# Patient Record
Sex: MALE | Race: WHITE | NOT HISPANIC OR LATINO | Employment: OTHER | URBAN - METROPOLITAN AREA
[De-identification: names, ages, dates, MRNs, and addresses within clinical notes are randomized per-mention and may not be internally consistent; named-entity substitution may affect disease eponyms.]

---

## 2021-03-18 ENCOUNTER — TELEPHONE (OUTPATIENT)
Dept: ADMINISTRATIVE | Facility: OTHER | Age: 66
End: 2021-03-18

## 2021-03-18 ENCOUNTER — OFFICE VISIT (OUTPATIENT)
Dept: INTERNAL MEDICINE CLINIC | Facility: CLINIC | Age: 66
End: 2021-03-18
Payer: MEDICARE

## 2021-03-18 VITALS
HEART RATE: 71 BPM | SYSTOLIC BLOOD PRESSURE: 126 MMHG | DIASTOLIC BLOOD PRESSURE: 78 MMHG | OXYGEN SATURATION: 99 % | WEIGHT: 238.38 LBS | TEMPERATURE: 96.9 F | HEIGHT: 70 IN | BODY MASS INDEX: 34.13 KG/M2

## 2021-03-18 DIAGNOSIS — I35.8 AORTIC VALVE SCLEROSIS: ICD-10-CM

## 2021-03-18 DIAGNOSIS — G47.30 SLEEP APNEA IN ADULT: ICD-10-CM

## 2021-03-18 DIAGNOSIS — I10 BENIGN ESSENTIAL HYPERTENSION: Primary | ICD-10-CM

## 2021-03-18 DIAGNOSIS — Z23 IMMUNIZATION DUE: ICD-10-CM

## 2021-03-18 DIAGNOSIS — E11.9 TYPE 2 DIABETES MELLITUS WITHOUT COMPLICATION, WITHOUT LONG-TERM CURRENT USE OF INSULIN (HCC): ICD-10-CM

## 2021-03-18 PROBLEM — M15.9 PRIMARY OSTEOARTHRITIS INVOLVING MULTIPLE JOINTS: Status: ACTIVE | Noted: 2019-09-24

## 2021-03-18 PROBLEM — E66.812 CLASS 2 OBESITY DUE TO EXCESS CALORIES IN ADULT: Status: ACTIVE | Noted: 2020-10-19

## 2021-03-18 PROBLEM — E66.09 CLASS 2 OBESITY DUE TO EXCESS CALORIES IN ADULT: Status: ACTIVE | Noted: 2020-10-19

## 2021-03-18 PROBLEM — E78.2 MIXED HYPERLIPIDEMIA: Status: ACTIVE | Noted: 2020-10-19

## 2021-03-18 PROBLEM — M15.0 PRIMARY OSTEOARTHRITIS INVOLVING MULTIPLE JOINTS: Status: ACTIVE | Noted: 2019-09-24

## 2021-03-18 PROCEDURE — 90670 PCV13 VACCINE IM: CPT

## 2021-03-18 PROCEDURE — 99204 OFFICE O/P NEW MOD 45 MIN: CPT | Performed by: INTERNAL MEDICINE

## 2021-03-18 PROCEDURE — G0009 ADMIN PNEUMOCOCCAL VACCINE: HCPCS

## 2021-03-18 RX ORDER — ATORVASTATIN CALCIUM 80 MG/1
80 TABLET, FILM COATED ORAL
COMMUNITY
Start: 2021-02-19 | End: 2021-08-26 | Stop reason: SDUPTHER

## 2021-03-18 RX ORDER — ALLOPURINOL 300 MG/1
300 TABLET ORAL
COMMUNITY
Start: 2021-02-19 | End: 2021-08-26 | Stop reason: SDUPTHER

## 2021-03-18 RX ORDER — RABEPRAZOLE SODIUM 20 MG/1
20 TABLET, DELAYED RELEASE ORAL
COMMUNITY
End: 2021-03-18 | Stop reason: ALTCHOICE

## 2021-03-18 RX ORDER — ASPIRIN 325 MG
81 TABLET ORAL DAILY
COMMUNITY
End: 2021-08-26 | Stop reason: SDUPTHER

## 2021-03-18 RX ORDER — PANTOPRAZOLE SODIUM 40 MG/1
40 TABLET, DELAYED RELEASE ORAL
COMMUNITY
Start: 2021-02-19 | End: 2021-08-26 | Stop reason: SDUPTHER

## 2021-03-18 RX ORDER — LOSARTAN POTASSIUM 50 MG/1
50 TABLET ORAL
COMMUNITY
Start: 2021-02-19 | End: 2021-08-26 | Stop reason: SDUPTHER

## 2021-03-18 RX ORDER — EPINEPHRINE 0.3 MG/.3ML
INJECTION SUBCUTANEOUS
COMMUNITY
Start: 2021-02-19

## 2021-03-18 RX ORDER — METFORMIN HYDROCHLORIDE 500 MG/1
TABLET, EXTENDED RELEASE ORAL
COMMUNITY
Start: 2021-02-19 | End: 2021-08-26 | Stop reason: SDUPTHER

## 2021-03-18 RX ORDER — HYDROCHLOROTHIAZIDE 25 MG/1
50 TABLET ORAL
COMMUNITY
Start: 2021-02-19 | End: 2021-07-03 | Stop reason: SDUPTHER

## 2021-03-18 NOTE — PROGRESS NOTES
Assessment/Plan:         Diagnoses and all orders for this visit:    Benign essential hypertension  Bp stable on current regimen Pt has lost over 20 pounds since Fall and is continuing his efforts He will monitor for s/s of low bp as he continues to lose weight     Sleep apnea in adult  Pt stable on bipap for many years No issues and sleeps better with bipap    Type 2 diabetes mellitus without complication, without long-term current use of insulin ([address] )  He feels doing better with diet and has lost weight since placed on Metformin in Fall He will be due for A1c in April He does not monitor at home but has lost over 20 pounds     Aortic valve sclerosis  -     Echo complete with contrast if indicated; Future  Had echo almost 2 years ago and does have murmur today recheck echo ? Cardio followup    Other orders  -     allopurinol (ZYLOPRIM) 300 mg tablet; Take 300 mg by mouth  -     atorvastatin (LIPITOR) 80 mg tablet; Take 80 mg by mouth  -     EPINEPHrine (EPIPEN) 0 3 mg/0 3 mL SOAJ; Inject 0 3mg/0 3ml as needed for allergic rxn  -     hydrochlorothiazide (HYDRODIURIL) 25 mg tablet; Take 50 mg by mouth  -     losartan (COZAAR) 50 mg tablet; Take 50 mg by mouth  -     metFORMIN (GLUCOPHAGE-XR) 500 mg 24 hr tablet; 2 tabs daily with dinner  -     pantoprazole (PROTONIX) 40 mg tablet; Take 40 mg by mouth  -     Discontinue: RABEprazole (ACIPHEX) 20 MG tablet; Take 20 mg by mouth  -     aspirin 325 mg tablet; Take 325 mg by mouth daily  -     VITAMIN D PO; Take 2,000 Int'l Units by mouth daily    Rto 4 months        Patient ID: Akil Morales is a 72 y o  male  HPI  NP 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 [facility]   He generally feels well, better in fact His A1c was elevated in the fall and placed on metformin He moralez snot monitor sugars at home but he had changed his diet and lost over 20 pounds No chest pain or sob No falls He had colonoscopy last year His bowels are sluggish at times since change in diet He is not exercising regularly but will be more active as the weather improves Hx of aortic sclerosis last echo almost 2 years ago No syncope No dizziness No sob He had both covid vaccinations Has eye exam scheduled for next week       Review of Systems   Constitutional: Negative  HENT: Negative  Respiratory: Negative  Cardiovascular: Negative  Gastrointestinal: Negative  Genitourinary: Negative  Musculoskeletal: Positive for neck pain  Skin: Negative  Neurological: Negative  Hematological: Negative  Psychiatric/Behavioral: Negative          Past Medical History:   Diagnosis Date    Diabetes mellitus ([place] [address] )     DM (diabetes mellitus) ([place] [address] )     GERD (gastroesophageal reflux disease)     Gout     Hypertension     Osteoarthritis     Sleep apnea      Past Surgical History:   Procedure Laterality Date    HERNIA REPAIR      LAMINECTOMY       Social History     Socioeconomic History    Marital status: /Civil Union     Spouse name: Not on file    Number of children: Not on file    Years of education: Not on file    Highest education level: Not on file   Occupational History    Not on file   Social Needs    Financial resource strain: Not on file    Food insecurity     Worry: Not on file     Inability: Not on file    Transportation needs     Medical: Not on file     Non-medical: Not on file   Tobacco Use    Smoking status: Former Smoker    Smokeless tobacco: Never Used   Substance and Sexual Activity    Alcohol use: Yes     Frequency: 2-3 times a week     Drinks per session: 1 or 2     Binge frequency: Less than monthly    Drug use: Never    Sexual activity: Not on file   Lifestyle    Physical activity     Days per week: Not on file     Minutes per session: Not on file    Stress: Not on file   Relationships    Social connections     Talks on phone: Not on file     Gets together: Not on file     Attends [employer] service: Not on file     Active member of club or organization: Not on file     Attends meetings of clubs or organizations: Not on file     Relationship status: Not on file    Intimate partner violence     Fear of current or ex partner: Not on file     Emotionally abused: Not on file     Physically abused: Not on file     Forced sexual activity: Not on file   Other Topics Concern    Not on file   Social History Narrative    Not on file     Allergies   Allergen Reactions    Nuts Anaphylaxis     BMI Counseling: Body mass index is 34 2 kg/m²  The BMI is above normal  Nutrition recommendations include consuming healthier snacks and moderation in carbohydrate intake  Exercise recommendations include exercising 3-5 times per week  No pharmacotherapy was ordered  /78   Pulse 71   Temp (!) 96 9 °F (36 1 °C) (Temporal)   Ht 5' 10" (1 778 m)   Wt 108 kg (238 lb 6 oz)   SpO2 99%   BMI 34 20 kg/m²          Physical Exam  Vitals signs reviewed  Constitutional:       General: He is not in acute distress  Appearance: Normal appearance  He is not ill-appearing, toxic-appearing or diaphoretic  HENT:      Head: Normocephalic and atraumatic  Neurological:      Mental Status: He is alert

## 2021-03-18 NOTE — LETTER
Procedure Request Form: Colonoscopy  2nd Request    Date Requested: 21  Patient: Calvin Tan  Patient : 1955   Referring Provider: Helena Gallop, DO        Date of Procedure ______________________________       The above patient has informed us that they have completed their   most recent Colonoscopy at your facility  Please complete   this form and attach all corresponding procedure reports/results  Comments __________________________________________________________  ____________________________________________________________________  ____________________________________________________________________  ____________________________________________________________________    Facility Completing Procedure _________________________________________    Form Completed By (print name) _______________________________________      Signature __________________________________________________________      These reports are needed for  compliance    Please fax this completed form and a copy of the procedure report to our office located at [place] [address] as soon as possible to [phone] andres Abarca: Phone [phone]    We thank you for your assistance in treating our mutual patient

## 2021-03-18 NOTE — LETTER
Procedure Request Form: Colonoscopy      Date Requested: 21  Patient: Urvashi Shelling  Patient : 1955   Referring Provider: Macario Gent, DO        Date of Procedure ______________________________       The above patient has informed us that they have completed their   most recent Colonoscopy at your facility  Please complete   this form and attach all corresponding procedure reports/results  Comments __________________________________________________________  ____________________________________________________________________  ____________________________________________________________________  ____________________________________________________________________    Facility Completing Procedure _________________________________________    Form Completed By (print name) _______________________________________      Signature __________________________________________________________      These reports are needed for  compliance    Please fax this completed form and a copy of the procedure report to our office located at [place] [address] as soon as possible to [phone] andres Choi Both: Phone [phone]    We thank you for your assistance in treating our mutual patient

## 2021-03-18 NOTE — LETTER
Procedure Request Form: Colonoscopy      Date Requested: 21  Patient: Spencer Belcher  Patient : 1955   Referring Provider: Daniele Covarrubias, DO        Date of Procedure ______________________________       The above patient has informed us that they have completed their   most recent Colonoscopy at your facility  Please complete   this form and attach all corresponding procedure reports/results  Comments __________________________________________________________  ____________________________________________________________________  ____________________________________________________________________  ____________________________________________________________________    Facility Completing Procedure _________________________________________    Form Completed By (print name) _______________________________________      Signature __________________________________________________________      These reports are needed for  compliance    Please fax this completed form and a copy of the procedure report to our office located at [place] [address] as soon as possible to [phone] [facility]: Phone [phone]    We thank you for your assistance in treating our mutual patient

## 2021-03-18 NOTE — TELEPHONE ENCOUNTER
Upon review of the In Basket request and the patient's chart, initial outreach has been made via fax, please see Contacts section for details         x1 colonoscopy  Thank you  Tony Salmeron

## 2021-03-18 NOTE — TELEPHONE ENCOUNTER
Upon review of the In Basket request we were able to locate, review, and update the patient chart as requested for Hemoglobin A1c, Hepatitis C  and Urine Microalbumin  Any additional questions or concerns should be emailed to the Practice Liaisons via [email or web address] email, please do not reply via In Basket      Thank you  Domi Villa

## 2021-03-18 NOTE — PROGRESS NOTES
Diabetic Foot Exam    Patient's shoes and socks removed  Right Foot/Ankle   Right Foot Inspection  Skin Exam: skin normal and skin intact no dry skin, no warmth, no callus, no erythema, no maceration, no abnormal color, no pre-ulcer, no ulcer and no callus                          Toe Exam: ROM and strength within normal limits  Sensory   Vibration: intact    Monofilament testing: intact  Vascular    The right DP pulse is 2+  The right PT pulse is 1+  Left Foot/Ankle  Left Foot Inspection  Skin Exam: skin normal and skin intactno dry skin, no warmth, no erythema, no maceration, normal color, no pre-ulcer, no ulcer and no callus                         Toe Exam: ROM and strength within normal limits                   Sensory   Vibration: intact    Monofilament: intact  Vascular    The left DP pulse is 2+  The left PT pulse is 1+  Assign Risk Category:  No deformity present; No loss of protective sensation;  No weak pulses       Risk: 0

## 2021-03-23 NOTE — TELEPHONE ENCOUNTER
As a follow-up, a second attempt has been made for outreach via fax, please see Contacts section for details      Thank you  Shara Harris

## 2021-03-25 LAB
LEFT EYE DIABETIC RETINOPATHY: NORMAL
RIGHT EYE DIABETIC RETINOPATHY: NORMAL

## 2021-03-26 ENCOUNTER — HOSPITAL ENCOUNTER (OUTPATIENT)
Dept: NON INVASIVE DIAGNOSTICS | Facility: HOSPITAL | Age: 66
Discharge: HOME | End: 2021-03-26
Attending: INTERNAL MEDICINE
Payer: MEDICARE

## 2021-03-26 DIAGNOSIS — I35.8 AORTIC VALVE SCLEROSIS: ICD-10-CM

## 2021-03-26 PROCEDURE — 93306 TTE W/DOPPLER COMPLETE: CPT | Performed by: INTERNAL MEDICINE

## 2021-03-26 PROCEDURE — 93306 TTE W/DOPPLER COMPLETE: CPT

## 2021-04-01 NOTE — TELEPHONE ENCOUNTER
Upon review of the In Basket request and the patient's chart, initial outreach has been made via fax, please see Contacts section for details  I called Dr Janki Quinones office to follow up on requested records- spoke with Nerissa Stagers and she stated that they do not have records will need to reach out to Dr Samina Lopez       Thank you  Enedina Price

## 2021-04-02 ENCOUNTER — TELEPHONE (OUTPATIENT)
Dept: ADMINISTRATIVE | Facility: OTHER | Age: 66
End: 2021-04-02

## 2021-04-05 ENCOUNTER — TELEPHONE (OUTPATIENT)
Dept: ADMINISTRATIVE | Facility: OTHER | Age: 66
End: 2021-04-05

## 2021-04-05 NOTE — TELEPHONE ENCOUNTER
----- Message from Eva Messina MA sent at 4/5/2021  8:54 AM EDT -----  Regarding: colonoscopy-[place]  04/05/21 8:55 AM    Hello, our patient Félix Tamayo has had CRC: Colonoscopy completed/performed  Please assist in updating the patient chart by making an External outreach to [address] facility located in [place], [place]  The date of service is 5/2020      Thank you,  Eva Messina MA  [facility]

## 2021-04-05 NOTE — TELEPHONE ENCOUNTER
Upon review of the In Basket request and the patient's chart, initial outreach has been made via fax, please see Contacts section for details       Thank you  Ramon Patel

## 2021-04-05 NOTE — LETTER
Procedure Request Form: Colonoscopy      Date Requested: 21  Patient: Gailen McRoberts  Patient : 1955   Referring Provider: Dellar Fabry, DO        Date of Procedure ______________________________       The above patient has informed us that they have completed their   most recent Colonoscopy at your facility  Please complete   this form and attach all corresponding procedure reports/results  Comments __________________________________________________________  ____________________________________________________________________  ____________________________________________________________________  ____________________________________________________________________    Facility Completing Procedure _________________________________________    Form Completed By (print name) _______________________________________      Signature __________________________________________________________      These reports are needed for  compliance    Please fax this completed form and a copy of the procedure report to our office located at [place] [address] as soon as possible to [phone] andres Cates [phone]    We thank you for your assistance in treating our mutual patient     Dr Leti Forrest [phone]

## 2021-04-05 NOTE — TELEPHONE ENCOUNTER
Upon review of the In Basket request we have found/obtained the documentation  After careful review of the document we are unable to complete this request for CRC: Colonoscopy because the documentation is considered an External Result  The documentation found is a copy of an original  We require the original document to close the gap(s)  Any additional questions or concerns should be emailed to the Practice Liaisons via [email or web address] email, please do not reply via In Basket      Thank you  Sophie Li

## 2021-04-07 NOTE — TELEPHONE ENCOUNTER
Upon review of the In Basket request we have found as a result of outreach that patient did not have the requested item(s) completed  Colonoscopy    Any additional questions or concerns should be emailed to the Practice Liaisons via [email or web address] email, please do not reply via In Basket      Thank you  Albert Quinones

## 2021-04-08 NOTE — TELEPHONE ENCOUNTER
Upon review of the request/inquiry, we are reaching out to you to ask for assistance  We have reviewed all Chart Review tabs, Care Everywhere (CE), completed a chart search and were unable to locate requested item(s)  If you feel this was an oversight, please respond with with location and date of service of the document(s)  CE document for Colonoscopy does not have the report  After outreaching to obtain the records-they stated they do not have records  To respond with requested information, open this Encounter, navigate to the Routing section, add your response to the routing comments, add my name to the Recipient field, and select Send and Close Workspace      Thank you  Sasha Lott

## 2021-04-30 ENCOUNTER — OFFICE VISIT (OUTPATIENT)
Dept: URGENT CARE | Facility: CLINIC | Age: 66
End: 2021-04-30
Payer: MEDICARE

## 2021-04-30 ENCOUNTER — APPOINTMENT (OUTPATIENT)
Dept: RADIOLOGY | Facility: CLINIC | Age: 66
End: 2021-04-30
Payer: MEDICARE

## 2021-04-30 VITALS
HEART RATE: 75 BPM | DIASTOLIC BLOOD PRESSURE: 76 MMHG | BODY MASS INDEX: 33.79 KG/M2 | HEIGHT: 70 IN | WEIGHT: 236 LBS | SYSTOLIC BLOOD PRESSURE: 140 MMHG | OXYGEN SATURATION: 99 % | RESPIRATION RATE: 18 BRPM | TEMPERATURE: 98 F

## 2021-04-30 DIAGNOSIS — M25.561 ACUTE PAIN OF RIGHT KNEE: Primary | ICD-10-CM

## 2021-04-30 DIAGNOSIS — M25.561 ACUTE PAIN OF RIGHT KNEE: ICD-10-CM

## 2021-04-30 PROCEDURE — G0463 HOSPITAL OUTPT CLINIC VISIT: HCPCS | Performed by: NURSE PRACTITIONER

## 2021-04-30 PROCEDURE — 73562 X-RAY EXAM OF KNEE 3: CPT

## 2021-04-30 PROCEDURE — 99213 OFFICE O/P EST LOW 20 MIN: CPT | Performed by: NURSE PRACTITIONER

## 2021-04-30 NOTE — PATIENT INSTRUCTIONS
Your xray was preliminarily read by your provider  A radiologist will read the xray if it is abnormal you will be notified  You may take tylenol  And/or motrin for pain  You are to wear the knee immobilizer to stabilize the knee  You are to follow up with your PCP or orthopedist on Monday  You are to down load mychart and get your xray results  If you do not hear from the office you may call the office Monday  Go to the ED if symptoms worsen    Knee Pain   WHAT YOU NEED TO KNOW:   Knee pain may start suddenly, or it may be a long-term problem  You may have pain on the side, front, or back of your knee  You may have knee stiffness and swelling  You may hear popping sounds or feel like your knee is giving way or locking up as you walk  You may feel pain when you sit, stand, walk, or climb up and down stairs  Knee pain can be caused by conditions such as obesity, inflammation, or strains or tears in ligaments or tendons  DISCHARGE INSTRUCTIONS:   Return to the emergency department if:   · Your pain is worse, even after treatment  · You cannot bend or straighten your leg completely  · The swelling around your knee does not go down even with treatment  · Your knee is painful and hot to the touch  Contact your healthcare provider if:   · You have questions or concerns about your condition or care  Medicines: You may need any of the following:  · NSAIDs  help decrease swelling and pain or fever  This medicine is available with or without a doctor's order  NSAIDs can cause stomach bleeding or kidney problems in certain people  If you take blood thinner medicine, always ask your healthcare provider if NSAIDs are safe for you  Always read the medicine label and follow directions  · Acetaminophen  decreases pain and fever  It is available without a doctor's order  Ask how much to take and how often to take it  Follow directions   Read the labels of all other medicines you are using to see if they also contain acetaminophen, or ask your doctor or pharmacist  Acetaminophen can cause liver damage if not taken correctly  Do not use more than 4 grams (4,000 milligrams) total of acetaminophen in one day  · Prescription pain medicine  may be given  Ask your healthcare provider how to take this medicine safely  Some prescription pain medicines contain acetaminophen  Do not take other medicines that contain acetaminophen without talking to your healthcare provider  Too much acetaminophen may cause liver damage  Prescription pain medicine may cause constipation  Ask your healthcare provider how to prevent or treat constipation  · Take your medicine as directed  Contact your healthcare provider if you think your medicine is not helping or if you have side effects  Tell him or her if you are allergic to any medicine  Keep a list of the medicines, vitamins, and herbs you take  Include the amounts, and when and why you take them  Bring the list or the pill bottles to follow-up visits  Carry your medicine list with you in case of an emergency  What you can do to manage your symptoms:   · Rest your knee so it can heal   Limit activities that increase your pain  Do low-impact exercises, such as walking or swimming  · Apply ice to help reduce swelling and pain  Use an ice pack, or put crushed ice in a plastic bag  Cover it with a towel before you apply it to your knee  Apply ice for 15 to 20 minutes every hour, or as directed  · Apply compression to help reduce swelling  Use a brace or bandage only as directed  · Elevate your knee to help decrease pain and swelling  Elevate your knee while you are sitting or lying down  Prop your leg on pillows to keep your knee above the level of your heart  · Prevent your knee from moving as directed  Your healthcare provider may put on a cast or splint  You may need to wear a leg brace to stabilize your knee   A leg brace can be adjusted to increase your range of motion as your knee heals  What you can do to prevent knee pain:   · Maintain a healthy weight  Extra weight increases your risk for knee pain  Ask your healthcare provider how much you should weigh  He or she can help you create a safe weight loss plan if you need to lose weight  · Exercise or train properly  Use the correct equipment for sports  Wear shoes that provide good support  Check your posture often as you exercise, play sports, or train for an event  This can help prevent stress and strain on your knees  Rest between sessions so you do not overwork your knees  Follow up with your healthcare provider within 24 hours or as directed: You may need follow-up treatments, such as steroid injections to decrease pain  Write down your questions so you remember to ask them during your visits  © Copyright [address] Information is for End User's use only and may not be sold, redistributed or otherwise used for commercial purposes  All illustrations and images included in CareNotes® are the copyrighted property of A D A M , Inc  or [address] [place], [place] [address]kanika   The above information is an  only  It is not intended as medical advice for individual conditions or treatments  Talk to your doctor, nurse or pharmacist before following any medical regimen to see if it is safe and effective for you

## 2021-04-30 NOTE — PROGRESS NOTES
[facility]        NAME: Marya Garza is a 72 y o  male  : 1955    MRN: [id number]  DATE: 2021  TIME: 11:01 AM    Assessment and Plan   Acute pain of right knee [M25 561]  1  Acute pain of right knee  XR knee 3 vw right non injury    Knee Immobilizer    Crutches    Orthopedic injury treatment    CANCELED: XR knee 3 vw right non injury         Patient Instructions       Follow up with PCP in 3-5 days  Proceed to  ER if symptoms worsen  Chief Complaint     Chief Complaint   Patient presents with    Knee Pain     right knee pain for 1 day as he stood up         History of Present Illness       This is a 72year old male who states has a hx of DJD in his knees but last night stood up from the chair and had a sudden acute onset of right lateral posterior knee pain  He states he can bear weight but can not walk, has to touch toe  He states he took some motrin for pain but it really didn't do much  He is using a cane to walk with  Denies hx of injury or surgery on the knee  Knee Pain         Review of Systems   Review of Systems   Musculoskeletal:        Posterior right knee pain      All other systems reviewed and are negative          Current Medications       Current Outpatient Medications:     allopurinol (ZYLOPRIM) 300 mg tablet, Take 300 mg by mouth, Disp: , Rfl:     aspirin 325 mg tablet, Take 81 mg by mouth daily , Disp: , Rfl:     atorvastatin (LIPITOR) 80 mg tablet, Take 80 mg by mouth, Disp: , Rfl:     EPINEPHrine (EPIPEN) 0 3 mg/0 3 mL SOAJ, Inject 0 3mg/0 3ml as needed for allergic rxn, Disp: , Rfl:     hydrochlorothiazide (HYDRODIURIL) 25 mg tablet, Take 50 mg by mouth, Disp: , Rfl:     losartan (COZAAR) 50 mg tablet, Take 50 mg by mouth, Disp: , Rfl:     metFORMIN (GLUCOPHAGE-XR) 500 mg 24 hr tablet, 2 tabs daily with dinner, Disp: , Rfl:     pantoprazole (PROTONIX) 40 mg tablet, Take 40 mg by mouth, Disp: , Rfl:     VITAMIN D PO, Take 2,000 Int'l Units by mouth daily, Disp: , Rfl:     Current Allergies     Allergies as of 04/30/2021 - Reviewed 04/30/2021   Allergen Reaction Noted    Nuts - food allergy Anaphylaxis 09/24/2019            The following portions of the patient's history were reviewed and updated as appropriate: allergies, current medications, past family history, past medical history, past social history, past surgical history and problem list      Past Medical History:   Diagnosis Date    Diabetes mellitus ([facility] )     DM [facility]diabetes mellitus) ([facility] )     GERD (gastroesophageal reflux disease)     Gout     Hypertension     Osteoarthritis     Sleep apnea        Past Surgical History:   Procedure Laterality Date    HERNIA REPAIR      LAMINECTOMY         Family History   Problem Relation Age of Onset    Hypertension Mother     Colon cancer Mother     Hypertension Father     Stroke Father     Prostate cancer Brother          Medications have been verified  Objective   /76   Pulse 75   Temp 98 °F (36 7 °C) (Temporal)   Resp 18   Ht 5' 10" (1 778 m)   Wt 107 kg (236 lb)   SpO2 99%   BMI 33 86 kg/m²   No LMP for male patient  Physical Exam     Physical Exam  Vitals signs and nursing note reviewed  Constitutional:       General: He is not in acute distress  Appearance: Normal appearance  He is obese  He is not ill-appearing, toxic-appearing or diaphoretic  HENT:      Head: Normocephalic and atraumatic  Eyes:      Extraocular Movements: Extraocular movements intact  Neck:      Musculoskeletal: Normal range of motion  Cardiovascular:      Rate and Rhythm: Normal rate  Pulmonary:      Effort: Pulmonary effort is normal    Musculoskeletal:         General: Tenderness present  No swelling  Comments: Pt touch toe walks when ambulating with cane  No edema  No laxity of knee  TTP at posterior lateral side of knee  No palpable masses  Muscle strength 5/5 B/L LE    Skin:     General: Skin is warm and dry  Capillary Refill: Capillary refill takes less than 2 seconds  Neurological:      General: No focal deficit present  Mental Status: He is alert  Psychiatric:         Mood and Affect: Mood normal          Behavior: Behavior normal          Thought Content: Thought content normal          Judgment: Judgment normal            Preliminary reading of xray  calcifications of bone and vascular   Waiting on rad read     Orthopedic injury treatment    Date/Time: 4/30/2021 10:41 AM  Performed by: CINDY Macias  Authorized by: CINDY Macias     Patient Location:  Clinic  Other Assisting Provider: Yes (comment) Nawaf Jack LPN)    Verbal consent obtained?: Yes    Risks and benefits: Risks, benefits and alternatives were discussed    Consent given by:  Patient  Patient states understanding of procedure being performed: Yes    Patient's understanding of procedure matches consent: Yes    Procedure consent matches procedure scheduled: Yes    Relevant documents present and verified: Yes    Test results available and properly labeled: Yes    Site marked: Yes    Radiology Images displayed and confirmed  If images not available, report reviewed: Yes    Required items: Required blood products, implants, devices and special equipment available    Patient identity confirmed:  Verbally with patient and hospital-assigned identification number  Time out: Immediately prior to the procedure a time out was called    Injury location:  Knee  Location details:  Right knee  Injury type:   Soft tissue  Neurovascular status: Neurovascularly intact    Distal perfusion: normal    Neurological function: normal    Range of motion: reduced    Immobilization:  Knee immobilizer and crutches  Neurovascular status: Neurovascularly intact    Distal perfusion: normal    Neurological function: normal    Range of motion: unchanged    Patient tolerance:  Patient tolerated the procedure well with no immediate complications

## 2021-05-03 ENCOUNTER — TELEPHONE (OUTPATIENT)
Dept: INTERNAL MEDICINE CLINIC | Facility: CLINIC | Age: 66
End: 2021-05-03

## 2021-05-03 DIAGNOSIS — M25.561 ACUTE PAIN OF RIGHT KNEE: Primary | ICD-10-CM

## 2021-05-03 NOTE — TELEPHONE ENCOUNTER
Patient was seen at [facility] over the weekend, asking for referral to ortho per [facility] f/up for knee pain

## 2021-05-06 ENCOUNTER — CONSULT (OUTPATIENT)
Dept: OBGYN CLINIC | Facility: CLINIC | Age: 66
End: 2021-05-06
Payer: MEDICARE

## 2021-05-06 VITALS
SYSTOLIC BLOOD PRESSURE: 166 MMHG | BODY MASS INDEX: 33.79 KG/M2 | DIASTOLIC BLOOD PRESSURE: 95 MMHG | HEART RATE: 94 BPM | HEIGHT: 70 IN | WEIGHT: 236 LBS

## 2021-05-06 DIAGNOSIS — Z12.11 ENCOUNTER FOR SCREENING COLONOSCOPY: ICD-10-CM

## 2021-05-06 DIAGNOSIS — M17.11 PRIMARY OSTEOARTHRITIS OF RIGHT KNEE: Primary | ICD-10-CM

## 2021-05-06 PROCEDURE — 20610 DRAIN/INJ JOINT/BURSA W/O US: CPT | Performed by: ORTHOPAEDIC SURGERY

## 2021-05-06 PROCEDURE — 99203 OFFICE O/P NEW LOW 30 MIN: CPT | Performed by: ORTHOPAEDIC SURGERY

## 2021-05-06 RX ORDER — LIDOCAINE HYDROCHLORIDE 10 MG/ML
5 INJECTION, SOLUTION INFILTRATION; PERINEURAL
Status: COMPLETED | OUTPATIENT
Start: 2021-05-06 | End: 2021-05-06

## 2021-05-06 RX ORDER — BETAMETHASONE SODIUM PHOSPHATE AND BETAMETHASONE ACETATE 3; 3 MG/ML; MG/ML
6 INJECTION, SUSPENSION INTRA-ARTICULAR; INTRALESIONAL; INTRAMUSCULAR; SOFT TISSUE
Status: COMPLETED | OUTPATIENT
Start: 2021-05-06 | End: 2021-05-06

## 2021-05-06 RX ADMIN — BETAMETHASONE SODIUM PHOSPHATE AND BETAMETHASONE ACETATE 6 MG: 3; 3 INJECTION, SUSPENSION INTRA-ARTICULAR; INTRALESIONAL; INTRAMUSCULAR; SOFT TISSUE at 10:31

## 2021-05-06 RX ADMIN — LIDOCAINE HYDROCHLORIDE 5 ML: 10 INJECTION, SOLUTION INFILTRATION; PERINEURAL at 10:31

## 2021-05-06 NOTE — PROGRESS NOTES
Assessment:     1  Primary osteoarthritis of right knee    2  Encounter for screening colonoscopy          Plan:     Problem List Items Addressed This Visit        Musculoskeletal and Integument    Primary osteoarthritis of right knee - Primary      Other Visit Diagnoses     Encounter for screening colonoscopy        Relevant Orders    Ambulatory referral for colon cancer education           80-year-old male with right knee osteoarthritis  I discussed with him that this sensation could be from some aggravation of the underlying arthritis versus a lateral meniscus tear  We discussed different treatment options including surgical intervention, bracing, or injections  Given the fact that he had such good relief with the cortisone injection in the past elected to proceed with another injection  Please refer to the procedure note  He may progress activities as tolerated  He was encouraged to work on keeping the knee straight as much as possible and stretching it out  He should advance activities as tolerated  He does have a knee immobilizer which she was encouraged to wear at home when needed  I will see him back if he is continuing to have issues in 4-6 weeks  Patient ID: Calvin Tan is a 72 y o  male  Chief Complaint:  Right knee pain    HPI:  80-year-old male here today for evaluation of his right knee  On April 29th he was standing up after eating dinner and he felt and heard a pop in the posterior lateral aspect of his left knee that was very painful  Since that time he has not felt like he has been able to straighten the knee all the way out  He has been walking on his toe and using a cane  Since the initial popping sensation he has had this painful popping occur several times a day and he has to stop and rest it before he is able to get up and get going again  It is significantly affecting him  He has had known bilateral knee osteoarthritis for many years    He takes Advil on a prn basis and has been able to maintain high level of functioning and has not had his activities of daily living affected  He has recently lost 30 lb and has noted improvement in the knee since that time  He does not note any swelling in the right knee  He notes a lot of tightness and pulling in the posterior lateral aspect of the knee  In the past he had a cortisone injection for the same type problem in the same knee and had significant long-term relief following that injection  Allergy:  Allergies   Allergen Reactions    Nuts - Food Allergy Anaphylaxis       Medications:  all current active meds have been reviewed    Past Medical History:  Past Medical History:   Diagnosis Date    Diabetes mellitus ([facility] )     DM (diabetes mellitus) ([facility] )     GERD (gastroesophageal reflux disease)     Gout     Hypertension     Osteoarthritis     Sleep apnea        Past Surgical History:  Past Surgical History:   Procedure Laterality Date    HERNIA REPAIR      LAMINECTOMY         Family History:  Family History   Problem Relation Age of Onset    Hypertension Mother     Colon cancer Mother     Hypertension Father     Stroke Father     Prostate cancer Brother        Social History:  Social History     Substance and Sexual Activity   Alcohol Use Yes    Frequency: 2-3 times a week    Drinks per session: 1 or 2    Binge frequency: Less than monthly     Social History     Substance and Sexual Activity   Drug Use Never     Social History     Tobacco Use   Smoking Status Former Smoker   Smokeless Tobacco Never Used           ROS:  Review of Systems   Constitutional: Negative  Negative for chills and fever  HENT: Negative  Negative for ear pain and sore throat  Eyes: Negative  Negative for pain and visual disturbance  Respiratory: Negative  Negative for cough and shortness of breath  Cardiovascular: Negative  Negative for chest pain and palpitations  Gastrointestinal: Negative    Negative for abdominal pain and vomiting  Endocrine: Negative  Genitourinary: Negative  Negative for dysuria and hematuria  Musculoskeletal: Positive for arthralgias and gait problem  Negative for back pain  Skin: Negative  Negative for color change and rash  Allergic/Immunologic: Negative  Neurological: Negative for seizures and syncope  Hematological: Negative  Psychiatric/Behavioral: Negative  All other systems reviewed and are negative  Objective:  BP Readings from Last 1 Encounters:   05/06/21 166/95      Wt Readings from Last 1 Encounters:   05/06/21 107 kg (236 lb)        BMI:   Estimated body mass index is 33 86 kg/m² as calculated from the following:    Height as of this encounter: 5' 10" (1 778 m)  Weight as of this encounter: 107 kg (236 lb)  EXAM:   Physical Exam  Constitutional:       General: He is not in acute distress  Appearance: He is well-developed  He is not diaphoretic  HENT:      Head: Normocephalic and atraumatic  Eyes:      General:         Right eye: No discharge  Left eye: No discharge  Neck:      Musculoskeletal: Normal range of motion and neck supple  Trachea: No tracheal deviation  Cardiovascular:      Rate and Rhythm: Normal rate and regular rhythm  Pulmonary:      Effort: Pulmonary effort is normal  No respiratory distress  Abdominal:      General: There is no distension  Palpations: Abdomen is soft  Tenderness: There is no abdominal tenderness  Musculoskeletal:      Right knee: He exhibits no effusion  Left knee: He exhibits no effusion  Skin:     General: Skin is warm  Findings: No erythema  Neurological:      Mental Status: He is alert and oriented to person, place, and time  Psychiatric:         Behavior: Behavior normal        Right Knee Exam     Muscle Strength   The patient has normal right knee strength  Tenderness   The patient is experiencing no tenderness       Range of Motion   Extension: 5   Flexion: normal Tests   Sammy:  Medial - negative Lateral - negative  Varus: negative Valgus: negative  Lachman:  Anterior - negative      Drawer:  Posterior - negative  Pivot shift: negative  Patellar apprehension: negative    Other   Erythema: absent  Sensation: normal  Pulse: present  Swelling: mild  Effusion: no effusion present    Comments:   Severe crepitus with knee range of motion, bony hypertrophy   pain at full extension, walks with an antalgic gait keeping the knee bent about 20° when walking      Left Knee Exam     Muscle Strength   The patient has normal left knee strength  Tenderness   The patient is experiencing no tenderness  Range of Motion   Extension: 10   Flexion: normal     Tests   Sammy:  Medial - negative Lateral - negative  Varus: negative Valgus: negative  Lachman:  Anterior - negative      Drawer:  Posterior - negative  Pivot shift: negative  Patellar apprehension: negative    Other   Erythema: absent  Sensation: normal  Pulse: present  Swelling: none  Effusion: no effusion present    Comments:    Severe crepitus with knee range of motion, bony hypertrophy              Radiographs:  I have personally reviewed pertinent films in PACS and my interpretation is Severe tricompartmental degenerative changes of the knee with complete medial and patellofemoral joint space collapse, large osteophyte formation and subchondral sclerosis  Large joint arthrocentesis: R knee  Universal Protocol:  Consent given by: patient  Time out: Immediately prior to procedure a "time out" was called to verify the correct patient, procedure, equipment, support staff and site/side marked as required    Supporting Documentation  Indications: pain   Procedure Details  Location: knee - R knee  Preparation: Patient was prepped and draped in the usual sterile fashion  Needle size: 22 G  Ultrasound guidance: no  Approach: superior  Medications administered: 6 mg betamethasone acetate-betamethasone sodium phosphate 6 (3-3) mg/mL; 5 mL lidocaine 1 %    Patient tolerance: patient tolerated the procedure well with no immediate complications  Dressing:  Sterile dressing applied

## 2021-06-03 ENCOUNTER — TELEPHONE (OUTPATIENT)
Dept: ADMINISTRATIVE | Facility: OTHER | Age: 66
End: 2021-06-03

## 2021-06-03 NOTE — LETTER
Procedure Request Form: Colonoscopy      Date Requested: 21  Patient: Georgette Aguilar  Patient : 1955   Referring Provider: Rona Helen, DO        Date of Procedure ______________________________       The above patient has informed us that they have completed their   most recent Colonoscopy at your facility  Please complete   this form and attach all corresponding procedure reports/results  Comments __Dos- 20 ________________________________________________________  ____________________________________________________________________  ____________________________________________________________________  ____________________________________________________________________    Facility Completing Procedure _________________________________________    Form Completed By (print name) _______________________________________      Signature __________________________________________________________      These reports are needed for  compliance    Please fax this completed form and a copy of the procedure report to our office located at [place] [address] as soon as possible to [phone] andres Loza: Phone [phone]    We thank you for your assistance in treating our mutual patient

## 2021-06-03 NOTE — TELEPHONE ENCOUNTER
Upon review of the In Basket request and the patient's chart, initial outreach has been made via fax, please see Contacts section for details        Att x1 [place]    Thank you  Kalyan Kelly

## 2021-06-03 NOTE — TELEPHONE ENCOUNTER
----- Message from Sally Sanchez MA sent at 6/2/2021  2:38 PM EDT -----  Regarding: colonoscopy-[place]  06/02/21 2:38 PM    Hello, our patient Be Verdin has had CRC: Colonoscopy completed/performed  Please assist in updating the patient chart by making an External outreach to Dr Silas Sargent facility located in [place]  The date of service is 05/05/2020      Thank you,  Sally Sanchez MA  [facility]

## 2021-06-10 NOTE — TELEPHONE ENCOUNTER
Upon review of the In Basket request we were able to locate, review, and update the patient chart as requested for CRC: Colonoscopy  Any additional questions or concerns should be emailed to the Practice Liaisons via [email or web address] email, please do not reply via In Basket      Thank you  Eda Barry

## 2021-07-03 DIAGNOSIS — I10 ESSENTIAL HYPERTENSION: Primary | ICD-10-CM

## 2021-07-03 RX ORDER — HYDROCHLOROTHIAZIDE 25 MG/1
50 TABLET ORAL DAILY
Qty: 180 TABLET | Refills: 3 | Status: SHIPPED | OUTPATIENT
Start: 2021-07-03 | End: 2022-06-27

## 2021-07-06 ENCOUNTER — APPOINTMENT (OUTPATIENT)
Dept: LAB | Facility: MEDICAL CENTER | Age: 66
End: 2021-07-06
Payer: MEDICARE

## 2021-07-06 DIAGNOSIS — I10 BENIGN ESSENTIAL HYPERTENSION: ICD-10-CM

## 2021-07-06 DIAGNOSIS — E11.9 TYPE 2 DIABETES MELLITUS WITHOUT COMPLICATION, WITHOUT LONG-TERM CURRENT USE OF INSULIN (HCC): ICD-10-CM

## 2021-07-06 LAB
ALBUMIN SERPL BCP-MCNC: 3.9 G/DL (ref 3.5–5)
ALP SERPL-CCNC: 69 U/L (ref 46–116)
ALT SERPL W P-5'-P-CCNC: 35 U/L (ref 12–78)
ANION GAP SERPL CALCULATED.3IONS-SCNC: 4 MMOL/L (ref 4–13)
AST SERPL W P-5'-P-CCNC: 27 U/L (ref 5–45)
BILIRUB SERPL-MCNC: 0.73 MG/DL (ref 0.2–1)
BUN SERPL-MCNC: 26 MG/DL (ref 5–25)
CALCIUM SERPL-MCNC: 10.2 MG/DL (ref 8.3–10.1)
CHLORIDE SERPL-SCNC: 105 MMOL/L (ref 100–108)
CO2 SERPL-SCNC: 30 MMOL/L (ref 21–32)
CREAT SERPL-MCNC: 1.07 MG/DL (ref 0.6–1.3)
EST. AVERAGE GLUCOSE BLD GHB EST-MCNC: 123 MG/DL
GFR SERPL CREATININE-BSD FRML MDRD: 72 ML/MIN/1.73SQ M
GLUCOSE P FAST SERPL-MCNC: 100 MG/DL (ref 65–99)
HBA1C MFR BLD: 5.9 %
LDLC SERPL DIRECT ASSAY-MCNC: 88 MG/DL (ref 0–100)
POTASSIUM SERPL-SCNC: 3.9 MMOL/L (ref 3.5–5.3)
PROT SERPL-MCNC: 7.2 G/DL (ref 6.4–8.2)
SODIUM SERPL-SCNC: 139 MMOL/L (ref 136–145)

## 2021-07-06 PROCEDURE — 83036 HEMOGLOBIN GLYCOSYLATED A1C: CPT

## 2021-07-06 PROCEDURE — 83721 ASSAY OF BLOOD LIPOPROTEIN: CPT

## 2021-07-06 PROCEDURE — 36415 COLL VENOUS BLD VENIPUNCTURE: CPT

## 2021-07-06 PROCEDURE — 80053 COMPREHEN METABOLIC PANEL: CPT

## 2021-08-26 ENCOUNTER — OFFICE VISIT (OUTPATIENT)
Dept: FAMILY MEDICINE CLINIC | Facility: CLINIC | Age: 66
End: 2021-08-26
Payer: MEDICARE

## 2021-08-26 VITALS
BODY MASS INDEX: 30.82 KG/M2 | HEART RATE: 64 BPM | WEIGHT: 215.31 LBS | TEMPERATURE: 97.6 F | OXYGEN SATURATION: 98 % | HEIGHT: 70 IN

## 2021-08-26 DIAGNOSIS — I10 BENIGN ESSENTIAL HYPERTENSION: ICD-10-CM

## 2021-08-26 DIAGNOSIS — I35.8 AORTIC VALVE SCLEROSIS: ICD-10-CM

## 2021-08-26 DIAGNOSIS — E78.2 MIXED HYPERLIPIDEMIA: ICD-10-CM

## 2021-08-26 DIAGNOSIS — Z00.00 ENCOUNTER FOR SUBSEQUENT ANNUAL WELLNESS VISIT (AWV) IN MEDICARE PATIENT: ICD-10-CM

## 2021-08-26 DIAGNOSIS — Z12.5 SCREENING FOR PROSTATE CANCER: ICD-10-CM

## 2021-08-26 DIAGNOSIS — E66.09 CLASS 2 OBESITY DUE TO EXCESS CALORIES IN ADULT, UNSPECIFIED BMI, UNSPECIFIED WHETHER SERIOUS COMORBIDITY PRESENT: ICD-10-CM

## 2021-08-26 DIAGNOSIS — E11.9 TYPE 2 DIABETES MELLITUS WITHOUT COMPLICATION, WITHOUT LONG-TERM CURRENT USE OF INSULIN (HCC): Primary | ICD-10-CM

## 2021-08-26 PROBLEM — M1A.0790 CHRONIC IDIOPATHIC GOUT OF FOOT: Status: ACTIVE | Noted: 2019-09-24

## 2021-08-26 PROBLEM — R73.9 HYPERGLYCEMIA: Status: ACTIVE | Noted: 2020-10-19

## 2021-08-26 PROCEDURE — 1123F ACP DISCUSS/DSCN MKR DOCD: CPT | Performed by: INTERNAL MEDICINE

## 2021-08-26 PROCEDURE — G0402 INITIAL PREVENTIVE EXAM: HCPCS | Performed by: INTERNAL MEDICINE

## 2021-08-26 RX ORDER — METFORMIN HYDROCHLORIDE 500 MG/1
500 TABLET, EXTENDED RELEASE ORAL 2 TIMES DAILY WITH MEALS
Qty: 180 TABLET | Refills: 3 | Status: SHIPPED | OUTPATIENT
Start: 2021-08-26 | End: 2022-06-27

## 2021-08-26 RX ORDER — ASPIRIN 81 MG/1
81 TABLET ORAL DAILY
COMMUNITY

## 2021-08-26 RX ORDER — LOSARTAN POTASSIUM 50 MG/1
50 TABLET ORAL DAILY
Qty: 90 TABLET | Refills: 3 | Status: SHIPPED | OUTPATIENT
Start: 2021-08-26 | End: 2022-06-27

## 2021-08-26 RX ORDER — ATORVASTATIN CALCIUM 80 MG/1
80 TABLET, FILM COATED ORAL DAILY
Qty: 90 TABLET | Refills: 3 | Status: SHIPPED | OUTPATIENT
Start: 2021-08-26 | End: 2022-06-27

## 2021-08-26 RX ORDER — ALLOPURINOL 300 MG/1
300 TABLET ORAL DAILY
Qty: 90 TABLET | Refills: 3 | Status: SHIPPED | OUTPATIENT
Start: 2021-08-26 | End: 2022-06-27

## 2021-08-26 RX ORDER — PANTOPRAZOLE SODIUM 40 MG/1
40 TABLET, DELAYED RELEASE ORAL DAILY
Qty: 90 TABLET | Refills: 3 | Status: SHIPPED | OUTPATIENT
Start: 2021-08-26 | End: 2022-06-27

## 2021-08-26 NOTE — PROGRESS NOTES
Assessment and Plan:     Problem List Items Addressed This Visit        Endocrine    Type 2 diabetes mellitus without complication, without long-term current use of insulin (HCC) - Primary    Relevant Medications    losartan (COZAAR) 50 mg tablet    metFORMIN (GLUCOPHAGE-XR) 500 mg 24 hr tablet    allopurinol (ZYLOPRIM) 300 mg tablet    atorvastatin (LIPITOR) 80 mg tablet    pantoprazole (PROTONIX) 40 mg tablet    Other Relevant Orders    Comprehensive metabolic panel    HEMOGLOBIN A1C W/ EAG ESTIMATION    Lipid panel       Cardiovascular and Mediastinum    Benign essential hypertension    Relevant Medications    losartan (COZAAR) 50 mg tablet    metFORMIN (GLUCOPHAGE-XR) 500 mg 24 hr tablet    allopurinol (ZYLOPRIM) 300 mg tablet    atorvastatin (LIPITOR) 80 mg tablet    pantoprazole (PROTONIX) 40 mg tablet    Other Relevant Orders    Lipid panel    Aortic valve sclerosis    Relevant Medications    losartan (COZAAR) 50 mg tablet    metFORMIN (GLUCOPHAGE-XR) 500 mg 24 hr tablet    allopurinol (ZYLOPRIM) 300 mg tablet    atorvastatin (LIPITOR) 80 mg tablet    pantoprazole (PROTONIX) 40 mg tablet       Other    Class 2 obesity due to excess calories in adult    Relevant Medications    losartan (COZAAR) 50 mg tablet    metFORMIN (GLUCOPHAGE-XR) 500 mg 24 hr tablet    allopurinol (ZYLOPRIM) 300 mg tablet    atorvastatin (LIPITOR) 80 mg tablet    pantoprazole (PROTONIX) 40 mg tablet    Mixed hyperlipidemia    Relevant Medications    losartan (COZAAR) 50 mg tablet    metFORMIN (GLUCOPHAGE-XR) 500 mg 24 hr tablet    allopurinol (ZYLOPRIM) 300 mg tablet    atorvastatin (LIPITOR) 80 mg tablet    pantoprazole (PROTONIX) 40 mg tablet      Other Visit Diagnoses     Encounter for subsequent annual wellness visit (AWV) in Medicare patient          Pt aware of A1c and will start walking more ofte to further improve  He is aware of covid booster recommendation and plans to access when due  Flu shot WA and will space between covid booster  Increase water intake Lo carb diet  Eye exam utd/colon utd  Rto 6 months/prn     Preventive health issues were discussed with patient, and age appropriate screening tests were ordered as noted in patient's After Visit Summary  Personalized health advice and appropriate referrals for health education or preventive services given if needed, as noted in patient's After Visit Summary       History of Present Illness:     Patient presents for Medicare Annual Wellness visit    Patient Care Team:  DO merced Hurtado PCP - General (Internal Medicine)     Problem List:     Patient Active Problem List   Diagnosis    Benign essential hypertension    Class 2 obesity due to excess calories in adult    Mixed hyperlipidemia    Primary osteoarthritis involving multiple joints    Sleep apnea in adult    Type 2 diabetes mellitus without complication, without long-term current use of insulin (HCC)    Aortic valve sclerosis    Primary osteoarthritis of right knee    Chronic idiopathic gout of foot    Hyperglycemia      Past Medical and Surgical History:     Past Medical History:   Diagnosis Date    Diabetes mellitus ([facility] [address] )     DM (diabetes mellitus) ([facility] [address] )     GERD (gastroesophageal reflux disease)     Gout     Hypertension     Osteoarthritis     Sleep apnea      Past Surgical History:   Procedure Laterality Date    HERNIA REPAIR      LAMINECTOMY        Family History:     Family History   Problem Relation Age of Onset    Hypertension Mother     Colon cancer Mother     Hypertension Father     Stroke Father     Prostate cancer Brother       Social History:     Social History     Socioeconomic History    Marital status: /Civil Union     Spouse name: None    Number of children: None    Years of education: None    Highest education level: None   Occupational History    None   Tobacco Use    Smoking status: Former Smoker    Smokeless tobacco: Never Used   Vaping Use    Vaping Use: Never used   Substance and Sexual Activity    Alcohol use: Yes    Drug use: Never    Sexual activity: None   Other Topics Concern    None   Social History Narrative    None     Social Determinants of Health     Financial Resource Strain:     Difficulty of Paying Living Expenses:    Food Insecurity:     Worried About Running Out of Food in the Last Year:     [address] in the Last Year:    Transportation Needs:     Lack of Transportation (Medical):      Lack of Transportation (Non-Medical):    Physical Activity:     Days of Exercise per Week:     Minutes of Exercise per Session:    Stress:     Feeling of Stress :    Social Connections:     Frequency of Communication with Friends and Family:     Frequency of Social Gatherings with Friends and Family:     Attends [employer] Services:     Active Member of Clubs or Organizations:     Attends Club or Organization Meetings:     Marital Status:    Intimate Partner Violence:     Fear of Current or Ex-Partner:     Emotionally Abused:     Physically Abused:     Sexually Abused:       Medications and Allergies:     Current Outpatient Medications   Medication Sig Dispense Refill    allopurinol (ZYLOPRIM) 300 mg tablet Take 1 tablet (300 mg total) by mouth daily 90 tablet 3    aspirin (ECOTRIN LOW STRENGTH) 81 mg EC tablet Take 81 mg by mouth daily      atorvastatin (LIPITOR) 80 mg tablet Take 1 tablet (80 mg total) by mouth daily 90 tablet 3    EPINEPHrine (EPIPEN) 0 3 mg/0 3 mL SOAJ Inject 0 3mg/0 3ml as needed for allergic rxn      hydrochlorothiazide (HYDRODIURIL) 25 mg tablet Take 2 tablets (50 mg total) by mouth daily 180 tablet 3    losartan (COZAAR) 50 mg tablet Take 1 tablet (50 mg total) by mouth daily 90 tablet 3    metFORMIN (GLUCOPHAGE-XR) 500 mg 24 hr tablet Take 1 tablet (500 mg total) by mouth 2 (two) times a day with meals 180 tablet 3    pantoprazole (PROTONIX) 40 mg tablet Take 1 tablet (40 mg total) by mouth daily 90 tablet 3    VITAMIN D PO Take 2,000 Int'l Units by mouth daily       No current facility-administered medications for this visit  Allergies   Allergen Reactions    Nuts - Food Allergy Anaphylaxis      Immunizations:     Immunization History   Administered Date(s) Administered    Influenza Injectable, MDCK, Preservative Free, Quadrivalent, 0 5 mL 09/24/2019, 10/19/2020    Pneumococcal Conjugate 13-Valent 03/18/2021    SARS-CoV-2 / COVID-19 mRNA IM (Jazjohanna Ronny) 01/23/2021, 02/20/2021    Tdap 10/19/2020      Health Maintenance:         Topic Date Due    HIV Screening  Never done    Colorectal Cancer Screening  05/05/2025    Hepatitis C Screening  Completed         Topic Date Due    Pneumococcal Vaccine: 65+ Years (1 of 2 - PPSV23) 05/13/2021    Influenza Vaccine (1) 09/01/2021      Medicare Health Risk Assessment:     Pulse 64   Temp 97 6 °F (36 4 °C) (Temporal)   Ht 5' 10" (1 778 m)   Wt 97 7 kg (215 lb 5 oz)   SpO2 98%   BMI 30 89 kg/m²      Kwan Alegria is here for his Subsequent Wellness visit  Last Medicare Wellness visit information reviewed, patient interviewed, no change since last AWV  Health Risk Assessment:   Patient rates overall health as good  Patient feels that their physical health rating is much better  Patient is very satisfied with their life  Eyesight was rated as same  Hearing was rated as same  Patient feels that their emotional and mental health rating is same  Patients states they are never, rarely angry  Patient states they are never, rarely unusually tired/fatigued  Pain experienced in the last 7 days has been some  Patient's pain rating has been 2/10  Patient states that he has experienced no weight loss or gain in last 6 months  Depression Screening:   PHQ-2 Score: 0      Fall Risk Screening: In the past year, patient has experienced: no history of falling in past year      Home Safety:  Patient does not have trouble with stairs inside or outside of their home   Patient has working smoke alarms and has no working carbon monoxide detector  Home safety hazards include: none  Nutrition:   Current diet is Regular  Medications:   Patient is not currently taking any over-the-counter supplements  Patient is able to manage medications  Activities of Daily Living (ADLs)/Instrumental Activities of Daily Living (IADLs):   Walk and transfer into and out of bed and chair?: Yes  Dress and groom yourself?: Yes    Bathe or shower yourself?: Yes    Feed yourself? Yes  Do your laundry/housekeeping?: Yes  Manage your money, pay your bills and track your expenses?: Yes  Make your own meals?: Yes    Do your own shopping?: Yes    Previous Hospitalizations:   Any hospitalizations or ED visits within the last 12 months?: No      Advance Care Planning:   Living will: Yes    Durable POA for healthcare: Yes    Advanced directive: No    End of Life Decisions reviewed with patient: Yes    Provider agrees with end of life decisions: Yes      Cognitive Screening:   Provider or family/friend/caregiver concerned regarding cognition?: No    PREVENTIVE SCREENINGS      Cardiovascular Screening:    General: History Lipid Disorder and Screening Current      Diabetes Screening:     General: History Diabetes and Screening Current      Colorectal Cancer Screening:     General: Screening Current      Prostate Cancer Screening:    General: Screening Current      Abdominal Aortic Aneurysm (AAA) Screening:    Risk factors include: age between 73-69 yo and tobacco use        Lung Cancer Screening:     General: Screening Not Indicated      Hepatitis C Screening:    General: Screening Current    Screening, Brief Intervention, and Referral to Treatment (SBIRT)    Screening  Typical number of drinks in a day: 1  Typical number of drinks in a week: 5  Interpretation: Low risk drinking behavior      AUDIT-C Screenin) How often did you have a drink containing alcohol in the past year? 2 to 3 times a week  2) How many drinks did you have on a typical day when you were drinking in the past year?  1 to 2  3) How often did you have 6 or more drinks on one occasion in the past year? never    AUDIT-C Score: 3  Interpretation: Score 0-3 (male): Negative screen for alcohol misuse    Single Item Drug Screening:  How often have you used an illegal drug (including marijuana) or a prescription medication for non-medical reasons in the past year? never    Single Item Drug Screen Score: 0  Interpretation: Negative screen for possible drug use disorder      Veronique Ko, DO

## 2021-08-26 NOTE — PROGRESS NOTES
Assessment and Plan:     Problem List Items Addressed This Visit        Endocrine    Type 2 diabetes mellitus without complication, without long-term current use of insulin ([address] ) - Primary       Cardiovascular and Mediastinum    Benign essential hypertension    Aortic valve sclerosis       Other    Class 2 obesity due to excess calories in adult    Mixed hyperlipidemia           Preventive health issues were discussed with patient, and age appropriate screening tests were ordered as noted in patient's After Visit Summary  Personalized health advice and appropriate referrals for health education or preventive services given if needed, as noted in patient's After Visit Summary       History of Present Illness:     Patient presents for Medicare Annual Wellness visit    Patient Care Team:  Natalio Kirk DO as PCP - General (Internal Medicine)     Problem List:     Patient Active Problem List   Diagnosis    Benign essential hypertension    Class 2 obesity due to excess calories in adult    Mixed hyperlipidemia    Primary osteoarthritis involving multiple joints    Sleep apnea in adult    Type 2 diabetes mellitus without complication, without long-term current use of insulin ([address] )    Aortic valve sclerosis    Primary osteoarthritis of right knee      Past Medical and Surgical History:     Past Medical History:   Diagnosis Date    Diabetes mellitus ([address] )     DM (diabetes mellitus) ([address] )     GERD (gastroesophageal reflux disease)     Gout     Hypertension     Osteoarthritis     Sleep apnea      Past Surgical History:   Procedure Laterality Date    HERNIA REPAIR      LAMINECTOMY        Family History:     Family History   Problem Relation Age of Onset    Hypertension Mother     Colon cancer Mother     Hypertension Father     Stroke Father     Prostate cancer Brother       Social History:     Social History     Socioeconomic History    Marital status: /Civil Union     Spouse name: None    Number of children: None    Years of education: None    Highest education level: None   Occupational History    None   Tobacco Use    Smoking status: Former Smoker    Smokeless tobacco: Never Used   Vaping Use    Vaping Use: Never used   Substance and Sexual Activity    Alcohol use: Yes    Drug use: Never    Sexual activity: None   Other Topics Concern    None   Social History Narrative    None     Social Determinants of Health     Financial Resource Strain:     Difficulty of Paying Living Expenses:    Food Insecurity:     Worried About Running Out of Food in the Last Year:     [address] in the Last Year:    Transportation Needs:     Lack of Transportation (Medical):      Lack of Transportation (Non-Medical):    Physical Activity:     Days of Exercise per Week:     Minutes of Exercise per Session:    Stress:     Feeling of Stress :    Social Connections:     Frequency of Communication with Friends and Family:     Frequency of Social Gatherings with Friends and Family:     Attends L[employer] Services:     Active Member of Clubs or Organizations:     Attends Club or Organization Meetings:     Marital Status:    Intimate Partner Violence:     Fear of Current or Ex-Partner:     Emotionally Abused:     Physically Abused:     Sexually Abused:       Medications and Allergies:     Current Outpatient Medications   Medication Sig Dispense Refill    allopurinol (ZYLOPRIM) 300 mg tablet Take 300 mg by mouth      aspirin (ECOTRIN LOW STRENGTH) 81 mg EC tablet Take 81 mg by mouth daily      atorvastatin (LIPITOR) 80 mg tablet Take 80 mg by mouth      EPINEPHrine (EPIPEN) 0 3 mg/0 3 mL SOAJ Inject 0 3mg/0 3ml as needed for allergic rxn      hydrochlorothiazide (HYDRODIURIL) 25 mg tablet Take 2 tablets (50 mg total) by mouth daily 180 tablet 3    losartan (COZAAR) 50 mg tablet Take 50 mg by mouth      metFORMIN (GLUCOPHAGE-XR) 500 mg 24 hr tablet 2 tabs daily with dinner      pantoprazole (PROTONIX) 40 mg tablet Take 40 mg by mouth      VITAMIN D PO Take 2,000 Int'l Units by mouth daily      aspirin 325 mg tablet Take 81 mg by mouth daily  (Patient not taking: Reported on 8/26/2021)       No current facility-administered medications for this visit  Allergies   Allergen Reactions    Nuts - Food Allergy Anaphylaxis      Immunizations:     Immunization History   Administered Date(s) Administered    Influenza Injectable, MDCK, Preservative Free, Quadrivalent, 0 5 mL 09/24/2019, 10/19/2020    Pneumococcal Conjugate 13-Valent 03/18/2021    SARS-CoV-2 / COVID-19 mRNA IM (Segundo Ibarra) 01/23/2021, 02/20/2021    Tdap 10/19/2020      Health Maintenance:         Topic Date Due    HIV Screening  Never done    Colorectal Cancer Screening  05/05/2025    Hepatitis C Screening  Completed         Topic Date Due    Pneumococcal Vaccine: 65+ Years (1 of 2 - PPSV23) 05/13/2021    Influenza Vaccine (1) 09/01/2021      Medicare Health Risk Assessment:     Pulse 64   Temp 97 6 °F (36 4 °C) (Temporal)   Ht 5' 10" (1 778 m)   Wt 97 7 kg (215 lb 5 oz)   SpO2 98%   BMI 30 89 kg/m²      Curtis Garnett is here for his Subsequent Wellness visit  Last Medicare Wellness visit information reviewed, patient interviewed and updates made to the record today  Health Risk Assessment:   Patient rates overall health as good  Patient feels that their physical health rating is much better  Patient is very satisfied with their life  Eyesight was rated as same  Hearing was rated as same  Patient feels that their emotional and mental health rating is same  Patients states they are never, rarely angry  Patient states they are never, rarely unusually tired/fatigued  Pain experienced in the last 7 days has been some  Patient's pain rating has been 2/10  Patient states that he has experienced no weight loss or gain in last 6 months  Fall Risk Screening:    In the past year, patient has experienced: no history of falling in past year      Home Safety:  Patient does not have trouble with stairs inside or outside of their home  Patient has working smoke alarms and has no working carbon monoxide detector  Home safety hazards include: none  Nutrition:   Current diet is Regular  Medications:   Patient is not currently taking any over-the-counter supplements  Patient is able to manage medications  Activities of Daily Living (ADLs)/Instrumental Activities of Daily Living (IADLs):   Walk and transfer into and out of bed and chair?: Yes  Dress and groom yourself?: Yes    Bathe or shower yourself?: Yes    Feed yourself? Yes  Do your laundry/housekeeping?: Yes  Manage your money, pay your bills and track your expenses?: Yes  Make your own meals?: Yes    Do your own shopping?: Yes    Previous Hospitalizations:   Any hospitalizations or ED visits within the last 12 months?: No      Advance Care Planning:   Living will: Yes    Durable POA for healthcare: Yes    Advanced directive: No      PREVENTIVE SCREENINGS      Cardiovascular Screening:    General: Screening Not Indicated and History Lipid Disorder      Diabetes Screening:     General: Screening Not Indicated and History Diabetes      Colorectal Cancer Screening:     General: Screening Current      Abdominal Aortic Aneurysm (AAA) Screening:    Risk factors include: age between 73-67 yo and tobacco use        Lung Cancer Screening:     General: Screening Not Indicated      Hepatitis C Screening:    General: Screening Current    Screening, Brief Intervention, and Referral to Treatment (SBIRT)    Screening  Typical number of drinks in a day: 1  Typical number of drinks in a week: 5  Interpretation: Low risk drinking behavior  AUDIT-C Screenin) How often did you have a drink containing alcohol in the past year? 2 to 3 times a week  2) How many drinks did you have on a typical day when you were drinking in the past year?  1 to 2  3) How often did you have 6 or more drinks on one occasion in the past year? never    AUDIT-C Score: 3  Interpretation: Score 0-3 (male): Negative screen for alcohol misuse    Single Item Drug Screening:  How often have you used an illegal drug (including marijuana) or a prescription medication for non-medical reasons in the past year? never    Single Item Drug Screen Score: 0  Interpretation: Negative screen for possible drug use disorder      Sae Casper, DO

## 2022-02-15 ENCOUNTER — TELEPHONE (OUTPATIENT)
Dept: ADMINISTRATIVE | Facility: OTHER | Age: 67
End: 2022-02-15

## 2022-02-15 NOTE — TELEPHONE ENCOUNTER
----- Message from Андрей Renteria sent at 2/15/2022  7:38 AM EST -----  Regarding: Indigo Grantjanice is on Losartan so should be excluded from [facility] - he is on the overdue list      On his med list says Rx is  but from the fill list he had filled recently       Can you correct so he is taken off the caregap list     Thank you    Андрей Renteria

## 2022-02-16 NOTE — TELEPHONE ENCOUNTER
Upon review of the In Basket request t we have noted that this request is regarding [facility] exclusions for [facility] because of this we are requesting that you forward this request/concern to the [facility] email  The Quality team members assigned to this email will be more than happy to assist you  Any additional questions or concerns should be emailed to the Practice Liaisons via [employer] email, please do not reply via In Basket      Thank you  Breanna Morgan

## 2022-03-04 ENCOUNTER — RA CDI HCC (OUTPATIENT)
Dept: OTHER | Facility: HOSPITAL | Age: 67
End: 2022-03-04

## 2022-03-04 ENCOUNTER — APPOINTMENT (OUTPATIENT)
Dept: LAB | Facility: MEDICAL CENTER | Age: 67
End: 2022-03-04
Payer: MEDICARE

## 2022-03-04 DIAGNOSIS — Z12.5 SCREENING FOR PROSTATE CANCER: ICD-10-CM

## 2022-03-04 DIAGNOSIS — I10 BENIGN ESSENTIAL HYPERTENSION: ICD-10-CM

## 2022-03-04 DIAGNOSIS — E11.9 TYPE 2 DIABETES MELLITUS WITHOUT COMPLICATION, WITHOUT LONG-TERM CURRENT USE OF INSULIN (HCC): ICD-10-CM

## 2022-03-04 LAB
ALBUMIN SERPL BCP-MCNC: 4.1 G/DL (ref 3.5–5)
ALP SERPL-CCNC: 62 U/L (ref 46–116)
ALT SERPL W P-5'-P-CCNC: 33 U/L (ref 12–78)
ANION GAP SERPL CALCULATED.3IONS-SCNC: 3 MMOL/L (ref 4–13)
AST SERPL W P-5'-P-CCNC: 21 U/L (ref 5–45)
BILIRUB SERPL-MCNC: 0.73 MG/DL (ref 0.2–1)
BUN SERPL-MCNC: 25 MG/DL (ref 5–25)
CALCIUM SERPL-MCNC: 10.3 MG/DL (ref 8.3–10.1)
CHLORIDE SERPL-SCNC: 105 MMOL/L (ref 100–108)
CHOLEST SERPL-MCNC: 172 MG/DL
CO2 SERPL-SCNC: 29 MMOL/L (ref 21–32)
CREAT SERPL-MCNC: 1.37 MG/DL (ref 0.6–1.3)
EST. AVERAGE GLUCOSE BLD GHB EST-MCNC: 120 MG/DL
GFR SERPL CREATININE-BSD FRML MDRD: 53 ML/MIN/1.73SQ M
GLUCOSE P FAST SERPL-MCNC: 108 MG/DL (ref 65–99)
HBA1C MFR BLD: 5.8 %
HDLC SERPL-MCNC: 47 MG/DL
LDLC SERPL CALC-MCNC: 107 MG/DL (ref 0–100)
NONHDLC SERPL-MCNC: 125 MG/DL
POTASSIUM SERPL-SCNC: 4 MMOL/L (ref 3.5–5.3)
PROT SERPL-MCNC: 7.6 G/DL (ref 6.4–8.2)
PSA SERPL-MCNC: 1.1 NG/ML (ref 0–4)
SODIUM SERPL-SCNC: 137 MMOL/L (ref 136–145)
TRIGL SERPL-MCNC: 88 MG/DL

## 2022-03-04 PROCEDURE — 80053 COMPREHEN METABOLIC PANEL: CPT

## 2022-03-04 PROCEDURE — 36415 COLL VENOUS BLD VENIPUNCTURE: CPT

## 2022-03-04 PROCEDURE — G0103 PSA SCREENING: HCPCS

## 2022-03-04 PROCEDURE — 83036 HEMOGLOBIN GLYCOSYLATED A1C: CPT

## 2022-03-04 PROCEDURE — 80061 LIPID PANEL: CPT

## 2022-03-04 NOTE — PROGRESS NOTES
[facility]  coding opportunities       Chart reviewed, no opportunity found: CHART REVIEWED, NO OPPORTUNITY FOUND                        Patients insurance company: [employer]

## 2022-03-11 ENCOUNTER — OFFICE VISIT (OUTPATIENT)
Dept: FAMILY MEDICINE CLINIC | Facility: CLINIC | Age: 67
End: 2022-03-11
Payer: MEDICARE

## 2022-03-11 VITALS
TEMPERATURE: 97.5 F | RESPIRATION RATE: 18 BRPM | HEART RATE: 78 BPM | WEIGHT: 218.4 LBS | DIASTOLIC BLOOD PRESSURE: 78 MMHG | BODY MASS INDEX: 31.34 KG/M2 | SYSTOLIC BLOOD PRESSURE: 126 MMHG

## 2022-03-11 DIAGNOSIS — E78.2 MIXED HYPERLIPIDEMIA: ICD-10-CM

## 2022-03-11 DIAGNOSIS — E11.9 TYPE 2 DIABETES MELLITUS WITHOUT COMPLICATION, WITHOUT LONG-TERM CURRENT USE OF INSULIN (HCC): Primary | ICD-10-CM

## 2022-03-11 DIAGNOSIS — I10 BENIGN ESSENTIAL HYPERTENSION: ICD-10-CM

## 2022-03-11 PROBLEM — R73.9 HYPERGLYCEMIA: Status: RESOLVED | Noted: 2020-10-19 | Resolved: 2022-03-11

## 2022-03-11 PROCEDURE — 99214 OFFICE O/P EST MOD 30 MIN: CPT | Performed by: INTERNAL MEDICINE

## 2022-03-11 NOTE — PROGRESS NOTES
Diabetic Foot Exam    Patient's shoes and socks removed  Right Foot/Ankle   Right Foot Inspection  Skin Exam: skin normal and skin intact  No dry skin, no warmth, no callus, no erythema, no maceration, no abnormal color, no pre-ulcer, no ulcer and no callus  Toe Exam: ROM and strength within normal limits  Sensory   Vibration: intact  Monofilament testing: intact    Vascular  The right DP pulse is 2+  The right PT pulse is 2+  Left Foot/Ankle  Left Foot Inspection  Skin Exam: skin normal and skin intact  No dry skin, no warmth, no erythema, no maceration, normal color, no pre-ulcer, no ulcer and no callus  Toe Exam: ROM and strength within normal limits  Sensory   Vibration: intact  Monofilament testing: intact    Vascular  The left DP pulse is 2+  The left PT pulse is 2+  Assign Risk Category  No deformity present  No loss of protective sensation  No weak pulses  Risk: 0  Assessment/Plan:         Diagnoses and all orders for this visit:    Type 2 diabetes mellitus without complication, without long-term current use of insulin (HCC)  -     Microalbumin / creatinine urine ratio  -     LDL cholesterol, direct; Future  -     HEMOGLOBIN A1C W/ EAG ESTIMATION; Future  Pt A1c 5 8 but he will work on diet and increase activity soon He will submit urine for protein check and has eye appt upcoming     Benign essential hypertension  Bp stable He will be more active as weather improves and will improve diet choices     Mixed hyperlipidemia  LDL slightly higher so pt plans to improve diet and exercise habits        Prevnar 20 next visit      Patient ID: Félix Tamayo is a 77 y o  male  HPI  Pt doing ok He is aware hid diet has not been as healthy this past winter and not as active so feels Hgb A1c cou;d be better He did have covid booster No chest pain or sob Overall feels ok but will be more active with nicer weather       Review of Systems   Constitutional: Negative for chills and fever  HENT: Negative  Eyes: Negative for visual disturbance  Respiratory: Negative for cough and shortness of breath  Cardiovascular: Negative for chest pain, palpitations and leg swelling  Gastrointestinal: Negative for abdominal distention and abdominal pain  Genitourinary: Negative  Musculoskeletal: Negative  Neurological: Negative  Hematological: Negative  Psychiatric/Behavioral: Negative  Past Medical History:   Diagnosis Date    Diabetes mellitus ([place] [address] )     DM (diabetes mellitus) ([place] [address] )     GERD (gastroesophageal reflux disease)     Gout     Hypertension     Osteoarthritis     Sleep apnea      Past Surgical History:   Procedure Laterality Date    HERNIA REPAIR      LAMINECTOMY       Social History     Socioeconomic History    Marital status: /Civil Union     Spouse name: Not on file    Number of children: Not on file    Years of education: Not on file    Highest education level: Not on file   Occupational History    Not on file   Tobacco Use    Smoking status: Former Smoker    Smokeless tobacco: Never Used   Vaping Use    Vaping Use: Never used   Substance and Sexual Activity    Alcohol use: Yes    Drug use: Never    Sexual activity: Not on file   Other Topics Concern    Not on file   Social History Narrative    Not on file     Social Determinants of Health     Financial Resource Strain: Not on file   Food Insecurity: Not on file   Transportation Needs: Not on file   Physical Activity: Not on file   Stress: Not on file   Social Connections: Not on file   Intimate Partner Violence: Not on file   Housing Stability: Not on file     Allergies   Allergen Reactions    Nuts - Food Allergy Anaphylaxis     BMI Counseling: Body mass index is 31 34 kg/m²  The BMI is above normal  Nutrition recommendations include consuming healthier snacks, moderation in carbohydrate intake and increasing intake of lean protein   Exercise recommendations include exercising 3-5 times per week  Rationale for BMI follow-up plan is due to patient being overweight or obese  /78   Pulse 78   Temp 97 5 °F (36 4 °C)   Resp 18   Wt 99 1 kg (218 lb 6 4 oz)   BMI 31 34 kg/m²          Physical Exam  Vitals reviewed  Constitutional:       General: He is not in acute distress  Appearance: Normal appearance  He is not ill-appearing, toxic-appearing or diaphoretic  HENT:      Head: Normocephalic and atraumatic  Right Ear: External ear normal  There is no impacted cerumen  Left Ear: External ear normal  There is no impacted cerumen  Nose: Nose normal       Mouth/Throat:      Mouth: Mucous membranes are dry  Eyes:      General: No scleral icterus  Extraocular Movements: Extraocular movements intact  Conjunctiva/sclera: Conjunctivae normal       Pupils: Pupils are equal, round, and reactive to light  Cardiovascular:      Rate and Rhythm: Normal rate and regular rhythm  Pulses: Normal pulses  no weak pulses          Dorsalis pedis pulses are 2+ on the right side and 2+ on the left side  Posterior tibial pulses are 2+ on the right side and 2+ on the left side  Heart sounds: Normal heart sounds  Pulmonary:      Effort: Pulmonary effort is normal       Breath sounds: Normal breath sounds  Abdominal:      General: Bowel sounds are normal  There is no distension  Palpations: Abdomen is soft  Tenderness: There is no abdominal tenderness  Musculoskeletal:      Cervical back: Normal range of motion and neck supple  No rigidity  Right lower leg: No edema  Left lower leg: No edema  Feet:      Right foot:      Skin integrity: No ulcer, skin breakdown, erythema, warmth, callus or dry skin  Left foot:      Skin integrity: No ulcer, skin breakdown, erythema, warmth, callus or dry skin  Lymphadenopathy:      Cervical: No cervical adenopathy  Skin:     General: Skin is dry  Coloration: Skin is not jaundiced or pale  Neurological:      General: No focal deficit present  Mental Status: He is alert and oriented to person, place, and time  Mental status is at baseline  Cranial Nerves: No cranial nerve deficit  Sensory: No sensory deficit  Psychiatric:         Mood and Affect: Mood normal          Behavior: Behavior normal          Thought Content:  Thought content normal          Judgment: Judgment normal

## 2022-04-25 DIAGNOSIS — M19.90 INFLAMMATORY ARTHRITIS: Primary | ICD-10-CM

## 2022-04-25 RX ORDER — PREDNISONE 10 MG/1
TABLET ORAL
Qty: 40 TABLET | Refills: 0 | Status: SHIPPED | OUTPATIENT
Start: 2022-04-25

## 2022-06-27 DIAGNOSIS — E11.9 TYPE 2 DIABETES MELLITUS WITHOUT COMPLICATION, WITHOUT LONG-TERM CURRENT USE OF INSULIN (HCC): ICD-10-CM

## 2022-06-27 DIAGNOSIS — I35.8 AORTIC VALVE SCLEROSIS: ICD-10-CM

## 2022-06-27 DIAGNOSIS — I10 BENIGN ESSENTIAL HYPERTENSION: ICD-10-CM

## 2022-06-27 DIAGNOSIS — I10 ESSENTIAL HYPERTENSION: ICD-10-CM

## 2022-06-27 DIAGNOSIS — E66.09 CLASS 2 OBESITY DUE TO EXCESS CALORIES IN ADULT, UNSPECIFIED BMI, UNSPECIFIED WHETHER SERIOUS COMORBIDITY PRESENT: ICD-10-CM

## 2022-06-27 DIAGNOSIS — E78.2 MIXED HYPERLIPIDEMIA: ICD-10-CM

## 2022-06-27 RX ORDER — LOSARTAN POTASSIUM 50 MG/1
TABLET ORAL
Qty: 90 TABLET | Refills: 3 | Status: SHIPPED | OUTPATIENT
Start: 2022-06-27

## 2022-06-27 RX ORDER — METFORMIN HYDROCHLORIDE 500 MG/1
TABLET, EXTENDED RELEASE ORAL
Qty: 180 TABLET | Refills: 3 | Status: SHIPPED | OUTPATIENT
Start: 2022-06-27

## 2022-06-27 RX ORDER — ALLOPURINOL 300 MG/1
TABLET ORAL
Qty: 90 TABLET | Refills: 3 | Status: SHIPPED | OUTPATIENT
Start: 2022-06-27

## 2022-06-27 RX ORDER — PANTOPRAZOLE SODIUM 40 MG/1
TABLET, DELAYED RELEASE ORAL
Qty: 90 TABLET | Refills: 3 | Status: SHIPPED | OUTPATIENT
Start: 2022-06-27

## 2022-06-27 RX ORDER — ATORVASTATIN CALCIUM 80 MG/1
TABLET, FILM COATED ORAL
Qty: 90 TABLET | Refills: 3 | Status: SHIPPED | OUTPATIENT
Start: 2022-06-27

## 2022-06-27 RX ORDER — HYDROCHLOROTHIAZIDE 25 MG/1
TABLET ORAL
Qty: 180 TABLET | Refills: 3 | Status: SHIPPED | OUTPATIENT
Start: 2022-06-27

## 2022-08-23 ENCOUNTER — APPOINTMENT (OUTPATIENT)
Dept: LAB | Facility: MEDICAL CENTER | Age: 67
End: 2022-08-23
Payer: MEDICARE

## 2022-08-23 DIAGNOSIS — E11.9 TYPE 2 DIABETES MELLITUS WITHOUT COMPLICATION, WITHOUT LONG-TERM CURRENT USE OF INSULIN (HCC): ICD-10-CM

## 2022-08-23 LAB
CREAT UR-MCNC: 59.1 MG/DL
EST. AVERAGE GLUCOSE BLD GHB EST-MCNC: 131 MG/DL
HBA1C MFR BLD: 6.2 %
LDLC SERPL DIRECT ASSAY-MCNC: 105 MG/DL (ref 0–100)
MICROALBUMIN UR-MCNC: 6.4 MG/L (ref 0–20)
MICROALBUMIN/CREAT 24H UR: 11 MG/G CREATININE (ref 0–30)

## 2022-08-23 PROCEDURE — 83721 ASSAY OF BLOOD LIPOPROTEIN: CPT

## 2022-08-23 PROCEDURE — 83036 HEMOGLOBIN GLYCOSYLATED A1C: CPT

## 2022-08-23 PROCEDURE — 36415 COLL VENOUS BLD VENIPUNCTURE: CPT

## 2022-08-30 ENCOUNTER — OFFICE VISIT (OUTPATIENT)
Dept: FAMILY MEDICINE CLINIC | Facility: CLINIC | Age: 67
End: 2022-08-30
Payer: MEDICARE

## 2022-08-30 VITALS
TEMPERATURE: 97.8 F | HEART RATE: 76 BPM | SYSTOLIC BLOOD PRESSURE: 126 MMHG | BODY MASS INDEX: 31.52 KG/M2 | DIASTOLIC BLOOD PRESSURE: 70 MMHG | HEIGHT: 70 IN | RESPIRATION RATE: 18 BRPM | WEIGHT: 220.2 LBS

## 2022-08-30 DIAGNOSIS — Z23 IMMUNIZATION DUE: ICD-10-CM

## 2022-08-30 DIAGNOSIS — Z00.00 MEDICARE ANNUAL WELLNESS VISIT, SUBSEQUENT: Primary | ICD-10-CM

## 2022-08-30 DIAGNOSIS — E11.9 TYPE 2 DIABETES MELLITUS WITHOUT COMPLICATION, WITHOUT LONG-TERM CURRENT USE OF INSULIN (HCC): ICD-10-CM

## 2022-08-30 DIAGNOSIS — Z23 NEEDS FLU SHOT: ICD-10-CM

## 2022-08-30 PROCEDURE — G0009 ADMIN PNEUMOCOCCAL VACCINE: HCPCS | Performed by: INTERNAL MEDICINE

## 2022-08-30 PROCEDURE — G0438 PPPS, INITIAL VISIT: HCPCS | Performed by: INTERNAL MEDICINE

## 2022-08-30 PROCEDURE — 90677 PCV20 VACCINE IM: CPT | Performed by: INTERNAL MEDICINE

## 2022-08-30 PROCEDURE — G0008 ADMIN INFLUENZA VIRUS VAC: HCPCS | Performed by: INTERNAL MEDICINE

## 2022-08-30 PROCEDURE — 90662 IIV NO PRSV INCREASED AG IM: CPT | Performed by: INTERNAL MEDICINE

## 2022-08-30 NOTE — PATIENT INSTRUCTIONS
Medicare Preventive Visit Patient Instructions  Thank you for completing your Welcome to Medicare Visit or Medicare Annual Wellness Visit today  Your next wellness visit will be due in one year (8/31/2023)  The screening/preventive services that you may require over the next 5-10 years are detailed below  Some tests may not apply to you based off risk factors and/or age  Screening tests ordered at today's visit but not completed yet may show as past due  Also, please note that scanned in results may not display below  Preventive Screenings:  Service Recommendations Previous Testing/Comments   Colorectal Cancer Screening  · Colonoscopy    · Fecal Occult Blood Test (FOBT)/Fecal Immunochemical Test (FIT)  · Fecal DNA/Cologuard Test  · Flexible Sigmoidoscopy Age: 39-70 years old   Colonoscopy: every 10 years (May be performed more frequently if at higher risk)  OR  FOBT/FIT: every 1 year  OR  Cologuard: every 3 years  OR  Sigmoidoscopy: every 5 years  Screening may be recommended earlier than age 39 if at higher risk for colorectal cancer  Also, an individualized decision between you and your healthcare provider will decide whether screening between the ages of 74-80 would be appropriate   Colonoscopy: 05/05/2020  FOBT/FIT: Not on file  Cologuard: Not on file  Sigmoidoscopy: Not on file    Screening Current     Prostate Cancer Screening Individualized decision between patient and health care provider in men between ages of 53-78   Medicare will cover every 12 months beginning on the day after your 50th birthday PSA: 1 1 ng/mL     Screening Current     Hepatitis C Screening Once for adults born between 1945 and 1965  More frequently in patients at high risk for Hepatitis C Hep C Antibody: 11/02/2020    Screening Current   Diabetes Screening 1-2 times per year if you're at risk for diabetes or have pre-diabetes Fasting glucose: 108 mg/dL (3/4/2022)  A1C: 6 2 % (8/23/2022)  Screening Not Indicated  History Diabetes Cholesterol Screening Once every 5 years if you don't have a lipid disorder  May order more often based on risk factors  Lipid panel: 03/04/2022  Screening Not Indicated  History Lipid Disorder      Other Preventive Screenings Covered by Medicare:  1  Abdominal Aortic Aneurysm (AAA) Screening: covered once if your at risk  You're considered to be at risk if you have a family history of AAA or a male between the age of 73-68 who smoking at least 100 cigarettes in your lifetime  2  Lung Cancer Screening: covers low dose CT scan once per year if you meet all of the following conditions: (1) Age 50-69; (2) No signs or symptoms of lung cancer; (3) Current smoker or have quit smoking within the last 15 years; (4) You have a tobacco smoking history of at least 20 pack years (packs per day x number of years you smoked); (5) You get a written order from a healthcare provider  3  Glaucoma Screening: covered annually if you're considered high risk: (1) You have diabetes OR (2) Family history of glaucoma OR (3)  aged 48 and older OR (3)  American aged 72 and older  3  Osteoporosis Screening: covered every 2 years if you meet one of the following conditions: (1) Have a vertebral abnormality; (2) On glucocorticoid therapy for more than 3 months; (3) Have primary hyperparathyroidism; (4) On osteoporosis medications and need to assess response to drug therapy  5  HIV Screening: covered annually if you're between the age of 12-76  Also covered annually if you are younger than 13 and older than 72 with risk factors for HIV infection  For pregnant patients, it is covered up to 3 times per pregnancy      Immunizations:  Immunization Recommendations   Influenza Vaccine Annual influenza vaccination during flu season is recommended for all persons aged >= 6 months who do not have contraindications   Pneumococcal Vaccine   * Pneumococcal conjugate vaccine = PCV13 (Prevnar 13), PCV15 (Vaxneuvance), PCV20 (Prevnar 20)  * Pneumococcal polysaccharide vaccine = PPSV23 (Pneumovax) Adults 2364 years old: 1-3 doses may be recommended based on certain risk factors  Adults 72 years old: 1-2 doses may be recommended based off what pneumonia vaccine you previously received   Hepatitis B Vaccine 3 dose series if at intermediate or high risk (ex: diabetes, end stage renal disease, liver disease)   Tetanus (Td) Vaccine - COST NOT COVERED BY MEDICARE PART B Following completion of primary series, a booster dose should be given every 10 years to maintain immunity against tetanus  Td may also be given as tetanus wound prophylaxis  Tdap Vaccine - COST NOT COVERED BY MEDICARE PART B Recommended at least once for all adults  For pregnant patients, recommended with each pregnancy  Shingles Vaccine (Shingrix) - COST NOT COVERED BY MEDICARE PART B  2 shot series recommended in those aged 48 and above     Health Maintenance Due:      Topic Date Due    Colorectal Cancer Screening  05/05/2025    Hepatitis C Screening  Completed     Immunizations Due:      Topic Date Due    COVID-19 Vaccine (3 - Booster for Moderna series) 07/20/2021    Pneumococcal Vaccine: 65+ Years (2 - PPSV23 or PCV20) 03/18/2022    Influenza Vaccine (1) 09/01/2022     Advance Directives   What are advance directives? Advance directives are legal documents that state your wishes and plans for medical care  These plans are made ahead of time in case you lose your ability to make decisions for yourself  Advance directives can apply to any medical decision, such as the treatments you want, and if you want to donate organs  What are the types of advance directives? There are many types of advance directives, and each state has rules about how to use them  You may choose a combination of any of the following:  · Living will: This is a written record of the treatment you want   You can also choose which treatments you do not want, which to limit, and which to stop at a certain time  This includes surgery, medicine, IV fluid, and tube feedings  · Durable power of  for healthcare [facility]): This is a written record that states who you want to make healthcare choices for you when you are unable to make them for yourself  This person, called a proxy, is usually a family member or a friend  You may choose more than 1 proxy  · Do not resuscitate (DNR) order:  A DNR order is used in case your heart stops beating or you stop breathing  It is a request not to have certain forms of treatment, such as CPR  A DNR order may be included in other types of advance directives  · Medical directive: This covers the care that you want if you are in a coma, near death, or unable to make decisions for yourself  You can list the treatments you want for each condition  Treatment may include pain medicine, surgery, blood transfusions, dialysis, IV or tube feedings, and a ventilator (breathing machine)  · Values history: This document has questions about your views, beliefs, and how you feel and think about life  This information can help others choose the care that you would choose  Why are advance directives important? An advance directive helps you control your care  Although spoken wishes may be used, it is better to have your wishes written down  Spoken wishes can be misunderstood, or not followed  Treatments may be given even if you do not want them  An advance directive may make it easier for your family to make difficult choices about your care  Weight Management   Why it is important to manage your weight:  Being overweight increases your risk of health conditions such as heart disease, high blood pressure, type 2 diabetes, and certain types of cancer  It can also increase your risk for osteoarthritis, sleep apnea, and other respiratory problems  Aim for a slow, steady weight loss  Even a small amount of weight loss can lower your risk of health problems    How to lose weight safely:  A safe and healthy way to lose weight is to eat fewer calories and get regular exercise  You can lose up about 1 pound a week by decreasing the number of calories you eat by 500 calories each day  Healthy meal plan for weight management:  A healthy meal plan includes a variety of foods, contains fewer calories, and helps you stay healthy  A healthy meal plan includes the following:  · Eat whole-grain foods more often  A healthy meal plan should contain fiber  Fiber is the part of grains, fruits, and vegetables that is not broken down by your body  Whole-grain foods are healthy and provide extra fiber in your diet  Some examples of whole-grain foods are whole-wheat breads and pastas, oatmeal, brown rice, and bulgur  · Eat a variety of vegetables every day  Include dark, leafy greens such as spinach, kale, jacqui greens, and mustard greens  Eat yellow and orange vegetables such as carrots, sweet potatoes, and winter squash  · Eat a variety of fruits every day  Choose fresh or canned fruit (canned in its own juice or light syrup) instead of juice  Fruit juice has very little or no fiber  · Eat low-fat dairy foods  Drink fat-free (skim) milk or 1% milk  Eat fat-free yogurt and low-fat cottage cheese  Try low-fat cheeses such as mozzarella and other reduced-fat cheeses  · Choose meat and other protein foods that are low in fat  Choose beans or other legumes such as split peas or lentils  Choose fish, skinless poultry (chicken or turkey), or lean cuts of red meat (beef or pork)  Before you cook meat or poultry, cut off any visible fat  · Use less fat and oil  Try baking foods instead of frying them  Add less fat, such as margarine, sour cream, regular salad dressing and mayonnaise to foods  Eat fewer high-fat foods  Some examples of high-fat foods include french fries, doughnuts, ice cream, and cakes  · Eat fewer sweets  Limit foods and drinks that are high in sugar   This includes candy, cookies, regular soda, and sweetened drinks  Exercise:  Exercise at least 30 minutes per day on most days of the week  Some examples of exercise include walking, biking, dancing, and swimming  You can also fit in more physical activity by taking the stairs instead of the elevator or parking farther away from stores  Ask your healthcare provider about the best exercise plan for you  Alcohol Use and Your Health    Drinking too much can harm your health  Excessive alcohol use leads to about 88,000 death in the [place] each year, and shortens the life of those who diet by almost 30 years  Further, excessive drinking cost the economy $249 billion in 2010  Most excessive drinkers are not alcohol dependent  Excessive alcohol use has immediate effects that increase the risk of many harmful health conditions  These are most often the result of binge drinking  Over time, excessive alcohol use can lead to the development of chronic diseases and other series health problems  What is considered a "drink"? Excessive alcohol use includes:  · Binge Drinking: For women, 4 or more drinks consumed on one occasion  For men, 5 or more drinks consumed on one occasion  · Heavy Drinking: For women, 8 or more drinks per week  For men, 15 or more drinks per week  · Any alcohol used by pregnant women  · Any alcohol used by those under the age of 21 years    If you choose to drink, do so in moderation:  · Do not drink at all if you are under the age of 24, or if you are or may be pregnant, or have health problems that could be made worse by drinking    · For women, up to 1 drink per day  · For men, up to 2 drinks a day    No one should begin drinking or drink more frequently based on potential health benefits    Short-Term Health Risks:  · Injuries: motor vehicle crashes, falls, drownings, burns  · Violence: homicide, suicide, sexual assault, intimate partner violence  · Alcohol poisoning  · Reproductive health: risky sexual behaviors, unintended prengnacy, sexually transmitted diseases, miscarriage, stillbirth, fetal alcohol syndrome    Long-Term Health Risks:  · Chronic diseases: high blood pressure, heart disease, stroke, liver disease, digestive problems  · Cancers: breast, mouth and throat, liver, colon  · Learning and memory problems: dementia, poor school performance  · Mental health: depression, anxiety, insomnia  · Social problems: lost productivity, family problems, unemployment  · Alcohol dependence    For support and more information:  · Substance Abuse and [facility] , [address] [place]  Web Address: https://[employer]/    · Alcoholics Anonymous        Web Address: http://[employer] [facility] info/    https://www cdc gov/alcohol/fact-sheets/alcohol-use htm     © [address] 2018 Information is for End User's use only and may not be sold, redistributed or otherwise used for commercial purposes   All illustrations and images included in CareNotes® are the copyrighted property of [employer]  or [address] [place] [address]

## 2022-08-30 NOTE — PROGRESS NOTES
Assessment and Plan:     Problem List Items Addressed This Visit        Endocrine    Type 2 diabetes mellitus without complication, without long-term current use of insulin ([facility] [address] )    Relevant Orders    HEMOGLOBIN A1C W/ EAG ESTIMATION    Lipid panel       Other    Medicare annual wellness visit, subsequent - Primary      Other Visit Diagnoses     Needs flu shot        Relevant Orders    influenza vaccine, high-dose, PF 0 7 mL (FLUZONE HIGH-DOSE) (Completed)    Immunization due        Relevant Orders    Pneumococcal Conjugate Vaccine 20-valent (Pcv20) (Completed)      Flu and Prevnar 20 today   He will get Fall covid booster and shingrix in the coming weeks  Reviewed labs and encouraged improved diet choices and exercise Recheck labs in 6 months  Eye exam utd  Pt has acp documents        Depression Screening and Follow-up Plan: Patient was screened for depression during today's encounter  They screened negative with a PHQ-2 score of 0  Preventive health issues were discussed with patient, and age appropriate screening tests were ordered as noted in patient's After Visit Summary  Personalized health advice and appropriate referrals for health education or preventive services given if needed, as noted in patient's After Visit Summary  History of Present Illness:     Patient presents for a Medicare Wellness Visit    HPI   Patient Care Team:  Loren Fuentes DO as PCP - General (Internal Medicine)     Review of Systems:     Review of Systems   Constitutional: Negative for chills and fever  HENT: Negative  Respiratory: Negative  Cardiovascular: Negative  Gastrointestinal: Negative  Genitourinary: Negative  Neurological: Negative  Psychiatric/Behavioral: Negative           Problem List:     Patient Active Problem List   Diagnosis    Benign essential hypertension    Class 2 obesity due to excess calories in adult    Mixed hyperlipidemia    Primary osteoarthritis involving multiple joints  Sleep apnea in adult    Type 2 diabetes mellitus without complication, without long-term current use of insulin (HCC)    Aortic valve sclerosis    Primary osteoarthritis of right knee    Chronic idiopathic gout of foot    Medicare annual wellness visit, subsequent      Past Medical and Surgical History:     Past Medical History:   Diagnosis Date    Diabetes mellitus ([place] [address] )     DM (diabetes mellitus) ([place] [address] )     GERD (gastroesophageal reflux disease)     Gout     Hypertension     Osteoarthritis     Sleep apnea      Past Surgical History:   Procedure Laterality Date    HERNIA REPAIR      LAMINECTOMY        Family History:     Family History   Problem Relation Age of Onset    Hypertension Mother     Colon cancer Mother     Hypertension Father     Stroke Father     Prostate cancer Brother       Social History:     Social History     Socioeconomic History    Marital status: /Civil Union     Spouse name: None    Number of children: None    Years of education: None    Highest education level: None   Occupational History    None   Tobacco Use    Smoking status: Former Smoker    Smokeless tobacco: Never Used   Vaping Use    Vaping Use: Never used   Substance and Sexual Activity    Alcohol use: Yes    Drug use: Never    Sexual activity: None   Other Topics Concern    None   Social History Narrative    None     Social Determinants of Health     Financial Resource Strain: Low Risk     Difficulty of Paying Living Expenses: Not hard at all   Food Insecurity: Not on file   Transportation Needs: No Transportation Needs    Lack of Transportation (Medical): No    Lack of Transportation (Non-Medical):  No   Physical Activity: Not on file   Stress: Not on file   Social Connections: Not on file   Intimate Partner Violence: Not on file   Housing Stability: Not on file      Medications and Allergies:     Current Outpatient Medications   Medication Sig Dispense Refill    allopurinol (ZYLOPRIM) 300 mg tablet TAKE 1 TABLET DAILY 90 tablet 3    atorvastatin (LIPITOR) 80 mg tablet TAKE 1 TABLET DAILY 90 tablet 3    EPINEPHrine (EPIPEN) 0 3 mg/0 3 mL SOAJ Inject 0 3mg/0 3ml as needed for allergic rxn      hydrochlorothiazide (HYDRODIURIL) 25 mg tablet TAKE 2 TABLETS DAILY 180 tablet 3    losartan (COZAAR) 50 mg tablet TAKE 1 TABLET DAILY 90 tablet 3    metFORMIN (GLUCOPHAGE-XR) 500 mg 24 hr tablet TAKE 1 TABLET TWICE DAILY  WITH MEALS 180 tablet 3    pantoprazole (PROTONIX) 40 mg tablet TAKE 1 TABLET DAILY 90 tablet 3    predniSONE 10 mg tablet Take as directed with food thru taper 40 tablet 0    VITAMIN D PO Take 2,000 Int'l Units by mouth daily       No current facility-administered medications for this visit  Allergies   Allergen Reactions    Nuts - Food Allergy Anaphylaxis      Immunizations:     Immunization History   Administered Date(s) Administered    COVID-19 MODERNA VACC 0 5 ML IM 01/23/2021, 02/20/2021    Influenza Injectable, MDCK, Preservative Free, Quadrivalent, 0 5 mL 09/24/2019, 10/19/2020    Influenza, high dose seasonal 0 7 mL 08/30/2022    Pneumococcal Conjugate 13-Valent 03/18/2021    Pneumococcal Conjugate Vaccine 20-valent (Pcv20), Polysace 08/30/2022    Tdap 10/19/2020      Health Maintenance:         Topic Date Due    Colorectal Cancer Screening  05/05/2025    Hepatitis C Screening  Completed         Topic Date Due    COVID-19 Vaccine (3 - Booster for Sandi  series) 07/20/2021      Medicare Screening Tests and Risk Assessments:     Maribeth Allred is here for his Subsequent Wellness visit  Last Medicare Wellness visit information reviewed, patient interviewed, no change since last AWV  Health Risk Assessment:   Patient rates overall health as very good  Patient feels that their physical health rating is same  Patient is very satisfied with their life  Eyesight was rated as same  Hearing was rated as same   Patient feels that their emotional and mental health rating is same  Patients states they are never, rarely angry  Patient states they are never, rarely unusually tired/fatigued  Pain experienced in the last 7 days has been some  Patient's pain rating has been 3/10  Patient states that he has experienced no weight loss or gain in last 6 months  Depression Screening:   PHQ-2 Score: 0      Fall Risk Screening: In the past year, patient has experienced: no history of falling in past year      Home Safety:  Patient has trouble with stairs inside or outside of their home  Patient has working smoke alarms and has no working carbon monoxide detector  Home safety hazards include: none  Nutrition:   Current diet is Regular  Medications:   Patient is not currently taking any over-the-counter supplements  Patient is able to manage medications  Activities of Daily Living (ADLs)/Instrumental Activities of Daily Living (IADLs):   Walk and transfer into and out of bed and chair?: Yes  Dress and groom yourself?: Yes    Bathe or shower yourself?: Yes    Feed yourself? Yes  Do your laundry/housekeeping?: Yes  Manage your money, pay your bills and track your expenses?: Yes  Make your own meals?: Yes    Do your own shopping?: Yes    Previous Hospitalizations:   Any hospitalizations or ED visits within the last 12 months?: No      Advance Care Planning:   Living will: Yes    Durable POA for healthcare:  Yes    Advanced directive: Yes    End of Life Decisions reviewed with patient: Yes    Provider agrees with end of life decisions: Yes      PREVENTIVE SCREENINGS      Cardiovascular Screening:    General: Screening Not Indicated and History Lipid Disorder      Diabetes Screening:     General: Screening Not Indicated and History Diabetes      Colorectal Cancer Screening:     General: Screening Current      Prostate Cancer Screening:    General: Screening Current      Abdominal Aortic Aneurysm (AAA) Screening:    Risk factors include: age between 73-69 yo and tobacco use        Lung Cancer Screening:     General: Screening Not Indicated      Hepatitis C Screening:    General: Screening Current    Screening, Brief Intervention, and Referral to Treatment (SBIRT)    Screening  Typical number of drinks in a day: 1  Typical number of drinks in a week: 7  Interpretation: Low risk drinking behavior  AUDIT-C Screenin) How often did you have a drink containing alcohol in the past year? 4 or more times a week  2) How many drinks did you have on a typical day when you were drinking in the past year? 1 to 2  3) How often did you have 6 or more drinks on one occasion in the past year? never    AUDIT-C Score: 4  Interpretation: Score 4-12 (male): POSITIVE screen for alcohol misuse    AUDIT Screenin) How often during the last year have you found that you were not able to stop drinking once you had started? 0 - never  5) How often during the last year have you failed to do what was normally expected from you because of drinking? 0 - never  6) How often during the last year have you needed a first drink in the morning to get yourself going after a heavy drinking session?  0 - never  7) How often during the last year have you had a feeling of guilt or remorse after drinking? 0 - never  8) How often during the last year have you been unable to remember what happened the night before because you had been drinking? 0 - never  9) Have you or someone else been injured as a result of your drinking? 0 - no  10) Has a relative or friend or a doctor or another health worker been concerned about your drinking or suggested you cut down? 0 - no    AUDIT Score: 4  Interpretation: Low risk alcohol consumption    Single Item Drug Screening:  How often have you used an illegal drug (including marijuana) or a prescription medication for non-medical reasons in the past year? never    Single Item Drug Screen Score: 0  Interpretation: Negative screen for possible drug use disorder    No exam data present     Physical Exam:     /70   Pulse 76   Temp 97 8 °F (36 6 °C)   Resp 18   Ht 5' 10" (1 778 m)   Wt 99 9 kg (220 lb 3 2 oz)   BMI 31 60 kg/m²     Physical Exam  Vitals reviewed  Constitutional:       General: He is not in acute distress  Appearance: Normal appearance  He is not ill-appearing, toxic-appearing or diaphoretic  HENT:      Head: Normocephalic and atraumatic  Right Ear: External ear normal       Left Ear: External ear normal       Nose: Nose normal       Mouth/Throat:      Mouth: Mucous membranes are moist    Eyes:      General: No scleral icterus  Extraocular Movements: Extraocular movements intact  Conjunctiva/sclera: Conjunctivae normal       Pupils: Pupils are equal, round, and reactive to light  Cardiovascular:      Rate and Rhythm: Normal rate and regular rhythm  Pulses: Normal pulses  Heart sounds: Normal heart sounds  Pulmonary:      Effort: Pulmonary effort is normal       Breath sounds: Normal breath sounds  Abdominal:      General: Bowel sounds are normal  There is no distension  Palpations: Abdomen is soft  Tenderness: There is no abdominal tenderness  Musculoskeletal:      Cervical back: Normal range of motion and neck supple  No rigidity  Right lower leg: No edema  Left lower leg: No edema  Comments: Crepitance of knees getting eufllexa   Lymphadenopathy:      Cervical: No cervical adenopathy  Skin:     General: Skin is dry  Neurological:      General: No focal deficit present  Mental Status: He is alert and oriented to person, place, and time  Mental status is at baseline  Psychiatric:         Mood and Affect: Mood normal          Behavior: Behavior normal          Thought Content:  Thought content normal          Judgment: Judgment normal           Rona Cervantes DO

## 2022-10-29 PROBLEM — Z00.00 MEDICARE ANNUAL WELLNESS VISIT, SUBSEQUENT: Status: RESOLVED | Noted: 2022-08-30 | Resolved: 2022-10-29

## 2023-02-15 ENCOUNTER — RA CDI HCC (OUTPATIENT)
Dept: OTHER | Facility: HOSPITAL | Age: 68
End: 2023-02-15

## 2023-02-15 NOTE — PROGRESS NOTES
[facility]  coding opportunities          Chart Reviewed number of suggestions sent to Provider: 1     Patients Insurance     Medicare Insurance: Medicare        [id number]

## 2023-02-21 ENCOUNTER — APPOINTMENT (OUTPATIENT)
Dept: LAB | Facility: MEDICAL CENTER | Age: 68
End: 2023-02-21

## 2023-02-21 DIAGNOSIS — E11.9 TYPE 2 DIABETES MELLITUS WITHOUT COMPLICATION, WITHOUT LONG-TERM CURRENT USE OF INSULIN (HCC): ICD-10-CM

## 2023-02-21 LAB
CHOLEST SERPL-MCNC: 168 MG/DL
HDLC SERPL-MCNC: 49 MG/DL
LDLC SERPL CALC-MCNC: 83 MG/DL (ref 0–100)
NONHDLC SERPL-MCNC: 119 MG/DL
TRIGL SERPL-MCNC: 179 MG/DL

## 2023-02-22 ENCOUNTER — OFFICE VISIT (OUTPATIENT)
Dept: FAMILY MEDICINE CLINIC | Facility: CLINIC | Age: 68
End: 2023-02-22

## 2023-02-22 VITALS
TEMPERATURE: 97.3 F | SYSTOLIC BLOOD PRESSURE: 124 MMHG | HEART RATE: 78 BPM | BODY MASS INDEX: 32.13 KG/M2 | WEIGHT: 224.4 LBS | RESPIRATION RATE: 18 BRPM | DIASTOLIC BLOOD PRESSURE: 76 MMHG | HEIGHT: 70 IN

## 2023-02-22 DIAGNOSIS — G25.81 RESTLESS LEG SYNDROME: ICD-10-CM

## 2023-02-22 DIAGNOSIS — M54.50 CHRONIC LOW BACK PAIN WITHOUT SCIATICA, UNSPECIFIED BACK PAIN LATERALITY: Primary | ICD-10-CM

## 2023-02-22 DIAGNOSIS — G89.29 CHRONIC LOW BACK PAIN WITHOUT SCIATICA, UNSPECIFIED BACK PAIN LATERALITY: Primary | ICD-10-CM

## 2023-02-22 DIAGNOSIS — E11.9 TYPE 2 DIABETES MELLITUS WITHOUT COMPLICATION, WITHOUT LONG-TERM CURRENT USE OF INSULIN (HCC): ICD-10-CM

## 2023-02-22 DIAGNOSIS — N18.31 STAGE 3A CHRONIC KIDNEY DISEASE (HCC): ICD-10-CM

## 2023-02-22 LAB
EST. AVERAGE GLUCOSE BLD GHB EST-MCNC: 120 MG/DL
HBA1C MFR BLD: 5.8 %

## 2023-02-22 RX ORDER — PRAMIPEXOLE DIHYDROCHLORIDE 0.12 MG/1
0.12 TABLET ORAL
Qty: 30 TABLET | Refills: 5 | Status: SHIPPED | OUTPATIENT
Start: 2023-02-22

## 2023-02-22 NOTE — PROGRESS NOTES
Name: Diana Chen      : 1955      MRN: [id number]  Encounter Provider: Janet Shea DO  Encounter Date: 2023   Encounter department: [address] [place] [address] Road     1  Chronic low back pain without sciatica, unspecified back pain laterality  -     Ambulatory Referral to Physical Therapy; Future  Referral for therapy to further eval sxs/gait     2  Stage 3a chronic kidney disease (HCC)  -     Comprehensive metabolic panel; Future  -     Microalbumin / creatinine urine ratio  -     HEMOGLOBIN A1C W/ EAG ESTIMATION; Future  Increase hydration and recheck gfr     3  Type 2 diabetes mellitus without complication, without long-term current use of insulin (HCC)  -     Comprehensive metabolic panel; Future  -     Microalbumin / creatinine urine ratio  -     HEMOGLOBIN A1C W/ EAG ESTIMATION; Future  A1c pending and recheck 3 months Pt is improving diet pot holidays     4  Restless leg syndrome  -     pramipexole (MIRAPEX) 0 125 mg tablet; Take 1 tablet (0 125 mg total) by mouth daily at bedtime  Start low dose Mirapex, Pt eval and increase water intake       BMI Counseling: Body mass index is 32 2 kg/m²  The BMI is above normal  Nutrition recommendations include increasing intake of lean protein  Exercise recommendations include exercising 3-5 times per week  Rationale for BMI follow-up plan is due to patient being overweight or obese  Depression Screening and Follow-up Plan: Patient was screened for depression during today's encounter  They screened negative with a PHQ-2 score of 0  Rto 3 months     Subjective      HPI   Pt doing ok overall although knee djd has been more progressive/sxs daily now despite injection therapy No falls He is not exercising as in past His sxs are most noted/weakness in AM No chest pain or sob He had labs yesterday so A1c result pending He has cramping ?  Restless leg symptoms at night most noted He does feel some back discomfort and unsteady at times   Review of Systems   Constitutional: Negative for chills and fever  HENT: Negative  Eyes: Negative for visual disturbance  Respiratory: Negative for cough and shortness of breath  Cardiovascular: Negative for chest pain, palpitations and leg swelling  Gastrointestinal: Negative for abdominal distention and abdominal pain  Musculoskeletal: Negative  Neurological: Negative for dizziness, light-headedness and headaches  Psychiatric/Behavioral: Negative for sleep disturbance  The patient is not nervous/anxious  Current Outpatient Medications on File Prior to Visit   Medication Sig   • allopurinol (ZYLOPRIM) 300 mg tablet TAKE 1 TABLET DAILY   • atorvastatin (LIPITOR) 80 mg tablet TAKE 1 TABLET DAILY   • EPINEPHrine (EPIPEN) 0 3 mg/0 3 mL SOAJ Inject 0 3mg/0 3ml as needed for allergic rxn   • hydrochlorothiazide (HYDRODIURIL) 25 mg tablet TAKE 2 TABLETS DAILY   • losartan (COZAAR) 50 mg tablet TAKE 1 TABLET DAILY   • metFORMIN (GLUCOPHAGE-XR) 500 mg 24 hr tablet TAKE 1 TABLET TWICE DAILY  WITH MEALS   • pantoprazole (PROTONIX) 40 mg tablet TAKE 1 TABLET DAILY   • VITAMIN D PO Take 2,000 Int'l Units by mouth daily   • predniSONE 10 mg tablet Take as directed with food thru taper (Patient not taking: Reported on 2/22/2023)       Objective     /76   Pulse 78   Temp (!) 97 3 °F (36 3 °C) (Temporal)   Resp 18   Ht 5' 10" (1 778 m)   Wt 102 kg (224 lb 6 4 oz)   BMI 32 20 kg/m²     Physical Exam  Vitals reviewed  Constitutional:       General: He is not in acute distress  Appearance: Normal appearance  He is not ill-appearing, toxic-appearing or diaphoretic  HENT:      Head: Normocephalic and atraumatic  Right Ear: External ear normal       Left Ear: External ear normal       Nose: Nose normal       Mouth/Throat:      Mouth: Mucous membranes are dry  Eyes:      General: No scleral icterus  Extraocular Movements: Extraocular movements intact  Conjunctiva/sclera: Conjunctivae normal       Pupils: Pupils are equal, round, and reactive to light  Cardiovascular:      Rate and Rhythm: Normal rate and regular rhythm  Pulses: Normal pulses  Heart sounds: Normal heart sounds  Pulmonary:      Effort: Pulmonary effort is normal  No respiratory distress  Breath sounds: Normal breath sounds  No wheezing  Abdominal:      General: Bowel sounds are normal  There is no distension  Palpations: Abdomen is soft  Tenderness: There is no abdominal tenderness  Musculoskeletal:      Cervical back: Normal range of motion and neck supple  No rigidity  Right lower leg: No edema  Left lower leg: No edema  Skin:     General: Skin is dry  Coloration: Skin is not jaundiced or pale  Neurological:      General: No focal deficit present  Mental Status: He is alert and oriented to person, place, and time  Mental status is at baseline  Psychiatric:         Mood and Affect: Mood normal          Behavior: Behavior normal          Thought Content:  Thought content normal          Judgment: Judgment normal        Osie Sings, DO

## 2023-03-23 ENCOUNTER — EVALUATION (OUTPATIENT)
Dept: PHYSICAL THERAPY | Facility: CLINIC | Age: 68
End: 2023-03-23

## 2023-03-23 DIAGNOSIS — M25.561 CHRONIC PAIN OF BOTH KNEES: ICD-10-CM

## 2023-03-23 DIAGNOSIS — G89.29 CHRONIC LOW BACK PAIN WITHOUT SCIATICA, UNSPECIFIED BACK PAIN LATERALITY: Primary | ICD-10-CM

## 2023-03-23 DIAGNOSIS — M25.562 CHRONIC PAIN OF BOTH KNEES: ICD-10-CM

## 2023-03-23 DIAGNOSIS — G89.29 CHRONIC PAIN OF BOTH KNEES: ICD-10-CM

## 2023-03-23 DIAGNOSIS — M54.50 CHRONIC LOW BACK PAIN WITHOUT SCIATICA, UNSPECIFIED BACK PAIN LATERALITY: Primary | ICD-10-CM

## 2023-03-23 NOTE — PROGRESS NOTES
PT Evaluation     Today's date: 3/23/2023  Patient name: Aaron Halsted  : 1955  MRN: [id number]  Referring provider: Darío Begum DO  Dx:   Encounter Diagnosis     ICD-10-CM    1  Chronic low back pain without sciatica, unspecified back pain laterality  M54 50 Ambulatory Referral to Physical Therapy    G89 29       2  Chronic pain of both knees  M25 561     M25 562     G89 29           Start Time: 0850  Stop Time: 5032  Total time in clinic (min): 65 minutes    Assessment  Assessment details: Patient is a 78 yo male with medical diagnosis of chronic bilateral LBP  Following a thorough physical therapy evaluation, the patient demonstrates decreased lumbar ROM, impaired hip and knee stability, impaired gait and decreased hip and knee ROM  All these factors likely result in increased strain on low back and knees during functional mobility and causing pain  Functionally, patient is limited in being able to complete stairs reciprocally and experiences pain in both knee and back when walking or standing for prolonged periods  Following completion of heel slides, right and left knee ROM improved by 12* and 3*, respectively  Also, demonstrates improved gait mechanics and pain during gait following HEP exercises  Both indicating good prognosis c/ PT  Patient will benefit from skilled physical therapy treatment to address the aforementioned impairments and functional deficits, accomplish patient goals and return patient to OF  Impairments: abnormal gait, abnormal or restricted ROM, abnormal movement, activity intolerance, impaired physical strength, lacks appropriate home exercise program and pain with function    Goals  ST-3 weeks  Lumbar ROM 10% improvement in all direction  Knee ROM 0-120* b/l    LT weeks  Ambulate with minimal pain  90% improvement in knee and lumbar pain  SLS: 30 sec or greater b/l  LE Strength >= 4+/5 or greater     Independent with strengthening program   Complete stairs reciprocally  Plan  Plan details: TE, NMR, TA, MT, self-care, and modalities as needed in order to progress through skilled PT focused on ROM, strength, balance, motor control  Patient would benefit from: skilled physical therapy  Planned modality interventions: cryotherapy and thermotherapy: hydrocollator packs  Planned therapy interventions: manual therapy, neuromuscular re-education, patient education, self care, therapeutic activities, therapeutic exercise and home exercise program  Frequency: 2x week  Duration in weeks: 8  Treatment plan discussed with: patient        Subjective Evaluation    History of Present Illness  Mechanism of injury: IE: Patient is a 80 yo male presenting with chronic low back pain (R slightly worse than L) and bilateral knee pain (R>L)  Symptoms began gradually several years ago  Back has been many years possibly beginning in 2015  Knees started more suddenly in 2019  When walking, if his knee gets stiff he can stretch his knee to bend and walk further after that  Walking, stair climbing and weightbearing activity increases both knee and back pain  Rest, ibuprofen, stretching improve both back and knee pain  Patients primary goals for PT are to decrease pain in low back and knees and increase mobility  Back Pain: Remains around 4/10  Knee Pain: Ranges from 6-7/10  Pain  Current pain ratin    Patient Goals  Patient goals for therapy: decreased pain, increased motion, return to sport/leisure activities, increased strength and improved balance          Objective    Observation:     -Gait: Short step length, wider RICHIE, lateral weight shifting (Trendelenburg present)     -Stairs: Step-to pattern, ascending better than descending  Decreased ecc control on descent     -Transfers: Back and hip ROM stiff t/o transfers      Neuro Screen:  LE Clonus (R/L): (-) b/l  DTRs:      -L3/4 (Patellar, R/L): 2+/2+     -S1 (Achilles tendon; R/L): 2+/2+    Lumbar ROM (degrees):   FLX: 25%  EXT: 25%  SB (R/L): 25%/25%    Lumbar/SI/Hip Special Tests:  Passive SLR (R/L): (+) hamstring tightness @ 35*, crossed SLR (-) b/l    Hip ROM (degrees):  ER: WNL  IR: Significantly limited beyond neutral (R<L ROM)  FLX: <90* b/l (L<R ROM)  EXT: Slightly limited on left  Hip Strength (MMT Grades):  FLX (Supine, R/L): 4-/4-  ABD (Side-lying, R/L): 4-/4-    Knee ROM (R/L):   R: 1 finger-113  L: 2 finger-95    Knee Strength (MMT Grades): Weaker into end range extension, c/ quad set and TKE during gait  EXT (R/L): 4/4  FLX (R/L): 4/4         Precautions: None      Date 3/23       Manuals                        Neuro Re-Ed        SLS        Sidestepping        TB Supine Clams        S/Lying Clams        Bridge        [facility]                Ther Ex        LTRs x20ea       Gastroc Stretch 30"x2ea Wall       Hamstring Stretch 30"x2ea Step       Heel Slides 5"x20 + QS+SLR      LAQ nv       HC nv       SLR        TKE nv       Step Flexion        HR/TR        Lumbar Extension Machine        Lumbar Flexion Machine        Seated Flexion c/ SB: 3-way nv       Standing SLR 3 Way        Leg Press        Piriformis Stretch        NuStep nv               Ther Activity        Step-ups: Fwd        Step-downs: Lat        Sit to [employer]        Obstacle Course                                Gait Training        Ambulation        Stairs                Modalities        CP                    Self Care: Provided patient c/ handout c/ HEP  Patient demonstrated each exercise c/ good form and verbalized understanding of expectations c/ HEP  Access Code: [email or web address]  URL: [email or web address]  Date: 03/23/2023  Prepared by: Candace Chávez    Exercises  - Gastroc Stretch on Wall  - 1 x daily - 7 x weekly - 4 reps - 30 seconds hold  - Standing Hamstring Stretch with Step  - 1 x daily - 7 x weekly - 4 reps - 30 seconds hold  - Supine Heel Slide  - 1 x daily - 7 x weekly - 20 reps - 5 seconds hold  - Supine Lower Trunk Rotation  - 1 x daily - 7 x weekly - 20 reps

## 2023-03-28 ENCOUNTER — OFFICE VISIT (OUTPATIENT)
Dept: PHYSICAL THERAPY | Facility: CLINIC | Age: 68
End: 2023-03-28

## 2023-03-28 DIAGNOSIS — M25.561 CHRONIC PAIN OF BOTH KNEES: ICD-10-CM

## 2023-03-28 DIAGNOSIS — M54.50 CHRONIC LOW BACK PAIN WITHOUT SCIATICA, UNSPECIFIED BACK PAIN LATERALITY: Primary | ICD-10-CM

## 2023-03-28 DIAGNOSIS — G89.29 CHRONIC LOW BACK PAIN WITHOUT SCIATICA, UNSPECIFIED BACK PAIN LATERALITY: Primary | ICD-10-CM

## 2023-03-28 DIAGNOSIS — M25.562 CHRONIC PAIN OF BOTH KNEES: ICD-10-CM

## 2023-03-28 DIAGNOSIS — G89.29 CHRONIC PAIN OF BOTH KNEES: ICD-10-CM

## 2023-03-28 NOTE — PROGRESS NOTES
"Daily Note     Today's date: 3/28/2023  Patient name: Erik Boswell  : 1955  MRN: [id number]  Referring provider: Nga Eng DO  Dx:   Encounter Diagnosis     ICD-10-CM    1  Chronic low back pain without sciatica, unspecified back pain laterality  M54 50     G89 29       2  Chronic pain of both knees  M25 561     M25 562     G89 29           Start Time: 1000  Stop Time: 1100  Total time in clinic (min): 60 minutes    Subjective: Patient reports he worked on cleaning his [employer] over the weekend and was sore in his knees and low back after that  Stretches feel good  Objective: See treatment diary below      Assessment: Tolerated treatment well  Patient demonstrated fatigue post treatment, exhibited good technique with therapeutic exercises and would benefit from continued PT  Demonstrates good technique c/ all therapeutic exercises added today, requiring only initial verbal cues and demonstration to perform well  Fatigued following all exercises in b/l LEs  Educated patient about normal muscle soreness post exercise and discussed taking note of symptoms prior to next appointment  He verbalized understanding of all education provided  Plan: Continue per plan of care        Precautions: None      Date 3/23 3/28      Manuals                        Neuro Re-Ed        SLS        Sidestepping  nv      TB Supine Clams        S/Lying Clams        Bridge  nv      EMILIA  P3 2x10      Pallof Press                Ther Ex        LTRs x20ea nv      Gastroc Stretch 30\"x2ea Wall nv      Hamstring Stretch 30\"x2ea Step 30\"x2ea Step      Heel Slides+ QS+SLR(3/28) 5\"x20 5\"x20ea      LAQ nv P2 3x10      HC nv P4 3x10 P4/5     TKE nv L3 3x10ea      Step Flexion        HR/TR  3x10 on 4\" step      Lumbar Extension Machine  70# 2x10      Lumbar Flexion Machine        Seated Flexion c/ SB: 3-way nv x10ea      Standing SLR 3 Way        Leg Press        Piriformis Stretch        NuStep nv L4 7'            " "  Ther Activity        Step-ups: Fwd  6\" 2x10ea      Step-downs: Lat        Sit to Stands  2x10 no UE      Wall Squat        Obstacle Course                                Gait Training        Ambulation        Stairs                Modalities        CP                    Access Code: [email or web address]  URL: [email or web address]  Date: 03/23/2023  Prepared by: Esther Tracey    Exercises  - Gastroc Stretch on Wall  - 1 x daily - 7 x weekly - 4 reps - 30 seconds hold  - Standing Hamstring Stretch with Step  - 1 x daily - 7 x weekly - 4 reps - 30 seconds hold  - Supine Heel Slide  - 1 x daily - 7 x weekly - 20 reps - 5 seconds hold  - Supine Lower Trunk Rotation  - 1 x daily - 7 x weekly - 20 reps           "

## 2023-03-31 ENCOUNTER — OFFICE VISIT (OUTPATIENT)
Dept: PHYSICAL THERAPY | Facility: CLINIC | Age: 68
End: 2023-03-31

## 2023-03-31 DIAGNOSIS — M25.562 CHRONIC PAIN OF BOTH KNEES: ICD-10-CM

## 2023-03-31 DIAGNOSIS — M54.50 CHRONIC LOW BACK PAIN WITHOUT SCIATICA, UNSPECIFIED BACK PAIN LATERALITY: Primary | ICD-10-CM

## 2023-03-31 DIAGNOSIS — M25.561 CHRONIC PAIN OF BOTH KNEES: ICD-10-CM

## 2023-03-31 DIAGNOSIS — G89.29 CHRONIC PAIN OF BOTH KNEES: ICD-10-CM

## 2023-03-31 DIAGNOSIS — G89.29 CHRONIC LOW BACK PAIN WITHOUT SCIATICA, UNSPECIFIED BACK PAIN LATERALITY: Primary | ICD-10-CM

## 2023-03-31 NOTE — PROGRESS NOTES
"Daily Note     Today's date: 3/31/2023  Patient name: Manoj Dyson  : 1955  MRN: [id number]  Referring provider: Irma Quinn DO  Dx:   Encounter Diagnosis     ICD-10-CM    1  Chronic low back pain without sciatica, unspecified back pain laterality  M54 50     G89 29       2  Chronic pain of both knees  M25 561     M25 562     G89 29                      Subjective: Pt reports some mild soreness with exercises last visit  States he is doing well overall  Objective: See treatment diary below      Assessment: Tolerated treatment well  Patient exhibited good technique with therapeutic exercises  Pt with mild c/o fatigue with today's exercises  Cont to make slow steady gains with program  Limited knee  Flx AROM as noted with heel slides today  Plan: Continue per plan of care        Precautions: None      Date 3/23 3/28 3/31     Manuals                        Neuro Re-Ed        SLS        Sidestepping  nv      TB Supine Clams        S/Lying Clams        Bridge  nv 2/10     EMILIA  P3 2x10 P3 2/10     Pallof Press                Ther Ex        LTRs x20ea nv 20x ea     Gastroc Stretch 30\"x2ea Wall nv 30\" 2 ea      Hamstring Stretch 30\"x2ea Step 30\"x2ea Step 30\"x2 step     Heel Slides+ QS+SLR(3/28) 5\"x20 5\"x20ea 5\" x20 ea     LAQ nv P2 3x10 P2 3/10     HC nv P4 3x10 P5 3/10     TKE nv L3 3x10ea L3 3/10 ea     Step Flexion        HR/TR  3x10 on 4\" step 3/10 4\" step     Lumbar Extension Machine  70# 2x10 70# 2/10     Lumbar Flexion Machine        Seated Flexion c/ SB: 3-way nv x10ea x10 ea     Standing SLR 3 Way   10x ea     Leg Press        Piriformis Stretch        NuStep nv L4 7' L4 10'             Ther Activity        Step-ups: Fwd  6\" 2x10ea 6\" 2/10 ea     Step-downs: Lat        Sit to Stands  2x10 no UE NV     Wall Squat        Obstacle Course                                Gait Training        Ambulation        Stairs                Modalities        CP                    Access Code: " [email or web address]  URL: [email or web address]  Date: 03/23/2023  Prepared by: Magalys Darling    Exercises  - Gastroc Stretch on Wall  - 1 x daily - 7 x weekly - 4 reps - 30 seconds hold  - Standing Hamstring Stretch with Step  - 1 x daily - 7 x weekly - 4 reps - 30 seconds hold  - Supine Heel Slide  - 1 x daily - 7 x weekly - 20 reps - 5 seconds hold  - Supine Lower Trunk Rotation  - 1 x daily - 7 x weekly - 20 reps

## 2023-04-03 ENCOUNTER — OFFICE VISIT (OUTPATIENT)
Dept: PHYSICAL THERAPY | Facility: CLINIC | Age: 68
End: 2023-04-03

## 2023-04-03 DIAGNOSIS — M25.562 CHRONIC PAIN OF BOTH KNEES: ICD-10-CM

## 2023-04-03 DIAGNOSIS — G89.29 CHRONIC LOW BACK PAIN WITHOUT SCIATICA, UNSPECIFIED BACK PAIN LATERALITY: Primary | ICD-10-CM

## 2023-04-03 DIAGNOSIS — G89.29 CHRONIC PAIN OF BOTH KNEES: ICD-10-CM

## 2023-04-03 DIAGNOSIS — M54.50 CHRONIC LOW BACK PAIN WITHOUT SCIATICA, UNSPECIFIED BACK PAIN LATERALITY: Primary | ICD-10-CM

## 2023-04-03 DIAGNOSIS — M25.561 CHRONIC PAIN OF BOTH KNEES: ICD-10-CM

## 2023-04-03 NOTE — PROGRESS NOTES
"Daily Note     Today's date: 4/3/2023  Patient name: Natali Estevez  : 1955  MRN: [id number]  Referring provider: Tameka Samuels DO  Dx:   Encounter Diagnosis     ICD-10-CM    1  Chronic low back pain without sciatica, unspecified back pain laterality  M54 50     G89 29       2  Chronic pain of both knees  M25 561     M25 562     G89 29           Start Time: 825  Stop Time: 936  Total time in clinic (min): 71 minutes    Subjective: Reports L knee is feeling really good, R knee pain does not increase after therapy, but continues to be worse than L  Objective: See treatment diary below      Assessment: Tolerated treatment well  Patient demonstrated fatigue post treatment, exhibited good technique with therapeutic exercises and would benefit from continued PT  L knee flexion improving close to WNL, R knee flexion continues to be limited in ROM, but improving as well  Incorporated pallof press and lateral step downs to increase work c/ core and quadriceps strengthening, respectively  Denies pain c/ 2\" lateral step downs, but quad very fatigeud following exercise b/l  Plan: Continue per plan of care        Precautions: None      Date 3/23 3/28 3/31 4/3    Manuals                        Neuro Re-Ed        SLS        Sidestepping  nv  nv    TB Supine Clams        S/Lying Clams        Bridge  nv 2/10 2/10    EMILIA  P3 2x10 P3 2/10 P3 2/10    Pallof Press    P1 3/10            Ther Ex        LTRs x20ea nv 20x ea HEP    Gastroc Stretch 30\"x2ea Wall nv 30\" 2 ea  HEP    Hamstring Stretch 30\"x2ea Step 30\"x2ea Step 30\"x2 step HEP    Heel Slides+ QS+SLR(3/28) 5\"x20 5\"x20ea 5\" x20 ea 5\"/20ea    LAQ nv P2 3x10 P2 3/10 P2 3/10    HC nv P4 3x10 P5 3/10 P5 3/10    TKE nv L3 3x10ea L3 3/10 ea L5 3/10ea    Step Flexion        HR/TR  3x10 on 4\" step 3/10 4\" step 3/10 4\" step    Lumbar Extension Machine  70# 2x10 70# 2/10 80# 2/10    Lumbar Flexion Machine        Seated Flexion c/ SB: 3-way nv x10ea x10 ea x10ea  " "  Standing SLR 3 Way   10x ea np    Leg Press        Piriformis Stretch        NuStep nv L4 7' L4 10' L4 10'            Ther Activity        Step-ups: Fwd  6\" 2x10ea 6\" 2/10 ea 6\" 2/10ea    Step-downs: Lat    2\" x15ea    Sit to Stands  2x10 no UE NV 2x10 no UE    Wall Squat        Obstacle Course                                Gait Training        Ambulation        Stairs                Modalities        CP                    Access Code: [email or web address]  URL: [email or web address]  Date: 03/23/2023  Prepared by: Willis Gonzalez    Exercises  - Gastroc Stretch on Wall  - 1 x daily - 7 x weekly - 4 reps - 30 seconds hold  - Standing Hamstring Stretch with Step  - 1 x daily - 7 x weekly - 4 reps - 30 seconds hold  - Supine Heel Slide  - 1 x daily - 7 x weekly - 20 reps - 5 seconds hold  - Supine Lower Trunk Rotation  - 1 x daily - 7 x weekly - 20 reps               "

## 2023-04-05 ENCOUNTER — OFFICE VISIT (OUTPATIENT)
Dept: PHYSICAL THERAPY | Facility: CLINIC | Age: 68
End: 2023-04-05

## 2023-04-05 DIAGNOSIS — M25.561 CHRONIC PAIN OF BOTH KNEES: ICD-10-CM

## 2023-04-05 DIAGNOSIS — M54.50 CHRONIC LOW BACK PAIN WITHOUT SCIATICA, UNSPECIFIED BACK PAIN LATERALITY: Primary | ICD-10-CM

## 2023-04-05 DIAGNOSIS — G89.29 CHRONIC LOW BACK PAIN WITHOUT SCIATICA, UNSPECIFIED BACK PAIN LATERALITY: Primary | ICD-10-CM

## 2023-04-05 DIAGNOSIS — M25.562 CHRONIC PAIN OF BOTH KNEES: ICD-10-CM

## 2023-04-05 DIAGNOSIS — G89.29 CHRONIC PAIN OF BOTH KNEES: ICD-10-CM

## 2023-04-05 NOTE — PROGRESS NOTES
"Daily Note     Today's date: 2023  Patient name: Shavon Mcadams  : 1955  MRN: [id number]  Referring provider: Molly Pereira DO  Dx:   Encounter Diagnosis     ICD-10-CM    1  Chronic low back pain without sciatica, unspecified back pain laterality  M54 50     G89 29       2  Chronic pain of both knees  M25 561     M25 562     G89 29           Start Time: 08  Stop Time: 935  Total time in clinic (min): 65 minutes    Subjective: Patient reports he is feeling stiff this morning, especially in his right leg  Thinks he overdid it c/ the backpack leaf blower yesterday  Objective: See treatment diary below      Assessment: Tolerated treatment well  Patient demonstrated fatigue post treatment, exhibited good technique with therapeutic exercises and would benefit from continued PT  Incorporated side-stepping this visit, which patient was able to complete c/ good technique following vc's to reduce toe out during performance  Appropriately challenged by exercises this visit  Plan: Continue per plan of care        Precautions: None      Date  3/28 3/31 4/3 4/5   Manuals                        Neuro Re-Ed        SLS        Sidestepping  nv  nv L2 5 laps   TB Supine Clams        S/Lying Clams        Bridge  nv 2/10 2/10 nv   EMILIA P4 P3 2x10 P3 2/10 P3 2/10 P3 2/10   Pallof Press    P1 3/10 P1 3/10           Ther Ex        LTRs  nv 20x ea HEP    Gastroc Stretch  nv 30\" 2 ea  HEP    Hamstring Stretch  30\"x2ea Step 30\"x2 step HEP    Heel Slides+ QS+SLR(3/28)  5\"x20ea 5\" x20 ea 5\"/20ea 5\"/20ea   LAQ  P2 3x10 P2 3/10 P2 3/10 P2 3/10   HC  P4 3x10 P5 3/10 P5 3/10 P5 3/10   TKE  L3 3x10ea L3 3/10 ea L5 3/10ea L5 3/10ea   Step Flexion        HR/TR  3x10 on 4\" step 3/10 4\" step 3/10 4\" step 3/10 4\" step   Lumbar Extension Machine  70# 2x10 70# 2/10 80# 2/10 80# 3/10   Lumbar Flexion Machine        Seated Flexion c/ SB: 3-way  x10ea x10 ea x10ea x10ea   Standing SLR 3 Way   10x ea np    Leg Press      " "  Piriformis Stretch        NuStep: LE Muscular Endurance & Knee ROM  L4 7' L4 10' L4 10' L4 10'           Ther Activity        Step-ups: Fwd  6\" 2x10ea 6\" 2/10 ea 6\" 2/10ea 8\" 2/10ea   Step-downs: Lat    2\" x15ea 2\" x15ea   Sit to Stands  2x10 no UE NV 2x10 no UE 2/10 no UE   Wall Squat        Obstacle Course                                Gait Training        Ambulation        Stairs                Modalities        CP                    Access Code: [email or web address]  URL: [email or web address]  Date: 03/23/2023  Prepared by: Marcello Rodriguez    Exercises  - Gastroc Stretch on Wall  - 1 x daily - 7 x weekly - 4 reps - 30 seconds hold  - Standing Hamstring Stretch with Step  - 1 x daily - 7 x weekly - 4 reps - 30 seconds hold  - Supine Heel Slide  - 1 x daily - 7 x weekly - 20 reps - 5 seconds hold  - Supine Lower Trunk Rotation  - 1 x daily - 7 x weekly - 20 reps                 "

## 2023-04-18 LAB
LEFT EYE DIABETIC RETINOPATHY: NORMAL
RIGHT EYE DIABETIC RETINOPATHY: NORMAL

## 2023-04-25 ENCOUNTER — OFFICE VISIT (OUTPATIENT)
Dept: PHYSICAL THERAPY | Facility: CLINIC | Age: 68
End: 2023-04-25

## 2023-04-25 DIAGNOSIS — M25.562 CHRONIC PAIN OF BOTH KNEES: ICD-10-CM

## 2023-04-25 DIAGNOSIS — M54.50 CHRONIC LOW BACK PAIN WITHOUT SCIATICA, UNSPECIFIED BACK PAIN LATERALITY: Primary | ICD-10-CM

## 2023-04-25 DIAGNOSIS — M25.561 CHRONIC PAIN OF BOTH KNEES: ICD-10-CM

## 2023-04-25 DIAGNOSIS — G89.29 CHRONIC PAIN OF BOTH KNEES: ICD-10-CM

## 2023-04-25 DIAGNOSIS — G89.29 CHRONIC LOW BACK PAIN WITHOUT SCIATICA, UNSPECIFIED BACK PAIN LATERALITY: Primary | ICD-10-CM

## 2023-04-25 NOTE — PROGRESS NOTES
"Daily Note     Today's date: 2023  Patient name: Gary Robert  : 1955  MRN: [id number]  Referring provider: Lourdes Collet, DO  Dx:   Encounter Diagnosis     ICD-10-CM    1  Chronic low back pain without sciatica, unspecified back pain laterality  M54 50     G89 29       2  Chronic pain of both knees  M25 561     M25 562     G89 29           Start Time: 1000  Stop Time: 1100  Total time in clinic (min): 60 minutes    Subjective: Reports knees and back were pretty good over the weekend  Right knee continues to be the worst c/ regards to pain and symptoms  Objective: See treatment diary below      Assessment: Tolerated treatment well  Patient demonstrated fatigue post treatment, exhibited good technique with therapeutic exercises and would benefit from continued PT  Responded well to manual knee flexion c/ tibiofemoral distraction and ecc HS resistance as evidenced by improved right knee flexion and improved gait mechanics following intervention  Minimal to no pain c/ lateral step-downs this visit  Plan: Continue per plan of care        Precautions: None      Date     Manuals        Knee FLX c/ TF Distraction  KS   KS c/ ecc HS   Piriformis Stretch  KS KS             Neuro Re-Ed        TA Activation & PPT Education and Performance  KS 5'      SLS  nv 30\"/3 ea 30\"/3ea nv   Sidestepping  nv nv nv nv   C/L UE/LE Iso    x20ea x20ea   PPT c/ March    x20ea    EMILIA  P4 2/10 nv P4 2/10 np   [employer]  np Focused on TA  DC            Ther Ex        SKTOC        LTRs        Heel Slides+ QS+SLR(3/28)  5\"/20ea nv  5\"/20ea 5\"/20ea   LAQ  P2 3/10 SL P1 3/10 SL P1 3/10 SL P1 3/10   HC  P6,7 3/10 SL P3 3/10 SL P4 3/10 SL P4 3/10   TKE  CC P5 2/10ea CC P5 3/10 CC P5 3/10 CC P5 3/10   HR/TR  HEP      Seated Flexion c/ SB: 3-way  HEP      Standing SLR 3 Way        Leg Press     nv   NuStep: LE Muscular Endurance & Knee ROM  L5 10' L5 13' L5 11' L5 11'           Ther Activity      " "  Step-ups: Fwd  8\" 2/10ea 8\" 2/10ea 8\" 2/10ea 8\" 2/10ea   Step-downs: Lat  2\" 2/10ea 4\" 2/10ea 2\" 2/10EA 2\" 2/10ea   Sit to [address] 2\" under LLE x10ea 2\" under LLE 2/15 no step 2/15 no step   Wall Squat c/ SB        Obstacle Course                                Gait Training        Ambulation        Stairs                Modalities        CP                    Access Code: [email or web address]  URL: [email or web address]  Date: 03/23/2023  Prepared by: Joelle Garcia    Exercises  - Gastroc Stretch on Wall  - 1 x daily - 7 x weekly - 4 reps - 30 seconds hold  - Standing Hamstring Stretch with Step  - 1 x daily - 7 x weekly - 4 reps - 30 seconds hold  - Supine Heel Slide  - 1 x daily - 7 x weekly - 20 reps - 5 seconds hold  - Supine Lower Trunk Rotation  - 1 x daily - 7 x weekly - 20 reps           "

## 2023-04-27 ENCOUNTER — OFFICE VISIT (OUTPATIENT)
Dept: PHYSICAL THERAPY | Facility: CLINIC | Age: 68
End: 2023-04-27

## 2023-04-27 DIAGNOSIS — M54.50 CHRONIC LOW BACK PAIN WITHOUT SCIATICA, UNSPECIFIED BACK PAIN LATERALITY: Primary | ICD-10-CM

## 2023-04-27 DIAGNOSIS — M25.562 CHRONIC PAIN OF BOTH KNEES: ICD-10-CM

## 2023-04-27 DIAGNOSIS — M25.561 CHRONIC PAIN OF BOTH KNEES: ICD-10-CM

## 2023-04-27 DIAGNOSIS — G89.29 CHRONIC LOW BACK PAIN WITHOUT SCIATICA, UNSPECIFIED BACK PAIN LATERALITY: Primary | ICD-10-CM

## 2023-04-27 DIAGNOSIS — G89.29 CHRONIC PAIN OF BOTH KNEES: ICD-10-CM

## 2023-04-27 NOTE — PROGRESS NOTES
"Daily Note     Today's date: 2023  Patient name: Juwan Ansari  : 1955  MRN: [id number]  Referring provider: Ha Paniagua DO  Dx:   Encounter Diagnosis     ICD-10-CM    1  Chronic low back pain without sciatica, unspecified back pain laterality  M54 50     G89 29       2  Chronic pain of both knees  M25 561     M25 562     G89 29                      Subjective: Patient reports that his B knees are \"feeling okay today  \"       Objective: See treatment diary below  Incorporated leg press today  Assessment: Tolerated treatment well  Patient demonstrated fatigue post treatment and would benefit from continued PT to improve B knee strength and stability   Tendon on lateral R knee has a \"snap\" when palpated during sit to stands  Improved with higher surface  IASTM to this area to improve mobility  Re-assessed during STS, slightly better  Plan to incorporate active release to quad and quad tendon with STS and stretching  Plan: Continue per plan of care        Precautions: None      Date    Manuals        Knee FLX c/ TF Distraction and ecc HS  CM KS   KS c/ ecc HS   Piriformis Stretch  KS KS     Active release/ IASTM Quad- CM       Neuro Re-Ed        TA Activation & PPT Education and Performance  KS 5'      SLS 30\"x3 ea nv 30\"/3 ea 30\"/3ea nv   Sidestepping  nv nv nv nv   C/L UE/LE Iso    x20ea x20ea   PPT / March    x20ea    EMILIA  P4 2/10 nv P4 2/10 np   Pallof Press  np Focused on TA  DC            Ther Ex        SKTOC        LTRs        Heel Slides+ QS+SLR(3/28)  5\"/20ea nv  5\"/20ea 5\"/20ea   LAQ SL P1 3/10 P2 3/10 SL P1 3/10 SL P1 3/10 SL P1 3/10   HC SL P4 3/10  P6,7 3/10 SL P3 3/10 SL P4 3/10 SL P4 3/10   TKE CC P5 3x10  CC P5 2/10ea CC P5 3/10 CC P5 3/10 CC P5 3/10   HR/TR  HEP      Seated Flexion c/ SB: 3-way  HEP      Standing SLR 3 Way        Leg Press S:9 P2 2x10    nv   NuStep: LE Muscular Endurance & Knee ROM L5 10' L5 10' L5 13' L5 11' L5 11'         " "  Ther Activity        Step-ups: Fwd 8\" 2x10 ea 8\" 2/10ea 8\" 2/10ea 8\" 2/10ea 8\" 2/10ea   Step-downs: Lat 2\" 2x10 ea 2\" 2/10ea 4\" 2/10ea 2\" 2/10EA 2\" 2/10ea   Sit to Stands 2/15 chair + airex  x10ea 2\" under LLE x10ea 2\" under LLE 2/15 no step 2/15 no step   Wall Squat c/ SB        Obstacle Course                                Gait Training        Ambulation        Stairs                Modalities        CP                    Access Code: [email or web address]  URL: [email or web address]  Date: 03/23/2023  Prepared by: Joel Vega    Exercises  - Gastroc Stretch on Wall  - 1 x daily - 7 x weekly - 4 reps - 30 seconds hold  - Standing Hamstring Stretch with Step  - 1 x daily - 7 x weekly - 4 reps - 30 seconds hold  - Supine Heel Slide  - 1 x daily - 7 x weekly - 20 reps - 5 seconds hold  - Supine Lower Trunk Rotation  - 1 x daily - 7 x weekly - 20 reps             "

## 2023-05-01 ENCOUNTER — OFFICE VISIT (OUTPATIENT)
Dept: PHYSICAL THERAPY | Facility: CLINIC | Age: 68
End: 2023-05-01

## 2023-05-01 DIAGNOSIS — M25.561 CHRONIC PAIN OF BOTH KNEES: ICD-10-CM

## 2023-05-01 DIAGNOSIS — M25.562 CHRONIC PAIN OF BOTH KNEES: ICD-10-CM

## 2023-05-01 DIAGNOSIS — G89.29 CHRONIC PAIN OF BOTH KNEES: ICD-10-CM

## 2023-05-01 DIAGNOSIS — G89.29 CHRONIC LOW BACK PAIN WITHOUT SCIATICA, UNSPECIFIED BACK PAIN LATERALITY: Primary | ICD-10-CM

## 2023-05-01 DIAGNOSIS — M54.50 CHRONIC LOW BACK PAIN WITHOUT SCIATICA, UNSPECIFIED BACK PAIN LATERALITY: Primary | ICD-10-CM

## 2023-05-01 NOTE — PROGRESS NOTES
"Daily Note     Today's date: 2023  Patient name: Greg Cordero  : 1955  MRN: [id number]  Referring provider: Stas Merrill DO  Dx:   Encounter Diagnosis     ICD-10-CM    1  Chronic low back pain without sciatica, unspecified back pain laterality  M54 50     G89 29       2  Chronic pain of both knees  M25 561     M25 562     G89 29                      Subjective: Patient reports that his R knee felt pretty good after last session  He reported that today was the best he's felt performing the STS exercises  Objective: See treatment diary below  IASTM to R quad and tendons  Assessment: Tolerated treatment well  Patient exhibited good technique with therapeutic exercises and would benefit from continued PT to improve B knee strength and stability for function and reduce pain  R knee has crepitus and tendon tightness with STS  Re-assessed after IASTM to R quad and tendon and had noticeable improvement  Plan: Continue per plan of care        Precautions: None      Date    Manuals        Knee FLX c/ TF Distraction and ecc HS  CM CM   KS c/ ecc HS   Piriformis Stretch   KS     Active release/ IASTM Quad- CM Quad- CM      Neuro Re-Ed        TA Activation & PPT Education and Performance        SLS 30\"x3 ea 30\"X3 ea 30\"/3 ea 30\"/3ea nv   Sidestepping   nv nv nv   C/L UE/LE Iso    x20ea x20ea   PPT / March    x20ea    EMILIA   nv P4 2/10 np   Pallof Press    DC            Ther Ex        SKTOC        LTRs        Heel Slides+ QS+SLR(3/28)   nv  5\"/20ea 5\"/20ea   LAQ SL P1 3/10 SL P1 3x10 SL P1 3/10 SL P1 3/10 SL P1 3/10   HC SL P4 3/10  SL P4 3x10 SL P3 3/10 SL P4 3/10 SL P4 3/10   TKE CC P5 3x10  CC P5 3x10 CC P5 3/10 CC P5 3/10 CC P5 3/10   HR/TR        Seated Flexion c/ SB: 3-way        Standing SLR 3 Way        Leg Press S:9 P2 2x10 P2 2x10   nv   NuStep: LE Muscular Endurance & Knee ROM L5 10' L5 10' L5 13' L5 11' L5 11'           Ther Activity        Step-ups: Fwd " "8\" 2x10 ea 8\" 2x10 8\" 2/10ea 8\" 2/10ea 8\" 2/10ea   Step-downs: Lat 2\" 2x10 ea 4\" 2x10 4\" 2/10ea 2\" 2/10EA 2\" 2/10ea   Sit to Stands 2/15 chair + airex  2/15 chair + airex  x10ea 2\" under LLE 2/15 no step 2/15 no step   Wall Squat c/ SB        Obstacle Course                                Gait Training        Ambulation        Stairs                Modalities        CP                      "

## 2023-05-03 ENCOUNTER — OFFICE VISIT (OUTPATIENT)
Dept: PHYSICAL THERAPY | Facility: CLINIC | Age: 68
End: 2023-05-03

## 2023-05-03 DIAGNOSIS — G89.29 CHRONIC PAIN OF BOTH KNEES: ICD-10-CM

## 2023-05-03 DIAGNOSIS — M25.561 CHRONIC PAIN OF BOTH KNEES: ICD-10-CM

## 2023-05-03 DIAGNOSIS — G89.29 CHRONIC LOW BACK PAIN WITHOUT SCIATICA, UNSPECIFIED BACK PAIN LATERALITY: Primary | ICD-10-CM

## 2023-05-03 DIAGNOSIS — M54.50 CHRONIC LOW BACK PAIN WITHOUT SCIATICA, UNSPECIFIED BACK PAIN LATERALITY: Primary | ICD-10-CM

## 2023-05-03 DIAGNOSIS — M25.562 CHRONIC PAIN OF BOTH KNEES: ICD-10-CM

## 2023-05-03 NOTE — PROGRESS NOTES
"Daily Note     Today's date: 5/3/2023  Patient name: Padma Man  : 1955  MRN: [id number]  Referring provider: Susan Koroma DO  Dx:   Encounter Diagnosis     ICD-10-CM    1  Chronic low back pain without sciatica, unspecified back pain laterality  M54 50     G89 29       2  Chronic pain of both knees  M25 561     M25 562     G89 29                      Subjective: Pt states he feels thing are getting better  Pt states he is really happy with how his knee has been feeling  Objective: See treatment diary below  Assessment: Tolerated treatment well as manual PT  Pt  continues to help his right knee  Pt demonstrates  Improved STS form with less valgus/verus instability  Pt was able to progress to uneven surfaces for SLS today  Patient would benefit from continued PT to improve B knee strength and stability for function and reduce pain  Plan: Continue per plan of care        Precautions: None      Date 4/27 5/1 5/3   4/25   Manuals        Knee FLX c/ TF Distraction and ecc HS  CM CM CF  KS c/ ecc HS   Piriformis Stretch        Active release/ IASTM Quad- CM Quad- CM Quad, CF     Neuro Re-Ed        TA Activation & PPT Education and Performance        SLS 30\"x3 ea 30\"X3 ea Foam 30\" x 3   nv   Sidestepping     nv   C/L UE/LE Iso     x20ea   PPT / March        EMILIA     np   Pallof Press                Ther Ex        SKTOC        LTRs        Heel Slides+ QS+SLR(3/28)     5\"/20ea   LAQ SL P1 3/10 SL P1 3x10 SL P1 3x10  SL P1 3/10   HC SL P4 3/10  SL P4 3x10 SL P4 3x10  SL P4 3/10   TKE CC P5 3x10  CC P5 3x10 CC P5 3x10  CC P5 3/10   HR/TR        Seated Flexion c/ SB: 3-way        Standing SLR 3 Way        Leg Press S:9 P2 2x10 P2 2x10 P2 2 x 10   nv   NuStep: LE Muscular Endurance & Knee ROM L5 10' L5 10' L5 10'  L5 11'           Ther Activity        Step-ups: Fwd 8\" 2x10 ea 8\" 2x10 8\" 2 x 10   8\" 10ea   Step-downs: Lat 2\" 2x10 ea 4\" 2x10 4\" 2 x 10   2\" 2/10ea   Sit to [facility] 2/15 chair " + airex  2/15 chair + airex  2x 15   2/15 no step   Wall Squat c/ SB        Obstacle Course                                Gait Training        Ambulation        Stairs                Modalities        CP

## 2023-05-08 DIAGNOSIS — E66.09 CLASS 2 OBESITY DUE TO EXCESS CALORIES IN ADULT, UNSPECIFIED BMI, UNSPECIFIED WHETHER SERIOUS COMORBIDITY PRESENT: ICD-10-CM

## 2023-05-08 DIAGNOSIS — I35.8 AORTIC VALVE SCLEROSIS: ICD-10-CM

## 2023-05-08 DIAGNOSIS — I10 ESSENTIAL HYPERTENSION: ICD-10-CM

## 2023-05-08 DIAGNOSIS — I10 BENIGN ESSENTIAL HYPERTENSION: ICD-10-CM

## 2023-05-08 DIAGNOSIS — E78.2 MIXED HYPERLIPIDEMIA: ICD-10-CM

## 2023-05-08 DIAGNOSIS — E11.9 TYPE 2 DIABETES MELLITUS WITHOUT COMPLICATION, WITHOUT LONG-TERM CURRENT USE OF INSULIN (HCC): ICD-10-CM

## 2023-05-08 RX ORDER — PANTOPRAZOLE SODIUM 40 MG/1
TABLET, DELAYED RELEASE ORAL
Qty: 90 TABLET | Refills: 3 | Status: SHIPPED | OUTPATIENT
Start: 2023-05-08

## 2023-05-08 RX ORDER — HYDROCHLOROTHIAZIDE 25 MG/1
TABLET ORAL
Qty: 180 TABLET | Refills: 3 | Status: SHIPPED | OUTPATIENT
Start: 2023-05-08

## 2023-05-08 RX ORDER — METFORMIN HYDROCHLORIDE 500 MG/1
TABLET, EXTENDED RELEASE ORAL
Qty: 180 TABLET | Refills: 3 | Status: SHIPPED | OUTPATIENT
Start: 2023-05-08

## 2023-05-08 RX ORDER — LOSARTAN POTASSIUM 50 MG/1
TABLET ORAL
Qty: 90 TABLET | Refills: 3 | Status: SHIPPED | OUTPATIENT
Start: 2023-05-08

## 2023-05-08 RX ORDER — ALLOPURINOL 300 MG/1
TABLET ORAL
Qty: 90 TABLET | Refills: 3 | Status: SHIPPED | OUTPATIENT
Start: 2023-05-08

## 2023-05-09 ENCOUNTER — OFFICE VISIT (OUTPATIENT)
Dept: PHYSICAL THERAPY | Facility: CLINIC | Age: 68
End: 2023-05-09

## 2023-05-09 DIAGNOSIS — G89.29 CHRONIC LOW BACK PAIN WITHOUT SCIATICA, UNSPECIFIED BACK PAIN LATERALITY: Primary | ICD-10-CM

## 2023-05-09 DIAGNOSIS — M54.50 CHRONIC LOW BACK PAIN WITHOUT SCIATICA, UNSPECIFIED BACK PAIN LATERALITY: Primary | ICD-10-CM

## 2023-05-09 DIAGNOSIS — G89.29 CHRONIC PAIN OF BOTH KNEES: ICD-10-CM

## 2023-05-09 DIAGNOSIS — M25.562 CHRONIC PAIN OF BOTH KNEES: ICD-10-CM

## 2023-05-09 DIAGNOSIS — M25.561 CHRONIC PAIN OF BOTH KNEES: ICD-10-CM

## 2023-05-09 NOTE — PROGRESS NOTES
"Daily Note     Today's date: 2023  Patient name: Fred Parks  : 1955  MRN: [id number]  Referring provider: Chong Wells DO  Dx:   Encounter Diagnosis     ICD-10-CM    1  Chronic low back pain without sciatica, unspecified back pain laterality  M54 50     G89 29       2  Chronic pain of both knees  M25 561     M25 562     G89 29                      Subjective: Patient noted no back pain today, pain in lulu knees  Objective: See treatment diary below      Assessment: Tolerated treatment fair  Patient noted that when IASTM was performed prior to sit to stands, patient noted \"it went better  \" Patient exhibited good technique with therapeutic exercises and would benefit from continued PT      Plan: Continue per plan of care        Precautions: None      Date 4/27 5/1 5/3  5/9     Manuals        Knee FLX c/ TF Distraction and ecc HS  CM CM CF AC    Piriformis Stretch        Active release/ IASTM Quad- CM Quad- CM Quad, CF Quad AC    Neuro Re-Ed        TA Activation & PPT Education and Performance        SLS 30\"x3 ea 30\"X3 ea Foam 30\" x 3      Sidestepping        C/L UE/LE Iso        PPT / March        EMILIA        Pallof Press                Ther Ex        SKTOC        LTRs        Heel Slides+ QS+SLR(3/28)        LAQ SL P1 3/10 SL P1 3x10 SL P1 3x10 SL P1 3x10    HC SL P4 3/10  SL P4 3x10 SL P4 3x10 SL P4 3x10    TKE CC P5 3x10  CC P5 3x10 CC P5 3x10 CC P5 3x10    HR/TR        Seated Flexion c/ SB: 3-way        Standing SLR 3 Way        Leg Press S:9 P2 2x10 P2 2x10 P2 2 x 10  P2 2 x 10     NuStep: LE Muscular Endurance & Knee ROM L5 10' L5 10' L5 10' L5 10'            Ther Activity        Step-ups: Fwd 8\" 2x10 ea 8\" 2x10 8\" 2 x 10  8\"   2 x 10    Step-downs: Lat 2\" 2x10 ea 4\" 2x10 4\" 2 x 10  4\"   2 x 10    Sit to [facility] 2/15 chair + airex  2/15 chair + airex  2x 15  2 x 15    Wall Squat c/ SB        Obstacle Course                                Gait Training        Ambulation        Stairs   " Modalities        CP                  Patient supervised by PTA Parminder MONACO from 9:00am -9:15am    Treated by PTA AC 9:15 am-9:58am

## 2023-05-11 ENCOUNTER — OFFICE VISIT (OUTPATIENT)
Dept: PHYSICAL THERAPY | Facility: CLINIC | Age: 68
End: 2023-05-11

## 2023-05-11 DIAGNOSIS — M25.562 CHRONIC PAIN OF BOTH KNEES: ICD-10-CM

## 2023-05-11 DIAGNOSIS — M25.561 CHRONIC PAIN OF BOTH KNEES: ICD-10-CM

## 2023-05-11 DIAGNOSIS — M54.50 CHRONIC LOW BACK PAIN WITHOUT SCIATICA, UNSPECIFIED BACK PAIN LATERALITY: Primary | ICD-10-CM

## 2023-05-11 DIAGNOSIS — G89.29 CHRONIC PAIN OF BOTH KNEES: ICD-10-CM

## 2023-05-11 DIAGNOSIS — G89.29 CHRONIC LOW BACK PAIN WITHOUT SCIATICA, UNSPECIFIED BACK PAIN LATERALITY: Primary | ICD-10-CM

## 2023-05-11 NOTE — PROGRESS NOTES
"Daily Note     Today's date: 2023  Patient name: Camron Crockett  : 1955  MRN: [id number]  Referring provider: Kristen Whitaker DO  Dx:   Encounter Diagnosis     ICD-10-CM    1  Chronic low back pain without sciatica, unspecified back pain laterality  M54 50     G89 29       2  Chronic pain of both knees  M25 561     M25 562     G89 29           Start Time: 0830  Stop Time: 09  Total time in clinic (min): 65 minutes    Subjective: Patient reports he feels his strength c/ stair climbing has improved  Still mainly has pain in right knee just proximal to patella on lateral side  Objective: See treatment diary below      Assessment: Tolerated treatment well  Patient demonstrated fatigue post treatment, exhibited good technique with therapeutic exercises and would benefit from continued PT  Discussed continued regular HEP of stretching at home, will discuss and review home modifications for exercises at next visit to prepare for transition to HEP in next 1-2 weeks  Decreased pain c/ sit to stands in right knee following manual IASTM to right ITB and distal quad  Plan: Continue per plan of care        Precautions: None      Date 4/27 5/1 5/3  5/9  5/11   Manuals        Knee FLX c/ TF Distraction and ecc HS  CM CM CF AC KS   Piriformis Stretch        Active release/ IASTM Quad- CM Quad- CM Quad, CF Quad AC Quad KS   Neuro Re-Ed        TA Activation & PPT Education and Performance        SLS 30\"x3 ea 30\"X3 ea Foam 30\" x 3   Foam 30\"/3   Sidestepping        C/L UE/LE Iso        PPT / March        EMILIA        Pallof Press                Ther Ex        SKTOC        LTRs        Heel Slides+ QS+SLR(3/28)        LAQ SL P1 3/10 SL P1 3x10 SL P1 3x10 SL P1 3x10 SL P1 3/10   HC SL P4 3/10  SL P4 3x10 SL P4 3x10 SL P4 3x10 SL P4 3/10   TKE CC P5 3x10  CC P5 3x10 CC P5 3x10 CC P5 3x10 CC P5 3/10   HR/TR        Seated Flexion c/ SB: 3-way        Standing SLR 3 Way        Leg Press S:9 P2 2x10 P2 2x10 P2 2 " "x 10  P2 2 x 10  P2 2/10   NuStep: LE Muscular Endurance & Knee ROM L5 10' L5 10' L5 10' L5 10' L5 10'           Ther Activity        Step-ups: Fwd 8\" 2x10 ea 8\" 2x10 8\" 2 x 10  8\"   2 x 10 8\" 2/10   Step-downs: Lat 2\" 2x10 ea 4\" 2x10 4\" 2 x 10  4\"   2 x 10 4\" 2/10   Sit to [facility] 2/15 chair + airex  2/15 chair + airex  2x 15  2 x 15 After STM 2/15   Wall Squat c/ SB        Obstacle Course                                Gait Training        Ambulation        Stairs                Modalities        CP                           "

## 2023-05-16 ENCOUNTER — APPOINTMENT (OUTPATIENT)
Dept: PHYSICAL THERAPY | Facility: CLINIC | Age: 68
End: 2023-05-16
Payer: MEDICARE

## 2023-05-24 ENCOUNTER — OFFICE VISIT (OUTPATIENT)
Dept: FAMILY MEDICINE CLINIC | Facility: CLINIC | Age: 68
End: 2023-05-24

## 2023-05-24 VITALS
DIASTOLIC BLOOD PRESSURE: 72 MMHG | WEIGHT: 224.6 LBS | HEIGHT: 70 IN | RESPIRATION RATE: 18 BRPM | SYSTOLIC BLOOD PRESSURE: 126 MMHG | TEMPERATURE: 97.4 F | HEART RATE: 78 BPM | BODY MASS INDEX: 32.16 KG/M2

## 2023-05-24 DIAGNOSIS — E11.22 TYPE 2 DIABETES MELLITUS WITH CHRONIC KIDNEY DISEASE, WITH LONG-TERM CURRENT USE OF INSULIN, UNSPECIFIED CKD STAGE (HCC): ICD-10-CM

## 2023-05-24 DIAGNOSIS — Z79.4 TYPE 2 DIABETES MELLITUS WITH CHRONIC KIDNEY DISEASE, WITH LONG-TERM CURRENT USE OF INSULIN, UNSPECIFIED CKD STAGE (HCC): ICD-10-CM

## 2023-05-24 DIAGNOSIS — E11.9 TYPE 2 DIABETES MELLITUS WITHOUT COMPLICATION, WITHOUT LONG-TERM CURRENT USE OF INSULIN (HCC): Primary | ICD-10-CM

## 2023-05-24 DIAGNOSIS — I35.8 AORTIC VALVE SCLEROSIS: ICD-10-CM

## 2023-05-24 DIAGNOSIS — I10 BENIGN ESSENTIAL HYPERTENSION: ICD-10-CM

## 2023-05-24 DIAGNOSIS — B35.1 ONYCHOMYCOSIS: ICD-10-CM

## 2023-05-24 RX ORDER — IBUPROFEN 600 MG/1
TABLET ORAL
COMMUNITY
Start: 2023-03-27

## 2023-05-24 NOTE — PROGRESS NOTES
Name: Ross Veliz      : 1955      MRN: [id number]  Encounter Provider: Hernando Elliott DO  Encounter Date: 2023   Encounter department: [address]     1  Type 2 diabetes mellitus without complication, without long-term current use of insulin (HCC)  -     Basic metabolic panel; Future  -     Albumin / creatinine urine ratio; Future  -     Ambulatory Referral to Podiatry; Future  Rx for fbw for  routine podiatry visit for nailcare/diabetic followup    2  Type 2 diabetes mellitus with chronic kidney disease, with long-term current use of insulin, unspecified CKD stage ([facility] [address] )  Recheck CMP Pt has improved water intake     3  Benign essential hypertension  Stable continue current rx     4  Aortic valve sclerosis  -     Echo complete w/ contrast if indicated; Future; Expected date: 2023  Recheck echo - last done      5  Onychomycosis  -     Ambulatory Referral to Podiatry; Future        Depression Screening and Follow-up Plan: Patient was screened for depression during today's encounter  They screened negative with a PHQ-2 score of 0       3months/prn  Subjective      HPI   Pt feels generally well Has been enjoying baseball games and outdoor activities as the weather improves No chest pain, sob or syncope No acute issues He has been drinking more water ingeneral   Review of Systems   Constitutional: Negative for chills and fever  HENT: Negative  Eyes: Negative for visual disturbance  Respiratory: Negative for cough and shortness of breath  Cardiovascular: Negative for chest pain, palpitations and leg swelling  Gastrointestinal: Negative for abdominal distention and abdominal pain  Genitourinary: Negative  Musculoskeletal: Negative  Neurological: Negative for dizziness, light-headedness and headaches  Psychiatric/Behavioral: Negative for sleep disturbance  The patient is not nervous/anxious          Current Outpatient "Medications on File Prior to Visit   Medication Sig   • allopurinol (ZYLOPRIM) 300 mg tablet TAKE 1 TABLET DAILY   • atorvastatin (LIPITOR) 80 mg tablet TAKE 1 TABLET DAILY   • EPINEPHrine (EPIPEN) 0 3 mg/0 3 mL SOAJ Inject 0 3mg/0 3ml as needed for allergic rxn   • hydrochlorothiazide (HYDRODIURIL) 25 mg tablet TAKE 2 TABLETS DAILY   • ibuprofen (MOTRIN) 600 mg tablet    • losartan (COZAAR) 50 mg tablet TAKE 1 TABLET DAILY   • metFORMIN (GLUCOPHAGE-XR) 500 mg 24 hr tablet TAKE 1 TABLET TWICE DAILY  WITH MEALS   • pantoprazole (PROTONIX) 40 mg tablet TAKE 1 TABLET DAILY   • pramipexole (MIRAPEX) 0 125 mg tablet Take 1 tablet (0 125 mg total) by mouth daily at bedtime   • VITAMIN D PO Take 2,000 Int'l Units by mouth daily   • predniSONE 10 mg tablet Take as directed with food thru taper (Patient not taking: Reported on 5/24/2023)       Objective     /72   Pulse 78   Temp (!) 97 4 °F (36 3 °C) (Temporal)   Resp 18   Ht 5' 10\" (1 778 m)   Wt 102 kg (224 lb 9 6 oz)   BMI 32 23 kg/m²   Diabetic Foot Exam    Patient's shoes and socks removed  Right Foot/Ankle   Right Foot Inspection  Skin Exam: skin normal, skin intact and dry skin  No warmth, no callus, no erythema, no maceration, no abnormal color, no pre-ulcer, no ulcer and no callus  Toe Exam: ROM and strength within normal limits  Sensory   Vibration: intact  Monofilament testing: intact    Vascular  The right DP pulse is 2+  The right PT pulse is 2+  Left Foot/Ankle  Left Foot Inspection  Skin Exam: skin normal, skin intact and dry skin  No warmth, no erythema, no maceration, normal color, no pre-ulcer, no ulcer and no callus  Toe Exam: ROM and strength within normal limits  Sensory   Vibration: intact  Monofilament testing: intact    Vascular  The left DP pulse is 2+  The left PT pulse is 2+  Assign Risk Category  No deformity present  No loss of protective sensation  No weak pulses  Risk: 0    Physical Exam  Vitals reviewed   " Constitutional:       General: He is not in acute distress  Appearance: Normal appearance  He is not ill-appearing, toxic-appearing or diaphoretic  HENT:      Head: Normocephalic and atraumatic  Right Ear: External ear normal       Left Ear: External ear normal       Nose: Nose normal       Mouth/Throat:      Mouth: Mucous membranes are moist    Eyes:      General: No scleral icterus  Extraocular Movements: Extraocular movements intact  Conjunctiva/sclera: Conjunctivae normal       Pupils: Pupils are equal, round, and reactive to light  Cardiovascular:      Rate and Rhythm: Normal rate and regular rhythm  Pulses: Normal pulses  no weak pulses          Dorsalis pedis pulses are 2+ on the right side and 2+ on the left side  Posterior tibial pulses are 2+ on the right side and 2+ on the left side  Pulmonary:      Effort: Pulmonary effort is normal  No respiratory distress  Breath sounds: Normal breath sounds  No wheezing  Abdominal:      General: Bowel sounds are normal  There is no distension  Palpations: Abdomen is soft  Tenderness: There is no abdominal tenderness  Musculoskeletal:         General: Normal range of motion  Cervical back: Normal range of motion and neck supple  No rigidity  Right lower leg: No edema  Feet:      Right foot:      Skin integrity: Dry skin present  No ulcer, skin breakdown, erythema, warmth or callus  Left foot:      Skin integrity: Dry skin present  No ulcer, skin breakdown, erythema, warmth or callus  Lymphadenopathy:      Cervical: No cervical adenopathy  Skin:     General: Skin is warm and dry  Coloration: Skin is not jaundiced or pale  Neurological:      General: No focal deficit present  Mental Status: He is alert and oriented to person, place, and time  Mental status is at baseline     Psychiatric:         Mood and Affect: Mood normal          Behavior: Behavior normal          Thought Content: Thought content normal          Judgment: Judgment normal      onchomycosis great toe   Electa Jeanine, DO

## 2023-07-05 ENCOUNTER — HOSPITAL ENCOUNTER (OUTPATIENT)
Dept: NON INVASIVE DIAGNOSTICS | Facility: HOSPITAL | Age: 68
Discharge: HOME | End: 2023-07-05
Attending: INTERNAL MEDICINE
Payer: MEDICARE

## 2023-07-05 VITALS
DIASTOLIC BLOOD PRESSURE: 62 MMHG | BODY MASS INDEX: 32.19 KG/M2 | HEIGHT: 70 IN | SYSTOLIC BLOOD PRESSURE: 124 MMHG | WEIGHT: 224.87 LBS | HEART RATE: 66 BPM

## 2023-07-05 DIAGNOSIS — I35.8 AORTIC VALVE SCLEROSIS: ICD-10-CM

## 2023-07-05 LAB
AORTIC ROOT: 3.3 CM
ASCENDING AORTA: 3.9 CM
AV PEAK GRADIENT: 12 MMHG
E WAVE DECELERATION TIME: 312 MS
INTERVENTRICULAR SEPTUM IN DIASTOLE (PARASTERNAL SHORT AXIS VIEW): 1.1 CM
INTERVENTRICULAR SEPTUM: 1.1 CM (ref 0.6–1.1)
LAAS-AP2: 26.8 CM2
LAAS-AP4: 27.4 CM2
LEFT ATRIUM SIZE: 4.8 CM
LEFT ATRIUM VOLUME (MOD BIPLANE): 102 ML
LEFT VENTRICULAR INTERNAL DIMENSION IN DIASTOLE: 4.8 CM (ref 3.5–6)
LEFT VENTRICULAR POSTERIOR WALL IN END DIASTOLE: 1.1 CM
MV E'TISSUE VEL-LAT: 10 CM/S
MV E'TISSUE VEL-SEP: 7 CM/S
MV PEAK A VEL: 0.84 M/S
MV PEAK E VEL: 84 CM/S
MV STENOSIS PRESSURE HALF TIME: 90 MS
MV VALVE AREA P 1/2 METHOD: 2.44 CM2
RA PRESSURE ESTIMATED: 5 MMHG
RIGHT ATRIUM AREA SYSTOLE A4C: 16.2 CM2
RIGHT VENTRICLE ID DIMENSION: 2.8 CM
RV PSP: 9 MMHG
SL CV LEFT ATRIUM LENGTH A2C: 5.9 CM
SL CV LV EF: 65
SL CV PED ECHO LEFT VENTRICLE DIASTOLIC VOLUME (MOD BIPLANE) 2D: 107 ML
TR MAX PG: 4 MMHG
TR PEAK VELOCITY: 1 M/S
TRICUSPID ANNULAR PLANE SYSTOLIC EXCURSION: 2.4 CM
TRICUSPID VALVE PEAK E WAVE VELOCITY: 0.12 M/S
TRICUSPID VALVE PEAK REGURGITATION VELOCITY: 0.96 M/S

## 2023-07-05 PROCEDURE — 93306 TTE W/DOPPLER COMPLETE: CPT | Performed by: INTERNAL MEDICINE

## 2023-07-05 PROCEDURE — 93306 TTE W/DOPPLER COMPLETE: CPT

## 2023-07-31 ENCOUNTER — OFFICE VISIT (OUTPATIENT)
Dept: PODIATRY | Facility: CLINIC | Age: 68
End: 2023-07-31
Payer: MEDICARE

## 2023-07-31 VITALS
BODY MASS INDEX: 32.56 KG/M2 | HEIGHT: 70 IN | SYSTOLIC BLOOD PRESSURE: 145 MMHG | WEIGHT: 227.4 LBS | HEART RATE: 111 BPM | DIASTOLIC BLOOD PRESSURE: 76 MMHG

## 2023-07-31 DIAGNOSIS — B35.3 TINEA PEDIS OF BOTH FEET: Primary | ICD-10-CM

## 2023-07-31 DIAGNOSIS — B35.1 ONYCHOMYCOSIS: ICD-10-CM

## 2023-07-31 DIAGNOSIS — E11.9 TYPE 2 DIABETES MELLITUS WITHOUT COMPLICATION, WITHOUT LONG-TERM CURRENT USE OF INSULIN (HCC): ICD-10-CM

## 2023-07-31 PROCEDURE — 99203 OFFICE O/P NEW LOW 30 MIN: CPT | Performed by: PODIATRIST

## 2023-07-31 RX ORDER — KETOCONAZOLE 20 MG/G
CREAM TOPICAL DAILY
Qty: 60 G | Refills: 2 | Status: SHIPPED | OUTPATIENT
Start: 2023-07-31

## 2023-07-31 NOTE — PROGRESS NOTES
Diabetic Foot Exam    Patient's shoes and socks removed. Right Foot/Ankle   Right Foot Inspection  Skin Exam: skin normal and skin intact. No dry skin, no warmth, no callus, no erythema, no maceration, no abnormal color, no pre-ulcer, no ulcer and no callus. Sensory   Vibration: intact  Proprioception: intact  Monofilament testing: intact    Vascular  Capillary refills: < 3 seconds  The right DP pulse is 1+. The right PT pulse is 1+. Left Foot/Ankle  Left Foot Inspection  Skin Exam: skin normal and skin intact. No dry skin, no warmth, no erythema, no maceration, normal color, no pre-ulcer, no ulcer and no callus. Sensory   Vibration: intact  Proprioception: intact  Monofilament testing: intact    Vascular  Capillary refills: < 3 seconds  The left DP pulse is 1+. The left PT pulse is 1+. PATIENT:  Yesi Lofton    1955    ASSESSMENT:     1. Type 2 diabetes mellitus without complication, without long-term current use of insulin ([address])  Ambulatory Referral to Podiatry      2. Onychomycosis  Ambulatory Referral to Podiatry            PLAN:  1. Patient was counseled on the condition and diagnosis. 2.  Educated disease prevention and risks related to diabetes. 3.  Educated proper daily foot care and exam.  Instructed proper skin care / protection and footwear. Instructed to identify any signs of infection and related foot problem. 4.  The recent blood work was reviewed / discussed and the last HbA1c was 5.8. Discussed proper blood glucose control with diet and exercise. 5.  The patient will return in 12 months for diabetic foot exam.      Imaging: I have personally reviewed pertinent films in PACS  Labs, pathology, and Other Studies: I have personally reviewed pertinent reports.       • High risk  foot precautions reviewed with patient including the need to wear protective shoegear at all times when walking including in the home, the need to check feet all surfaces daily with a mirror and report and skin breaks to podiatry, the need to apply an emollient to skin of feet daily. • Diabetic Foot Ulcer Risks    - Between 10-15% of diabetic foot ulcers do not heal.[v]  - Of diabetic foot ulcers that do not heal, 25% will require amputation. [v]    iv Derrell Blackmon., LEANDRO Scales, Carlos Reyes., and Guera PACHECO, Foot Ulcers in the Diabetic Patient, Prevention and Treatment. Vascular Health Risk Management. 2007 Feb; 3(1): 65-76  v OTILIO SANCHEZ, MADDY Barclay, TARA Martínez, MEGHAN France., Kira, TRainT., Risk factors for lower extremity amputation in patients with diabetic foot ulcers: a hospital-based case-control study. Diabetic Foot & Ankle, 2015. 6:10.3402    Rx given for Penlac and also ketoconazole, advised on use    Risk category 0    Subjective:          HPI  The patient presents for diabetic foot evaluation. The patient has diabetes for 5 years. The blood glucose is under control and the last HbA1c was 5.8. The patient denied any history of diabetic foot ulcer, related foot infection, or amputation. No significant numbness or paresthesia. Denied weakness or significant functional deficit. The patient presents with discolored toenails and also dry cracked scaly skin bilateral feet. The following portions of the patient's history were reviewed and updated as appropriate: allergies, current medications, past family history, past medical history, past social history, past surgical history and problem list.  All pertinent labs and images were reviewed.     Past Medical History  Past Medical History:   Diagnosis Date   • Arthritis    • Diabetes mellitus ([address])    • Difficulty walking    • DM (diabetes mellitus) ([address])    • GERD (gastroesophageal reflux disease)    • Gout    • Hyperlipidemia    • Hypertension    • Osteoarthritis    • Sleep apnea        Past Surgical History  Past Surgical History:   Procedure Laterality Date   • HERNIA REPAIR     • LAMINECTOMY     • TONSILLECTOMY Allergies:  Nuts - food allergy    Medications:  Current Outpatient Medications   Medication Sig Dispense Refill   • allopurinol (ZYLOPRIM) 300 mg tablet TAKE 1 TABLET DAILY 90 tablet 3   • atorvastatin (LIPITOR) 80 mg tablet TAKE 1 TABLET DAILY 90 tablet 3   • EPINEPHrine (EPIPEN) 0.3 mg/0.3 mL SOAJ Inject 0.3mg/0.3ml as needed for allergic rxn     • hydrochlorothiazide (HYDRODIURIL) 25 mg tablet TAKE 2 TABLETS DAILY 180 tablet 3   • ibuprofen (MOTRIN) 600 mg tablet      • losartan (COZAAR) 50 mg tablet TAKE 1 TABLET DAILY 90 tablet 3   • metFORMIN (GLUCOPHAGE-XR) 500 mg 24 hr tablet TAKE 1 TABLET TWICE DAILY  WITH MEALS 180 tablet 3   • pantoprazole (PROTONIX) 40 mg tablet TAKE 1 TABLET DAILY 90 tablet 3   • pramipexole (MIRAPEX) 0.125 mg tablet Take 1 tablet (0.125 mg total) by mouth daily at bedtime 30 tablet 5   • VITAMIN D PO Take 2,000 Int'l Units by mouth daily     • predniSONE 10 mg tablet Take as directed with food thru taper (Patient not taking: Reported on 2023) 40 tablet 0     No current facility-administered medications for this visit. Social History:  Social History     Socioeconomic History   • Marital status: /Civil Union     Spouse name: None   • Number of children: None   • Years of education: None   • Highest education level: None   Occupational History   • None   Tobacco Use   • Smoking status: Former     Packs/day: 0.50     Years: 10.00     Total pack years: 5.00     Types: Cigarettes     Start date: 1975     Quit date: 1985     Years since quittin.6   • Smokeless tobacco: Never   Vaping Use   • Vaping Use: Never used   Substance and Sexual Activity   • Alcohol use:  Yes     Alcohol/week: 3.0 standard drinks of alcohol     Types: 3 Shots of liquor per week   • Drug use: Never   • Sexual activity: None   Other Topics Concern   • None   Social History Narrative   • None     Social Determinants of Health     Financial Resource Strain: Low Risk  (2022) Overall Financial Resource Strain (CARDIA)    • Difficulty of Paying Living Expenses: Not hard at all   Food Insecurity: Not on file   Transportation Needs: No Transportation Needs (8/29/2022)    PRAPARE - Transportation    • Lack of Transportation (Medical): No    • Lack of Transportation (Non-Medical): No   Physical Activity: Not on file   Stress: Not on file   Social Connections: Not on file   Intimate Partner Violence: Not on file   Housing Stability: Not on file        Review of Systems   Constitutional: Negative for chills and fever. HENT: Negative for ear pain and sore throat. Eyes: Negative for pain and visual disturbance. Respiratory: Negative for cough and shortness of breath. Cardiovascular: Negative for chest pain and palpitations. Gastrointestinal: Negative for abdominal pain and vomiting. Genitourinary: Negative for dysuria and hematuria. Musculoskeletal: Negative for arthralgias and back pain. Skin: Negative for color change and rash. Neurological: Negative for seizures and syncope. All other systems reviewed and are negative. Objective:      Ht 5' 10" (1.778 m)   BMI 32.27 kg/m²          Physical Exam  Vitals reviewed. Constitutional:       Appearance: Normal appearance. He is obese. Cardiovascular:      Pulses:           Dorsalis pedis pulses are 1+ on the right side and 1+ on the left side. Posterior tibial pulses are 1+ on the right side and 1+ on the left side. Musculoskeletal:      Comments: Trophic findings. Minimal nail thickening but they are slightly thickened, discolored with some subungual debris's especially bilateral hallux nails    Dry cracked scaly skin moccasin type distribution bilateral feet. No loss of protective sensation. Skin is warm to warm proximal to distal    Positive digital hair growth, strong pulses,   Feet:      Right foot:      Skin integrity: No ulcer, skin breakdown, erythema, warmth, callus or dry skin.       Left foot: Skin integrity: No ulcer, skin breakdown, erythema, warmth, callus or dry skin. Neurological:      Mental Status: He is alert.

## 2023-08-31 ENCOUNTER — APPOINTMENT (OUTPATIENT)
Dept: LAB | Facility: MEDICAL CENTER | Age: 68
End: 2023-08-31
Payer: MEDICARE

## 2023-08-31 DIAGNOSIS — E11.9 TYPE 2 DIABETES MELLITUS WITHOUT COMPLICATION, WITHOUT LONG-TERM CURRENT USE OF INSULIN (HCC): ICD-10-CM

## 2023-08-31 DIAGNOSIS — N18.31 STAGE 3A CHRONIC KIDNEY DISEASE (HCC): ICD-10-CM

## 2023-08-31 LAB
ALBUMIN SERPL BCP-MCNC: 4.4 G/DL (ref 3.5–5)
ALP SERPL-CCNC: 69 U/L (ref 34–104)
ALT SERPL W P-5'-P-CCNC: 30 U/L (ref 7–52)
ANION GAP SERPL CALCULATED.3IONS-SCNC: 12 MMOL/L
AST SERPL W P-5'-P-CCNC: 28 U/L (ref 13–39)
BILIRUB SERPL-MCNC: 0.7 MG/DL (ref 0.2–1)
BUN SERPL-MCNC: 18 MG/DL (ref 5–25)
CALCIUM SERPL-MCNC: 10.3 MG/DL (ref 8.4–10.2)
CHLORIDE SERPL-SCNC: 101 MMOL/L (ref 96–108)
CO2 SERPL-SCNC: 26 MMOL/L (ref 21–32)
CREAT SERPL-MCNC: 1.13 MG/DL (ref 0.6–1.3)
CREAT UR-MCNC: 48.9 MG/DL
EST. AVERAGE GLUCOSE BLD GHB EST-MCNC: 134 MG/DL
GFR SERPL CREATININE-BSD FRML MDRD: 66 ML/MIN/1.73SQ M
GLUCOSE P FAST SERPL-MCNC: 105 MG/DL (ref 65–99)
HBA1C MFR BLD: 6.3 %
MICROALBUMIN UR-MCNC: <7 MG/L
MICROALBUMIN/CREAT 24H UR: <14 MG/G CREATININE (ref 0–30)
POTASSIUM SERPL-SCNC: 3.7 MMOL/L (ref 3.5–5.3)
PROT SERPL-MCNC: 7 G/DL (ref 6.4–8.4)
SODIUM SERPL-SCNC: 139 MMOL/L (ref 135–147)

## 2023-08-31 PROCEDURE — 36415 COLL VENOUS BLD VENIPUNCTURE: CPT

## 2023-08-31 PROCEDURE — 80053 COMPREHEN METABOLIC PANEL: CPT

## 2023-08-31 PROCEDURE — 83036 HEMOGLOBIN GLYCOSYLATED A1C: CPT

## 2023-09-01 ENCOUNTER — RA CDI HCC (OUTPATIENT)
Dept: OTHER | Facility: HOSPITAL | Age: 68
End: 2023-09-01

## 2023-09-06 ENCOUNTER — OFFICE VISIT (OUTPATIENT)
Dept: FAMILY MEDICINE CLINIC | Facility: CLINIC | Age: 68
End: 2023-09-06
Payer: MEDICARE

## 2023-09-06 VITALS
HEIGHT: 70 IN | TEMPERATURE: 97.6 F | RESPIRATION RATE: 18 BRPM | HEART RATE: 78 BPM | BODY MASS INDEX: 33.07 KG/M2 | SYSTOLIC BLOOD PRESSURE: 136 MMHG | DIASTOLIC BLOOD PRESSURE: 72 MMHG | WEIGHT: 231 LBS

## 2023-09-06 DIAGNOSIS — M17.0 PRIMARY OSTEOARTHRITIS OF BOTH KNEES: ICD-10-CM

## 2023-09-06 DIAGNOSIS — Z23 NEED FOR IMMUNIZATION AGAINST INFLUENZA: ICD-10-CM

## 2023-09-06 DIAGNOSIS — Z00.00 MEDICARE ANNUAL WELLNESS VISIT, SUBSEQUENT: Primary | ICD-10-CM

## 2023-09-06 DIAGNOSIS — Z12.5 SCREENING FOR PROSTATE CANCER: ICD-10-CM

## 2023-09-06 DIAGNOSIS — R73.9 HYPERGLYCEMIA: ICD-10-CM

## 2023-09-06 PROCEDURE — G0008 ADMIN INFLUENZA VIRUS VAC: HCPCS | Performed by: INTERNAL MEDICINE

## 2023-09-06 PROCEDURE — G0439 PPPS, SUBSEQ VISIT: HCPCS | Performed by: INTERNAL MEDICINE

## 2023-09-06 PROCEDURE — 90662 IIV NO PRSV INCREASED AG IM: CPT | Performed by: INTERNAL MEDICINE

## 2023-09-06 NOTE — PATIENT INSTRUCTIONS
Medicare Preventive Visit Patient Instructions  Thank you for completing your Welcome to Medicare Visit or Medicare Annual Wellness Visit today. Your next wellness visit will be due in one year (9/6/2024). The screening/preventive services that you may require over the next 5-10 years are detailed below. Some tests may not apply to you based off risk factors and/or age. Screening tests ordered at today's visit but not completed yet may show as past due. Also, please note that scanned in results may not display below. Preventive Screenings:  Service Recommendations Previous Testing/Comments   Colorectal Cancer Screening  · Colonoscopy    · Fecal Occult Blood Test (FOBT)/Fecal Immunochemical Test (FIT)  · Fecal DNA/Cologuard Test  · Flexible Sigmoidoscopy Age: 43-73 years old   Colonoscopy: every 10 years (May be performed more frequently if at higher risk)  OR  FOBT/FIT: every 1 year  OR  Cologuard: every 3 years  OR  Sigmoidoscopy: every 5 years  Screening may be recommended earlier than age 39 if at higher risk for colorectal cancer. Also, an individualized decision between you and your healthcare provider will decide whether screening between the ages of 77-80 would be appropriate.  Colonoscopy: 05/05/2020  FOBT/FIT: Not on file  Cologuard: Not on file  Sigmoidoscopy: Not on file    Screening Current     Prostate Cancer Screening Individualized decision between patient and health care provider in men between ages of 53-66   Medicare will cover every 12 months beginning on the day after your 50th birthday PSA: 1.1 ng/mL           Hepatitis C Screening Once for adults born between 1945 and 1965  More frequently in patients at high risk for Hepatitis C Hep C Antibody: 11/02/2020    Screening Current   Diabetes Screening 1-2 times per year if you're at risk for diabetes or have pre-diabetes Fasting glucose: 105 mg/dL (8/31/2023)  A1C: 6.3 % (8/31/2023)  Screening Not Indicated  History Diabetes   Cholesterol Screening Once every 5 years if you don't have a lipid disorder. May order more often based on risk factors. Lipid panel: 02/21/2023  Screening Not Indicated  History Lipid Disorder      Other Preventive Screenings Covered by Medicare:  1. Abdominal Aortic Aneurysm (AAA) Screening: covered once if your at risk. You're considered to be at risk if you have a family history of AAA or a male between the age of 70-76 who smoking at least 100 cigarettes in your lifetime. 2. Lung Cancer Screening: covers low dose CT scan once per year if you meet all of the following conditions: (1) Age 48-67; (2) No signs or symptoms of lung cancer; (3) Current smoker or have quit smoking within the last 15 years; (4) You have a tobacco smoking history of at least 20 pack years (packs per day x number of years you smoked); (5) You get a written order from a healthcare provider. 3. Glaucoma Screening: covered annually if you're considered high risk: (1) You have diabetes OR (2) Family history of glaucoma OR (3)  aged 48 and older OR (3)  American aged 72 and older  3. Osteoporosis Screening: covered every 2 years if you meet one of the following conditions: (1) Have a vertebral abnormality; (2) On glucocorticoid therapy for more than 3 months; (3) Have primary hyperparathyroidism; (4) On osteoporosis medications and need to assess response to drug therapy. 5. HIV Screening: covered annually if you're between the age of 14-79. Also covered annually if you are younger than 13 and older than 72 with risk factors for HIV infection. For pregnant patients, it is covered up to 3 times per pregnancy.     Immunizations:  Immunization Recommendations   Influenza Vaccine Annual influenza vaccination during flu season is recommended for all persons aged >= 6 months who do not have contraindications   Pneumococcal Vaccine   * Pneumococcal conjugate vaccine = PCV13 (Prevnar 13), PCV15 (Vaxneuvance), PCV20 (Prevnar 20)  * Pneumococcal polysaccharide vaccine = PPSV23 (Pneumovax) Adults 2364 years old: 1-3 doses may be recommended based on certain risk factors  Adults 72 years old: 1-2 doses may be recommended based off what pneumonia vaccine you previously received   Hepatitis B Vaccine 3 dose series if at intermediate or high risk (ex: diabetes, end stage renal disease, liver disease)   Tetanus (Td) Vaccine - COST NOT COVERED BY MEDICARE PART B Following completion of primary series, a booster dose should be given every 10 years to maintain immunity against tetanus. Td may also be given as tetanus wound prophylaxis. Tdap Vaccine - COST NOT COVERED BY MEDICARE PART B Recommended at least once for all adults. For pregnant patients, recommended with each pregnancy. Shingles Vaccine (Shingrix) - COST NOT COVERED BY MEDICARE PART B  2 shot series recommended in those aged 48 and above     Health Maintenance Due:      Topic Date Due   • Colorectal Cancer Screening  05/05/2025   • Hepatitis C Screening  Completed     Immunizations Due:      Topic Date Due   • COVID-19 Vaccine (3 - Moderna series) 04/17/2021   • Influenza Vaccine (1) 09/01/2023     Advance Directives   What are advance directives? Advance directives are legal documents that state your wishes and plans for medical care. These plans are made ahead of time in case you lose your ability to make decisions for yourself. Advance directives can apply to any medical decision, such as the treatments you want, and if you want to donate organs. What are the types of advance directives? There are many types of advance directives, and each state has rules about how to use them. You may choose a combination of any of the following:  · Living will: This is a written record of the treatment you want. You can also choose which treatments you do not want, which to limit, and which to stop at a certain time. This includes surgery, medicine, IV fluid, and tube feedings.    · Durable power of  for [facility]): This is a written record that states who you want to make healthcare choices for you when you are unable to make them for yourself. This person, called a proxy, is usually a family member or a friend. You may choose more than 1 proxy. · Do not resuscitate (DNR) order:  A DNR order is used in case your heart stops beating or you stop breathing. It is a request not to have certain forms of treatment, such as CPR. A DNR order may be included in other types of advance directives. · Medical directive: This covers the care that you want if you are in a coma, near death, or unable to make decisions for yourself. You can list the treatments you want for each condition. Treatment may include pain medicine, surgery, blood transfusions, dialysis, IV or tube feedings, and a ventilator (breathing machine). · Values history: This document has questions about your views, beliefs, and how you feel and think about life. This information can help others choose the care that you would choose. Why are advance directives important? An advance directive helps you control your care. Although spoken wishes may be used, it is better to have your wishes written down. Spoken wishes can be misunderstood, or not followed. Treatments may be given even if you do not want them. An advance directive may make it easier for your family to make difficult choices about your care. Weight Management   Why it is important to manage your weight:  Being overweight increases your risk of health conditions such as heart disease, high blood pressure, type 2 diabetes, and certain types of cancer. It can also increase your risk for osteoarthritis, sleep apnea, and other respiratory problems. Aim for a slow, steady weight loss. Even a small amount of weight loss can lower your risk of health problems.   How to lose weight safely:  A safe and healthy way to lose weight is to eat fewer calories and get regular exercise. You can lose up about 1 pound a week by decreasing the number of calories you eat by 500 calories each day. Healthy meal plan for weight management:  A healthy meal plan includes a variety of foods, contains fewer calories, and helps you stay healthy. A healthy meal plan includes the following:  · Eat whole-grain foods more often. A healthy meal plan should contain fiber. Fiber is the part of grains, fruits, and vegetables that is not broken down by your body. Whole-grain foods are healthy and provide extra fiber in your diet. Some examples of whole-grain foods are whole-wheat breads and pastas, oatmeal, brown rice, and bulgur. · Eat a variety of vegetables every day. Include dark, leafy greens such as spinach, kale, jacqui greens, and mustard greens. Eat yellow and orange vegetables such as carrots, sweet potatoes, and winter squash. · Eat a variety of fruits every day. Choose fresh or canned fruit (canned in its own juice or light syrup) instead of juice. Fruit juice has very little or no fiber. · Eat low-fat dairy foods. Drink fat-free (skim) milk or 1% milk. Eat fat-free yogurt and low-fat cottage cheese. Try low-fat cheeses such as mozzarella and other reduced-fat cheeses. · Choose meat and other protein foods that are low in fat. Choose beans or other legumes such as split peas or lentils. Choose fish, skinless poultry (chicken or turkey), or lean cuts of red meat (beef or pork). Before you cook meat or poultry, cut off any visible fat. · Use less fat and oil. Try baking foods instead of frying them. Add less fat, such as margarine, sour cream, regular salad dressing and mayonnaise to foods. Eat fewer high-fat foods. Some examples of high-fat foods include french fries, doughnuts, ice cream, and cakes. · Eat fewer sweets. Limit foods and drinks that are high in sugar. This includes candy, cookies, regular soda, and sweetened drinks.   Exercise:  Exercise at least 30 minutes per day on most days of the week. Some examples of exercise include walking, biking, dancing, and swimming. You can also fit in more physical activity by taking the stairs instead of the elevator or parking farther away from stores. Ask your healthcare provider about the best exercise plan for you. Alcohol Use and Your Health    Drinking too much can harm your health. Excessive alcohol use leads to about 88,000 death in the [facility] each year, and shortens the life of those who diet by almost 30 years. Further, excessive drinking cost the economy $249 billion in 2010. Most excessive drinkers are not alcohol dependent. Excessive alcohol use has immediate effects that increase the risk of many harmful health conditions. These are most often the result of binge drinking. Over time, excessive alcohol use can lead to the development of chronic diseases and other series health problems. What is considered a "drink"? Excessive alcohol use includes:  · Binge Drinking: For women, 4 or more drinks consumed on one occasion. For men, 5 or more drinks consumed on one occasion. · Heavy Drinking: For women, 8 or more drinks per week. For men, 15 or more drinks per week  · Any alcohol used by pregnant women  · Any alcohol used by those under the age of 21 years    If you choose to drink, do so in moderation:  · Do not drink at all if you are under the age of 24, or if you are or may be pregnant, or have health problems that could be made worse by drinking.   · For women, up to 1 drink per day  · For men, up to 2 drinks a day    No one should begin drinking or drink more frequently based on potential health benefits    Short-Term Health Risks:  · Injuries: motor vehicle crashes, falls, drownings, burns  · Violence: homicide, suicide, sexual assault, intimate partner violence  · Alcohol poisoning  · Reproductive health: risky sexual behaviors, unintended prengnacy, sexually transmitted diseases, miscarriage, stillbirth, fetal alcohol syndrome    Long-Term Health Risks:  · Chronic diseases: high blood pressure, heart disease, stroke, liver disease, digestive problems  · Cancers: breast, mouth and throat, liver, colon  · Learning and memory problems: dementia, poor school performance  · Mental health: depression, anxiety, insomnia  · Social problems: lost productivity, family problems, unemployment  · Alcohol dependence    For support and more information:  · Substance Abuse and [address]  Web Address: https://[employer]/    · Alcoholics Anonymous        Web Address: http://www.[employer].com.info/    https://www.cdc.gov/alcohol/fact-sheets/alcohol-use.htm     © [employer] 2018 Information is for End User's use only and may not be sold, redistributed or otherwise used for commercial purposes.  All illustrations and images included in CareNotes® are the copyrighted property of [employer] or [address] [place] [address]

## 2023-09-06 NOTE — PROGRESS NOTES
Assessment and Plan:     Problem List Items Addressed This Visit        Musculoskeletal and Integument    Primary osteoarthritis of both knees       Other    Medicare annual wellness visit, subsequent - Primary   Other Visit Diagnoses     Hyperglycemia        Relevant Orders    HEMOGLOBIN A1C W/ EAG ESTIMATION    Comprehensive metabolic panel    Screening for prostate cancer        Relevant Orders    PSA, Total Screen      Pt has appt with Dr Yenifer Brower in October and will reach out regarding status of knee tx planned  He will improve diet choices and continue home exercise as able  Stay hydrated - GFR has improved from prior  Pt has ACPdocuments and recommended copy for our chart   RTO 6months/prn    Depression Screening and Follow-up Plan: Patient was screened for depression during today's encounter. They screened negative with a PHQ-2 score of 0. Preventive health issues were discussed with patient, and age appropriate screening tests were ordered as noted in patient's After Visit Summary. Personalized health advice and appropriate referrals for health education or preventive services given if needed, as noted in patient's After Visit Summary. History of Present Illness:     Patient presents for a Medicare Wellness Visit    HPI   Pt doing ok although aware he has gained weight over the past few months His knee sxs seem a bit more noted and he has appt with Dr Yenifer Brower as injections did not benefit significantly No chest pain or sob He is drinking more water in general since last visit   Patient Care Team:  Chhaya Vale DO as PCP - General (Internal Medicine)     Review of Systems:     Review of Systems   Constitutional: Negative for chills and fever. HENT: Negative. Eyes: Negative for visual disturbance. Respiratory: Negative for cough and shortness of breath. Cardiovascular: Negative. Gastrointestinal: Negative. Genitourinary: Negative.     Musculoskeletal: Positive for arthralgias and gait problem. Neurological: Negative for dizziness, light-headedness and headaches. Psychiatric/Behavioral: Negative for sleep disturbance. The patient is not nervous/anxious.          Problem List:     Patient Active Problem List   Diagnosis   • Benign essential hypertension   • Class 2 obesity due to excess calories in adult   • Mixed hyperlipidemia   • Primary osteoarthritis involving multiple joints   • Sleep apnea in adult   • Type 2 diabetes mellitus with chronic kidney disease, with long-term current use of insulin, unspecified CKD stage (HCC)   • Aortic valve sclerosis   • Primary osteoarthritis of right knee   • Chronic idiopathic gout of foot   • Medicare annual wellness visit, subsequent   • Stage 3a chronic kidney disease ([address])   • Primary osteoarthritis of both knees      Past Medical and Surgical History:     Past Medical History:   Diagnosis Date   • Arthritis    • Diabetes mellitus ([address])    • Difficulty walking    • DM (diabetes mellitus) ([address])    • GERD (gastroesophageal reflux disease)    • Gout    • Hyperlipidemia    • Hypertension    • Osteoarthritis    • Sleep apnea      Past Surgical History:   Procedure Laterality Date   • HERNIA REPAIR     • LAMINECTOMY     • TONSILLECTOMY        Family History:     Family History   Problem Relation Age of Onset   • Hypertension Mother    • Colon cancer Mother    • Cancer Mother         Colon Cancer   • Hypertension Father    • Stroke Father    • Prostate cancer Brother       Social History:     Social History     Socioeconomic History   • Marital status: /Civil Union     Spouse name: None   • Number of children: None   • Years of education: None   • Highest education level: None   Occupational History   • None   Tobacco Use   • Smoking status: Former     Packs/day: 0.50     Years: 10.00     Total pack years: 5.00     Types: Cigarettes     Start date: 1975     Quit date: 1985     Years since quittin.7   • Smokeless tobacco: Never Vaping Use   • Vaping Use: Never used   Substance and Sexual Activity   • Alcohol use: Yes     Alcohol/week: 3.0 standard drinks of alcohol     Types: 3 Shots of liquor per week   • Drug use: Never   • Sexual activity: None   Other Topics Concern   • None   Social History Narrative   • None     Social Determinants of Health     Financial Resource Strain: Low Risk  (8/30/2023)    Overall Financial Resource Strain (CARDIA)    • Difficulty of Paying Living Expenses: Not hard at all   Food Insecurity: Not on file   Transportation Needs: No Transportation Needs (8/30/2023)    PRAPARE - Transportation    • Lack of Transportation (Medical): No    • Lack of Transportation (Non-Medical): No   Physical Activity: Not on file   Stress: Not on file   Social Connections: Not on file   Intimate Partner Violence: Not on file   Housing Stability: Not on file      Medications and Allergies:     Current Outpatient Medications   Medication Sig Dispense Refill   • allopurinol (ZYLOPRIM) 300 mg tablet TAKE 1 TABLET DAILY 90 tablet 3   • atorvastatin (LIPITOR) 80 mg tablet TAKE 1 TABLET DAILY 90 tablet 3   • EPINEPHrine (EPIPEN) 0.3 mg/0.3 mL SOAJ Inject 0.3mg/0.3ml as needed for allergic rxn     • hydrochlorothiazide (HYDRODIURIL) 25 mg tablet TAKE 2 TABLETS DAILY 180 tablet 3   • losartan (COZAAR) 50 mg tablet TAKE 1 TABLET DAILY 90 tablet 3   • metFORMIN (GLUCOPHAGE-XR) 500 mg 24 hr tablet TAKE 1 TABLET TWICE DAILY  WITH MEALS 180 tablet 3   • pantoprazole (PROTONIX) 40 mg tablet TAKE 1 TABLET DAILY 90 tablet 3   • pramipexole (MIRAPEX) 0.125 mg tablet Take 1 tablet (0.125 mg total) by mouth daily at bedtime 30 tablet 5   • VITAMIN D PO Take 2,000 Int'l Units by mouth daily     • ibuprofen (MOTRIN) 600 mg tablet      • predniSONE 10 mg tablet Take as directed with food thru taper (Patient not taking: Reported on 5/24/2023) 40 tablet 0     No current facility-administered medications for this visit.      Allergies   Allergen Reactions • Nuts - Food Allergy Anaphylaxis      Immunizations:     Immunization History   Administered Date(s) Administered   • COVID-19 MODERNA VACC 0.5 ML IM 01/23/2021, 02/20/2021   • Influenza Injectable, MDCK, Preservative Free, Quadrivalent, 0.5 mL 09/24/2019, 10/19/2020   • Influenza, high dose seasonal 0.7 mL 08/30/2022   • Pneumococcal Conjugate 13-Valent 03/18/2021   • Pneumococcal Conjugate Vaccine 20-valent (Pcv20), Polysace 08/30/2022   • Tdap 10/19/2020      Health Maintenance:         Topic Date Due   • Colorectal Cancer Screening  05/05/2025   • Hepatitis C Screening  Completed         Topic Date Due   • COVID-19 Vaccine (3 - Moderna series) 04/17/2021   • Influenza Vaccine (1) 09/01/2023      Medicare Screening Tests and Risk Assessments:     Mare Davis is here for his Subsequent Wellness visit. Last Medicare Wellness visit information reviewed, patient interviewed, no change since last AWV. Health Risk Assessment:   Patient rates overall health as good. Patient feels that their physical health rating is same. Patient is very satisfied with their life. Eyesight was rated as same. Hearing was rated as same. Patient feels that their emotional and mental health rating is same. Patients states they are never, rarely angry. Patient states they are sometimes unusually tired/fatigued. Pain experienced in the last 7 days has been some. Patient's pain rating has been 6/10. Patient states that he has experienced no weight loss or gain in last 6 months. Depression Screening:   PHQ-2 Score: 0      Fall Risk Screening: In the past year, patient has experienced: no history of falling in past year      Home Safety:  Patient has trouble with stairs inside or outside of their home. Patient has working smoke alarms and has working carbon monoxide detector. Home safety hazards include: none. Nutrition:   Current diet is Diabetic. Medications:   Patient is not currently taking any over-the-counter supplements. Patient is able to manage medications. Activities of Daily Living (ADLs)/Instrumental Activities of Daily Living (IADLs):   Walk and transfer into and out of bed and chair?: Yes  Dress and groom yourself?: Yes    Bathe or shower yourself?: Yes    Feed yourself? Yes  Do your laundry/housekeeping?: Yes  Manage your money, pay your bills and track your expenses?: Yes  Make your own meals?: Yes    Do your own shopping?: Yes    Previous Hospitalizations:   Any hospitalizations or ED visits within the last 12 months?: No      Advance Care Planning:   Living will: Yes    Durable POA for healthcare: Yes    Advanced directive: Yes    End of Life Decisions reviewed with patient: Yes    Provider agrees with end of life decisions: Yes      Cognitive Screening:   Provider or family/friend/caregiver concerned regarding cognition?: No    PREVENTIVE SCREENINGS      Cardiovascular Screening:    General: History Lipid Disorder and Screening Current      Diabetes Screening:     General: History Diabetes and Screening Current      Colorectal Cancer Screening:     General: Screening Current      Prostate Cancer Screening:    General: Screening Current      Osteoporosis Screening:    General: Screening Not Indicated      Abdominal Aortic Aneurysm (AAA) Screening:    Risk factors include: age between 70-75 yo and tobacco use        Lung Cancer Screening:     General: Screening Not Indicated      Hepatitis C Screening:    General: Screening Current    Screening, Brief Intervention, and Referral to Treatment (SBIRT)    Screening  Typical number of drinks in a day: 1  Typical number of drinks in a week: 7  Interpretation: Low risk drinking behavior. AUDIT-C Screenin) How often did you have a drink containing alcohol in the past year? 4 or more times a week  2) How many drinks did you have on a typical day when you were drinking in the past year? 1 to 2  3) How often did you have 6 or more drinks on one occasion in the past year? never    AUDIT-C Score: 4  Interpretation: Score 4-12 (male): POSITIVE screen for alcohol misuse    AUDIT Screenin) How often during the last year have you found that you were not able to stop drinking once you had started? 0 - never  5) How often during the last year have you failed to do what was normally expected from you because of drinking? 0 - never  6) How often during the last year have you needed a first drink in the morning to get yourself going after a heavy drinking session? 0 - never  7) How often during the last year have you had a feeling of guilt or remorse after drinking? 0 - never  8) How often during the last year have you been unable to remember what happened the night before because you had been drinking? 0 - never  9) Have you or someone else been injured as a result of your drinking? 0 - no  10) Has a relative or friend or a doctor or another health worker been concerned about your drinking or suggested you cut down? 0 - no    AUDIT Score: 4  Interpretation: Low risk alcohol consumption    Single Item Drug Screening:  How often have you used an illegal drug (including marijuana) or a prescription medication for non-medical reasons in the past year? never    Single Item Drug Screen Score: 0  Interpretation: Negative screen for possible drug use disorder    Brief Intervention  Healthy alcohol use/limits discussed. Other Counseling Topics:   Regular weightbearing exercise and calcium and vitamin D intake. No results found. Physical Exam:     /72   Pulse 78   Temp 97.6 °F (36.4 °C) (Temporal)   Resp 18   Ht 5' 10" (1.778 m)   Wt 105 kg (231 lb)   BMI 33.15 kg/m²     Physical Exam  Vitals reviewed. Constitutional:       General: He is not in acute distress. Appearance: Normal appearance. He is not ill-appearing, toxic-appearing or diaphoretic. HENT:      Head: Normocephalic and atraumatic.       Right Ear: External ear normal.      Left Ear: External ear normal. Nose: Nose normal.      Mouth/Throat:      Mouth: Mucous membranes are moist.   Eyes:      General: No scleral icterus. Extraocular Movements: Extraocular movements intact. Conjunctiva/sclera: Conjunctivae normal.      Pupils: Pupils are equal, round, and reactive to light. Cardiovascular:      Rate and Rhythm: Normal rate and regular rhythm. Pulses: Normal pulses. Heart sounds: Normal heart sounds. No murmur heard. Pulmonary:      Effort: Pulmonary effort is normal. No respiratory distress. Breath sounds: Normal breath sounds. No wheezing. Abdominal:      General: Bowel sounds are normal. There is no distension. Palpations: Abdomen is soft. Tenderness: There is no abdominal tenderness. Musculoskeletal:      Cervical back: Normal range of motion and neck supple. Right lower leg: No edema. Left lower leg: No edema. Lymphadenopathy:      Cervical: No cervical adenopathy. Skin:     General: Skin is warm and dry. Coloration: Skin is not jaundiced or pale. Neurological:      General: No focal deficit present. Mental Status: He is alert and oriented to person, place, and time. Mental status is at baseline. Cranial Nerves: No cranial nerve deficit. Sensory: No sensory deficit. Psychiatric:         Mood and Affect: Mood normal.         Behavior: Behavior normal.         Thought Content:  Thought content normal.         Judgment: Judgment normal.          Cole Ferrer DO

## 2023-11-05 PROBLEM — Z00.00 MEDICARE ANNUAL WELLNESS VISIT, SUBSEQUENT: Status: RESOLVED | Noted: 2022-08-30 | Resolved: 2023-11-05

## 2023-12-11 DIAGNOSIS — G25.81 RESTLESS LEG SYNDROME: ICD-10-CM

## 2023-12-11 DIAGNOSIS — M19.90 INFLAMMATORY ARTHRITIS: ICD-10-CM

## 2023-12-11 RX ORDER — PRAMIPEXOLE DIHYDROCHLORIDE 0.12 MG/1
0.12 TABLET ORAL
Qty: 30 TABLET | Refills: 5 | Status: SHIPPED | OUTPATIENT
Start: 2023-12-11

## 2023-12-11 RX ORDER — PREDNISONE 10 MG/1
TABLET ORAL
Qty: 40 TABLET | Refills: 0 | Status: SHIPPED | OUTPATIENT
Start: 2023-12-11

## 2024-02-07 ENCOUNTER — APPOINTMENT (OUTPATIENT)
Dept: LAB | Facility: HOSPITAL | Age: 69
End: 2024-02-07
Attending: INTERNAL MEDICINE
Payer: MEDICARE

## 2024-02-07 ENCOUNTER — APPOINTMENT (OUTPATIENT)
Dept: LAB | Facility: HOSPITAL | Age: 69
End: 2024-02-07
Attending: ORTHOPAEDIC SURGERY
Payer: MEDICARE

## 2024-02-07 DIAGNOSIS — Z12.5 SCREENING FOR PROSTATE CANCER: ICD-10-CM

## 2024-02-07 DIAGNOSIS — Z01.818 OTHER SPECIFIED PRE-OPERATIVE EXAMINATION: ICD-10-CM

## 2024-02-07 DIAGNOSIS — R73.9 HYPERGLYCEMIA: ICD-10-CM

## 2024-02-07 LAB
ALBUMIN SERPL BCP-MCNC: 4.7 G/DL (ref 3.5–5)
ALP SERPL-CCNC: 84 U/L (ref 34–104)
ALT SERPL W P-5'-P-CCNC: 27 U/L (ref 7–52)
ANION GAP SERPL CALCULATED.3IONS-SCNC: 9 MMOL/L
AST SERPL W P-5'-P-CCNC: 25 U/L (ref 13–39)
BASOPHILS # BLD AUTO: 0.06 THOUSANDS/ÂΜL (ref 0–0.1)
BASOPHILS NFR BLD AUTO: 1 % (ref 0–1)
BILIRUB SERPL-MCNC: 0.7 MG/DL (ref 0.2–1)
BUN SERPL-MCNC: 21 MG/DL (ref 5–25)
CALCIUM SERPL-MCNC: 10.7 MG/DL (ref 8.4–10.2)
CHLORIDE SERPL-SCNC: 99 MMOL/L (ref 96–108)
CO2 SERPL-SCNC: 31 MMOL/L (ref 21–32)
CREAT SERPL-MCNC: 1.12 MG/DL (ref 0.6–1.3)
EOSINOPHIL # BLD AUTO: 0.31 THOUSAND/ÂΜL (ref 0–0.61)
EOSINOPHIL NFR BLD AUTO: 4 % (ref 0–6)
ERYTHROCYTE [DISTWIDTH] IN BLOOD BY AUTOMATED COUNT: 12.7 % (ref 11.6–15.1)
EST. AVERAGE GLUCOSE BLD GHB EST-MCNC: 140 MG/DL
GFR SERPL CREATININE-BSD FRML MDRD: 67 ML/MIN/1.73SQ M
GLUCOSE P FAST SERPL-MCNC: 107 MG/DL (ref 65–99)
HBA1C MFR BLD: 6.5 %
HCT VFR BLD AUTO: 42.1 % (ref 36.5–49.3)
HGB BLD-MCNC: 13.9 G/DL (ref 12–17)
IMM GRANULOCYTES # BLD AUTO: 0.02 THOUSAND/UL (ref 0–0.2)
IMM GRANULOCYTES NFR BLD AUTO: 0 % (ref 0–2)
LYMPHOCYTES # BLD AUTO: 2.45 THOUSANDS/ÂΜL (ref 0.6–4.47)
LYMPHOCYTES NFR BLD AUTO: 29 % (ref 14–44)
MCH RBC QN AUTO: 30.6 PG (ref 26.8–34.3)
MCHC RBC AUTO-ENTMCNC: 33 G/DL (ref 31.4–37.4)
MCV RBC AUTO: 93 FL (ref 82–98)
MONOCYTES # BLD AUTO: 0.71 THOUSAND/ÂΜL (ref 0.17–1.22)
MONOCYTES NFR BLD AUTO: 8 % (ref 4–12)
NEUTROPHILS # BLD AUTO: 4.88 THOUSANDS/ÂΜL (ref 1.85–7.62)
NEUTS SEG NFR BLD AUTO: 58 % (ref 43–75)
NRBC BLD AUTO-RTO: 0 /100 WBCS
PLATELET # BLD AUTO: 284 THOUSANDS/UL (ref 149–390)
PMV BLD AUTO: 11 FL (ref 8.9–12.7)
POTASSIUM SERPL-SCNC: 4 MMOL/L (ref 3.5–5.3)
PROT SERPL-MCNC: 7.7 G/DL (ref 6.4–8.4)
PSA SERPL-MCNC: 1.43 NG/ML (ref 0–4)
RBC # BLD AUTO: 4.54 MILLION/UL (ref 3.88–5.62)
SODIUM SERPL-SCNC: 139 MMOL/L (ref 135–147)
WBC # BLD AUTO: 8.43 THOUSAND/UL (ref 4.31–10.16)

## 2024-02-07 PROCEDURE — G0103 PSA SCREENING: HCPCS

## 2024-02-07 PROCEDURE — 83036 HEMOGLOBIN GLYCOSYLATED A1C: CPT

## 2024-02-07 PROCEDURE — 36415 COLL VENOUS BLD VENIPUNCTURE: CPT

## 2024-02-07 PROCEDURE — 85025 COMPLETE CBC W/AUTO DIFF WBC: CPT

## 2024-02-07 PROCEDURE — 80053 COMPREHEN METABOLIC PANEL: CPT

## 2024-02-08 LAB
ATRIAL RATE: 67 BPM
P AXIS: 44 DEGREES
PR INTERVAL: 196 MS
QRS AXIS: 16 DEGREES
QRSD INTERVAL: 74 MS
QT INTERVAL: 392 MS
QTC INTERVAL: 414 MS
T WAVE AXIS: 40 DEGREES
VENTRICULAR RATE: 67 BPM

## 2024-02-24 ENCOUNTER — CONSULT (OUTPATIENT)
Dept: FAMILY MEDICINE CLINIC | Facility: CLINIC | Age: 69
End: 2024-02-24
Payer: MEDICARE

## 2024-02-24 VITALS
SYSTOLIC BLOOD PRESSURE: 124 MMHG | DIASTOLIC BLOOD PRESSURE: 78 MMHG | TEMPERATURE: 98 F | WEIGHT: 235.8 LBS | HEIGHT: 70 IN | RESPIRATION RATE: 18 BRPM | OXYGEN SATURATION: 96 % | BODY MASS INDEX: 33.76 KG/M2 | HEART RATE: 92 BPM

## 2024-02-24 DIAGNOSIS — E78.2 MIXED HYPERLIPIDEMIA: ICD-10-CM

## 2024-02-24 DIAGNOSIS — I10 BENIGN ESSENTIAL HYPERTENSION: ICD-10-CM

## 2024-02-24 DIAGNOSIS — E66.09 CLASS 2 OBESITY DUE TO EXCESS CALORIES IN ADULT, UNSPECIFIED BMI, UNSPECIFIED WHETHER SERIOUS COMORBIDITY PRESENT: ICD-10-CM

## 2024-02-24 DIAGNOSIS — E11.9 TYPE 2 DIABETES MELLITUS WITHOUT COMPLICATION, WITHOUT LONG-TERM CURRENT USE OF INSULIN (HCC): ICD-10-CM

## 2024-02-24 DIAGNOSIS — I35.8 AORTIC VALVE SCLEROSIS: ICD-10-CM

## 2024-02-24 DIAGNOSIS — M17.12 PRIMARY OSTEOARTHRITIS OF LEFT KNEE: Primary | ICD-10-CM

## 2024-02-24 PROBLEM — N18.31 STAGE 3A CHRONIC KIDNEY DISEASE (HCC): Status: RESOLVED | Noted: 2023-02-22 | Resolved: 2024-02-24

## 2024-02-24 PROCEDURE — 99214 OFFICE O/P EST MOD 30 MIN: CPT | Performed by: INTERNAL MEDICINE

## 2024-02-24 RX ORDER — CELECOXIB 200 MG/1
CAPSULE ORAL
COMMUNITY
Start: 2024-02-01 | End: 2024-02-24 | Stop reason: ALTCHOICE

## 2024-02-24 RX ORDER — ATORVASTATIN CALCIUM 80 MG/1
80 TABLET, FILM COATED ORAL DAILY
Qty: 90 TABLET | Refills: 3 | Status: SHIPPED | OUTPATIENT
Start: 2024-02-24

## 2024-02-24 RX ORDER — ATORVASTATIN CALCIUM 80 MG/1
80 TABLET, FILM COATED ORAL DAILY
Qty: 90 TABLET | Refills: 3 | OUTPATIENT
Start: 2024-02-24

## 2024-02-24 NOTE — PROGRESS NOTES
Name: Jovany Little      : 1955      MRN: [id number]  Encounter Provider: Jacqueline Ventura DO  Encounter Date: 2024   Encounter department: [facility]    Assessment & Plan     1. Primary osteoarthritis of left knee  Pt cleared for knee surgery 3/1  He should hold Metformin day prior to surgery   Stay hydrated and hold mvi but suspect mild increase calcium may be lab error as recent issue with elevated levels - recheck in 2 weeks with pth for completion       2. Benign essential hypertension  -     atorvastatin (LIPITOR) 80 mg tablet; Take 1 tablet (80 mg total) by mouth daily  Bp stable lo sodium diet and exercise - pt is hopeful to be more active in spring when recovered     3. Type 2 diabetes mellitus without complication, without long-term current use of insulin (HCC)  -     atorvastatin (LIPITOR) 80 mg tablet; Take 1 tablet (80 mg total) by mouth daily  -     Comprehensive metabolic panel; Future  -     Hemoglobin A1C; Future; Expected date: 2024  -     Albumin / creatinine urine ratio; Future  He has resumed healthier diet and hopes once recovered from knee surgery can exercise daily Wants to lose weight and hopes to get off rx     4. Aortic valve sclerosis  -     atorvastatin (LIPITOR) 80 mg tablet; Take 1 tablet (80 mg total) by mouth daily  Stable and followed by Cardiology    5. Mixed hyperlipidemia  -     atorvastatin (LIPITOR) 80 mg tablet; Take 1 tablet (80 mg total) by mouth daily  Lo fat diet and exercise     6. Class 2 obesity due to excess calories in adult, unspecified BMI, unspecified whether serious comorbidity present  -     atorvastatin (LIPITOR) 80 mg tablet; Take 1 tablet (80 mg total) by mouth daily    EKG normal CXR normal A1c 6.5    Rto 6months    Subjective      HPI  Pt for right tkr 3/1 Dr Allen He is ready to get knee fixed so he can be more active He did do preop PT eval and has stationary bike at home Diet has not been ideal and has gained weight  which he plans to lose and has resumed healthier diet already He is drinking more water and does feel better since doing so No chest pain or sob No cough No fever or chills Bowels are regular No recent illness Had PATs  Spinal anesthesia planned He did speak with anesthesia yesterday   Review of Systems   Constitutional:  Positive for activity change. Negative for chills and fever.   HENT: Negative.     Eyes:  Negative for visual disturbance.   Respiratory:  Negative for cough and shortness of breath.    Cardiovascular:  Negative for chest pain, palpitations and leg swelling.   Gastrointestinal:  Negative for abdominal distention and abdominal pain.   Genitourinary: Negative.    Musculoskeletal:  Positive for arthralgias and gait problem.   Skin:  Negative for pallor and wound.   Neurological:  Negative for dizziness, light-headedness and headaches.   Psychiatric/Behavioral:  Negative for sleep disturbance. The patient is not nervous/anxious.        Current Outpatient Medications on File Prior to Visit   Medication Sig   • allopurinol (ZYLOPRIM) 300 mg tablet TAKE 1 TABLET DAILY   • hydrochlorothiazide (HYDRODIURIL) 25 mg tablet TAKE 2 TABLETS DAILY   • losartan (COZAAR) 50 mg tablet TAKE 1 TABLET DAILY   • metFORMIN (GLUCOPHAGE-XR) 500 mg 24 hr tablet TAKE 1 TABLET TWICE DAILY  WITH MEALS   • pantoprazole (PROTONIX) 40 mg tablet TAKE 1 TABLET DAILY   • VITAMIN D PO Take 2,000 Int'l Units by mouth daily   • [DISCONTINUED] atorvastatin (LIPITOR) 80 mg tablet TAKE 1 TABLET DAILY   • [DISCONTINUED] Antiseptic Skin Cleanser 4 % topical solutio  (Patient not taking: Reported on 2/24/2024)   • [DISCONTINUED] celecoxib (CeleBREX) 200 mg capsule  (Patient not taking: Reported on 2/24/2024)   • [DISCONTINUED] EPINEPHrine (EPIPEN) 0.3 mg/0.3 mL SOAJ Inject 0.3mg/0.3ml as needed for allergic rxn (Patient not taking: Reported on 2/24/2024)   • [DISCONTINUED] ibuprofen (MOTRIN) 600 mg tablet    • [DISCONTINUED] mupirocin  "(BACTROBAN) 2 % ointment  (Patient not taking: Reported on 2/24/2024)   • [DISCONTINUED] pramipexole (MIRAPEX) 0.125 mg tablet Take 1 tablet (0.125 mg total) by mouth daily at bedtime (Patient not taking: Reported on 2/24/2024)   • [DISCONTINUED] predniSONE 10 mg tablet Take as directed with food thru taper (Patient not taking: Reported on 2/24/2024)       Objective     /78   Pulse 92   Temp 98 °F (36.7 °C)   Resp 18   Ht 5' 10\" (1.778 m)   Wt 107 kg (235 lb 12.8 oz)   SpO2 96%   BMI 33.83 kg/m²     Physical Exam  Vitals and nursing note reviewed.   Constitutional:       General: He is not in acute distress.     Appearance: Normal appearance. He is not ill-appearing, toxic-appearing or diaphoretic.   HENT:      Head: Normocephalic and atraumatic.      Right Ear: External ear normal.      Left Ear: External ear normal.      Nose: Nose normal.      Mouth/Throat:      Mouth: Mucous membranes are moist.   Eyes:      General: No scleral icterus.  Cardiovascular:      Rate and Rhythm: Normal rate and regular rhythm.      Pulses: Normal pulses.      Heart sounds: Normal heart sounds.   Pulmonary:      Effort: Pulmonary effort is normal. No respiratory distress.      Breath sounds: Normal breath sounds. No wheezing.   Abdominal:      General: Bowel sounds are normal. There is no distension.      Palpations: Abdomen is soft.      Tenderness: There is no abdominal tenderness.   Musculoskeletal:      Cervical back: Normal range of motion and neck supple.      Right lower leg: No edema.      Left lower leg: No edema.   Lymphadenopathy:      Cervical: No cervical adenopathy.   Skin:     General: Skin is warm and dry.      Coloration: Skin is not jaundiced or pale.   Neurological:      General: No focal deficit present.      Mental Status: He is alert and oriented to person, place, and time.      Cranial Nerves: No cranial nerve deficit.   Psychiatric:         Mood and Affect: Mood normal.         Behavior: Behavior " normal.         Thought Content: Thought content normal.         Judgment: Judgment normal.   Jacqueline Ventura, DO

## 2024-04-23 LAB
LEFT EYE DIABETIC RETINOPATHY: NORMAL
RIGHT EYE DIABETIC RETINOPATHY: NORMAL

## 2024-08-09 DIAGNOSIS — M99.01 CERVICOTHORACIC SOMATIC DYSFUNCTION: Primary | ICD-10-CM

## 2024-08-09 RX ORDER — METHYLPREDNISOLONE 4 MG/1
TABLET ORAL
Qty: 21 EACH | Refills: 0 | Status: SHIPPED | OUTPATIENT
Start: 2024-08-09

## 2024-08-09 RX ORDER — METHOCARBAMOL 500 MG/1
500 TABLET, FILM COATED ORAL 2 TIMES DAILY PRN
Qty: 30 TABLET | Refills: 0 | Status: SHIPPED | OUTPATIENT
Start: 2024-08-09

## 2024-08-12 ENCOUNTER — OFFICE VISIT (OUTPATIENT)
Dept: PODIATRY | Facility: CLINIC | Age: 69
End: 2024-08-12
Payer: MEDICARE

## 2024-08-12 VITALS
OXYGEN SATURATION: 97 % | RESPIRATION RATE: 16 BRPM | HEART RATE: 75 BPM | HEIGHT: 69 IN | WEIGHT: 232.8 LBS | DIASTOLIC BLOOD PRESSURE: 74 MMHG | BODY MASS INDEX: 34.48 KG/M2 | TEMPERATURE: 98.1 F | SYSTOLIC BLOOD PRESSURE: 124 MMHG

## 2024-08-12 DIAGNOSIS — L85.3 XEROSIS OF SKIN: ICD-10-CM

## 2024-08-12 DIAGNOSIS — E11.9 TYPE 2 DIABETES MELLITUS WITHOUT COMPLICATION, WITHOUT LONG-TERM CURRENT USE OF INSULIN (HCC): Primary | ICD-10-CM

## 2024-08-12 PROCEDURE — 99212 OFFICE O/P EST SF 10 MIN: CPT | Performed by: PODIATRIST

## 2024-08-12 NOTE — PROGRESS NOTES
Diabetic Foot Exam    Patient's shoes and socks removed.    Right Foot/Ankle   Right Foot Inspection  Skin Exam: dry skin.     Toe Exam: ROM and strength within normal limits.     Sensory   Monofilament testing: absent    Vascular  Capillary refills: < 3 seconds  The right DP pulse is 2+. The right PT pulse is 1+.     Left Foot/Ankle  Left Foot Inspection  Skin Exam: dry skin.     Toe Exam: ROM and strength within normal limits.     Sensory   Monofilament testing: intact    Vascular  Capillary refills: < 3 seconds  The left DP pulse is 2+. The left PT pulse is 1+.     Assign Risk Category  No deformity present  Loss of protective sensation  No weak pulses  Risk: 1      Ambulatory Visit  Name: Jovany Little      : 1955      MRN: [id number]  Encounter Provider: Teddy Florez DPM  Encounter Date: 2024   Encounter department: [facility] [place]    Assessment & Plan   1. Type 2 diabetes mellitus without complication, without long-term current use of insulin (HCC)  2. Xerosis of skin      History of Present Illness     Jovany Little is a 68 y.o. male who presents for his yearly diabetic foot exam.  He relates that he has a follow for his diabetes within the next month or so.   Lab Results   Component Value Date    HGBA1C 6.5 (H) 2024      Review of Systems  Medical History Reviewed by provider this encounter:       Current Outpatient Medications on File Prior to Visit   Medication Sig Dispense Refill    allopurinol (ZYLOPRIM) 300 mg tablet TAKE 1 TABLET DAILY 90 tablet 3    atorvastatin (LIPITOR) 80 mg tablet Take 1 tablet (80 mg total) by mouth daily 90 tablet 3    hydrochlorothiazide (HYDRODIURIL) 25 mg tablet TAKE 2 TABLETS DAILY 180 tablet 3    Iron, Ferrous Sulfate, 325 (65 Fe) MG TABS Take 325 mg by mouth daily      losartan (COZAAR) 50 mg tablet TAKE 1 TABLET DAILY 90 tablet 3    metFORMIN (GLUCOPHAGE-XR) 500 mg 24 hr tablet TAKE 1 TABLET TWICE DAILY  WITH MEALS 180  "tablet 3    methocarbamol (ROBAXIN) 500 mg tablet Take 1 tablet (500 mg total) by mouth 2 (two) times a day as needed for muscle spasms 30 tablet 0    methylPREDNISolone 4 MG tablet therapy pack Use as directed on package 21 each 0    pantoprazole (PROTONIX) 40 mg tablet TAKE 1 TABLET DAILY 90 tablet 3    VITAMIN D PO Take 2,000 Int'l Units by mouth daily       No current facility-administered medications on file prior to visit.      Objective     /74 (BP Location: Left arm, Patient Position: Sitting, Cuff Size: Extra-Large)   Pulse 75   Temp 98.1 °F (36.7 °C) (Temporal)   Resp 16   Ht 5' 9\" (1.753 m)   Wt 106 kg (232 lb 12.8 oz)   SpO2 97%   BMI 34.38 kg/m²     Physical Exam  Cardiovascular:      Pulses: no weak pulses.           Dorsalis pedis pulses are 2+ on the right side and 2+ on the left side.        Posterior tibial pulses are 1+ on the right side and 1+ on the left side.   Musculoskeletal:        Feet:    Feet:      Right foot:      Skin integrity: Dry skin present.      Toenail Condition: Right toenails are abnormally thick.      Left foot:      Skin integrity: Dry skin present.       Administrative Statements   I have spent a total time of 15 minutes in caring for this patient on the day of the visit/encounter including Instructions for management and Counseling / Coordination of care.    He was instructed to use lotion on the bottom of both feet daily and to increase his water intake back to the at least the 64 ounces that he had been doing.      He was told to continue to exam both of his feet daily and look for anything not seen before and call for  an appointment.    Return in about 1 year (around 8/12/2025). .    "

## 2024-08-22 NOTE — PROGRESS NOTES
PT Evaluation     Today's date: 2024  Patient name: Jovany Little  : 1955  MRN: [id number]  Referring provider: Jacqueline Ventura DO  Dx:   Encounter Diagnosis     ICD-10-CM    1. Cervicothoracic somatic dysfunction  M99.01 Ambulatory Referral to Physical Therapy                     Assessment    Assessment details:   CURRENT FUNCTIONAL STATUS    Sitting tolerance 5-10 minutes.    Ability to turn neck right:  Poor  Ability to look up: Poor   Ability to reach forward: Poor  Lifting tolerance 10-15 lbs.    SHORT TERM GOALS (2 WEEKS)    Increase cervical spine AROM 25 % in all restricted planes.  Improve sitting posture.  Decrease pain to 0-4/10.  Independent with HEP.  Sitting tolerance 30 minutes.    Ability to turn neck right:  Fair  Ability to look up: Fair   Ability to reach forward: fair  Lifting tolerance 25-30 lbs.    LONG TERM GOALS (DISCHARGE)    Cervical Spine AROM: WFL in all planes.  Sitting posture: Good  Decrease pain to 0-2/10.  Independent with HEP.  Sitting tolerance 60 minutes.    Ability to turn neck right:  Good  Ability to look up: Good   Ability to reach forward: Good  Lifting tolerance 40 lbs.      Understanding of Dx/Px/POC: good     Prognosis: good    Goals  See assessment details above.      Plan  Patient would benefit from: skilled physical therapy  Planned modality interventions: cryotherapy and thermotherapy: hydrocollator packs    Planned therapy interventions: manual therapy, neuromuscular re-education, therapeutic activities and home exercise program    Frequency: 2x week  Duration in weeks: 4  Plan details: Jovany Little is a 68 y.o. male presenting to PT with pain, decreased range of motion, poor posture, and decreased tolerance to activity. This patient would benefit from skilled PT services to address these issues and to maximize function. A home exercise program was provided and all questions were answered. Thank you for the referral.        Subjective  "Evaluation    History of Present Illness  Mechanism of injury: CC: Intermittent right sided mid back pain radiating into the right axilla, and intermittent numbness and tingling in the inner arm, forearm, and last 2 digits of the right hand. Denies having any weakness  HPI: The patient's pain began when he picked up a heavy garbage can at the end of July. A medrol dose pack did relieve his pain when taking it, but currently his symptoms have not improved since onset. He has not had any imaging studies done. He has a PMH of a C3-C4 fusion. 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   Patient Goals  Patient goals for therapy: decreased pain and increased motion  Patient goal: Improved ability to sleep, sit, and lift.  Pain  Current pain ratin  At best pain ratin  At worst pain ratin          Objective     Postural Observations    Additional Postural Observation Details  Marked forward head sitting posture.    General Comments:      Cervical/Thoracic Comments  CURRENT OBJECTIVE MEASUREMENTS    Cervical Spine AROM: F=90 %, EXT=25 %, R ROT=50 %, L ROT=75 %, R SB=25 %, L SB=50 %.  Upper Extremity Strength: 5/5 t/o bilaterally except right thumb extension 4/5  Upper Extremity Reflexes: +2 bilaterally  Upper Extremity Sensation: Intact to light touch bilaterally  Sitting Posture: Poor                        Precautions: None      Manuals         STM         P/A Mob         Upper Cervical Traction                  Neuro Re-Ed         3 Position Prone Scapular Retraction          Swiss Ball Arm Raises         Sitting Posture Correction with Roll RK        Centralization Concepts RK        Body Mechanics Instruction for Lifting         Disc Mechanics Instruction         Prophylaxis of Recurrence                  Ther Ex         Head Retraction Supine from right SB 10x5\" TID HEP        Head Retraction Sitting         C EXT AROM         C ROT AROM         C SB AROM         C FLEX AROM         T EXT AROM    "      Levator Scapula Stretch         Pectoral Stretch Standing         Pectoral Stretch Supine         Hoist Row: S         LAT Pulldown         TB High Row         TB Mid Row         TB Low Row         TB ER                  Ther Activity         Lifting          Carrying         Reaching                  Modalities         HP         Traction

## 2024-08-26 ENCOUNTER — EVALUATION (OUTPATIENT)
Dept: PHYSICAL THERAPY | Facility: CLINIC | Age: 69
End: 2024-08-26
Payer: MEDICARE

## 2024-08-26 DIAGNOSIS — M99.01 CERVICOTHORACIC SOMATIC DYSFUNCTION: Primary | ICD-10-CM

## 2024-08-26 PROCEDURE — 97162 PT EVAL MOD COMPLEX 30 MIN: CPT | Performed by: PHYSICAL THERAPIST

## 2024-08-26 PROCEDURE — 97112 NEUROMUSCULAR REEDUCATION: CPT | Performed by: PHYSICAL THERAPIST

## 2024-08-26 PROCEDURE — 97535 SELF CARE MNGMENT TRAINING: CPT | Performed by: PHYSICAL THERAPIST

## 2024-08-29 ENCOUNTER — OFFICE VISIT (OUTPATIENT)
Dept: PHYSICAL THERAPY | Facility: CLINIC | Age: 69
End: 2024-08-29
Payer: MEDICARE

## 2024-08-29 DIAGNOSIS — M99.01 CERVICOTHORACIC SOMATIC DYSFUNCTION: Primary | ICD-10-CM

## 2024-08-29 PROCEDURE — 97535 SELF CARE MNGMENT TRAINING: CPT | Performed by: PHYSICAL THERAPIST

## 2024-08-29 NOTE — PROGRESS NOTES
"Daily Note     Today's date: 2024  Patient name: Jovany Little  : 1955  MRN: [id number]  Referring provider: Jacqueline Ventura DO  Dx:   Encounter Diagnosis     ICD-10-CM    1. Cervicothoracic somatic dysfunction  M99.01                      Subjective: Patient states that he has less difficulty falling asleep. His pain is more in the right shoulder blade and less in the right arm. His right arm also tingles less.      Objective: Cervical retraction AROM is increased and is less painful. Right rotation is more limited than left rotation due to right scapular pain.      Assessment: Retraction and right rotation AROM were increased and were less painful after treatment.       Plan: Continue per plan of care.         Precautions: None      Manuals        STM         P/A Mob         Upper Cervical Traction                  Neuro Re-Ed         3 Position Prone Scapular Retraction          Swiss Ball Arm Raises         Sitting Posture Correction with Roll RK Review       Centralization Concepts RK Review       Body Mechanics Instruction for Lifting         Disc Mechanics Instruction         Prophylaxis of Recurrence                  Ther Ex         Head Retraction Supine from right SB 10x5\" TID HEP DC       Head Retraction Sitting  10x5\" TID HEP       C EXT AROM         C Right ROT AROM  10x5\" TID HEP       C SB AROM         C FLEX AROM         T EXT AROM         Levator Scapula Stretch         Pectoral Stretch Standing         Pectoral Stretch Supine         Hoist Row: S         LAT Pulldown         TB High Row         TB Mid Row         TB Low Row         TB ER                  Ther Activity         Lifting          Carrying         Reaching                  Modalities         HP         Traction                       "

## 2024-09-03 ENCOUNTER — OFFICE VISIT (OUTPATIENT)
Dept: PHYSICAL THERAPY | Facility: CLINIC | Age: 69
End: 2024-09-03
Payer: MEDICARE

## 2024-09-03 DIAGNOSIS — M99.01 CERVICOTHORACIC SOMATIC DYSFUNCTION: Primary | ICD-10-CM

## 2024-09-03 PROCEDURE — 97110 THERAPEUTIC EXERCISES: CPT | Performed by: PHYSICAL THERAPIST

## 2024-09-03 PROCEDURE — 97140 MANUAL THERAPY 1/> REGIONS: CPT | Performed by: PHYSICAL THERAPIST

## 2024-09-03 PROCEDURE — 97535 SELF CARE MNGMENT TRAINING: CPT | Performed by: PHYSICAL THERAPIST

## 2024-09-03 NOTE — PROGRESS NOTES
"Daily Note     Today's date: 9/3/2024  Patient name: Jovany Little  : 1955  MRN: [id number]  Referring provider: Jacqueline Ventura DO  Dx:   Encounter Diagnosis     ICD-10-CM    1. Cervicothoracic somatic dysfunction  M99.01                      Subjective: Right armpit and arm pain has diminished in intensity and in frequency. Right arm tingling is also intermittent.  Turning his neck to the left for a sustained period of time was uncomfortable over the weekend. Sleeping on his back is best, and he has less difficulty falling asleep. His HEP seems to be helping.      Objective: Right SB produces right arm tingling, retraction produces right axilla pain, extension produces right scapula pain.      Assessment: After treatment all symptoms were improved. Cervical right sidebending did not produce tingling, and right rotation AROM was increased.      Plan: Progress treatment as tolerated.       Precautions: None      Manuals 8/26 8/29 9/3      STM right mid thoracic spine   RK      P/A Mob                  Neuro Re-Ed         Sitting Posture Correction with Roll RK Review       Centralization Concepts RK Review       Disc Mechanics Instruction         Prophylaxis of Recurrence                  Ther Ex         Head Retraction Supine from right SB 10x5\" TID HEP DC       Head Retraction Sitting  10x5\" TID HEP 10x5\" with OP TID HEP      C EXT AROM   10x5\" with strap  TID HEP      C Right ROT AROM  10x5\" TID HEP 10x5\"      C SB AROM   5x5\"      C FLEX AROM   5x5\"      T EXT AROM         Levator Scapula Stretch         Pectoral Stretch Standing         Pectoral Stretch Supine         Hoist Row: S         LAT Pulldown         TB High Row         TB Mid Row         TB Low Row         TB ER                  Ther Activity         Lifting          Carrying         Reaching                  Modalities                                  "

## 2024-09-05 ENCOUNTER — OFFICE VISIT (OUTPATIENT)
Dept: PHYSICAL THERAPY | Facility: CLINIC | Age: 69
End: 2024-09-05
Payer: MEDICARE

## 2024-09-05 DIAGNOSIS — M99.01 CERVICOTHORACIC SOMATIC DYSFUNCTION: Primary | ICD-10-CM

## 2024-09-05 PROCEDURE — 97140 MANUAL THERAPY 1/> REGIONS: CPT | Performed by: PHYSICAL THERAPIST

## 2024-09-05 PROCEDURE — 97110 THERAPEUTIC EXERCISES: CPT | Performed by: PHYSICAL THERAPIST

## 2024-09-05 NOTE — PROGRESS NOTES
"Daily Note     Today's date: 2024  Patient name: Jovany Little  : 1955  MRN: [id number]  Referring provider: Jacqueline Ventura DO  Dx:   Encounter Diagnosis     ICD-10-CM    1. Cervicothoracic somatic dysfunction  M99.01                      Subjective: Patient was able to walk 2 miles and clean his deck furniture without any worsening of symptoms. His right armpit discomfort is less frequent. He gets tingling occasionally in inner forearm, especially when shaving.      Objective: Cervical right rotation and right sidebending AROM produce right inner arm parasthesias immediately.      Assessment: Tolerated treatment well. Right sidebending and right rotation produce parasthsias to a lesser dgreee, and require a short period of time to do so. Patient exhibited good technique with therapeutic exercises and would benefit from continued PT      Plan: Progress treatment as tolerated.       Precautions: None      Manuals 8/26 8/29 9/3 9/5     STM right mid thoracic spine   RK RK     P/A Mob Right Mid thoracic spine sitting    RK     Right Mid thoracic Extension Snag sitting    RK     Neuro Re-Ed         Sitting Posture Correction with Roll RK Review       Centralization Concepts RK Review       Disc Mechanics Instruction         Prophylaxis of Recurrence                  Ther Ex         Head Retraction Supine from right SB 10x5\" TID HEP DC       Head Retraction Sitting  10x5\" TID HEP 10x5\" with OP TID HEP 10x5\" with OP     C EXT AROM   10x5\" with strap  TID HEP 10x5\" with strap     C Right ROT AROM  10x5\" TID HEP 10x5\" 5x5\"     C SB AROM   5x5\" Right 5x5\"     C FLEX AROM   5x5\"      T EXT AROM         Levator Scapula Stretch         Pectoral Stretch Standing         Pectoral Stretch Supine         Hoist Row: S         LAT Pulldown         TB High Row         TB Mid Row         TB Low Row         TB ER                  Ther Activity         Lifting          Carrying         Reaching                "   Modalities

## 2024-09-09 ENCOUNTER — OFFICE VISIT (OUTPATIENT)
Dept: PHYSICAL THERAPY | Facility: CLINIC | Age: 69
End: 2024-09-09
Payer: MEDICARE

## 2024-09-09 ENCOUNTER — APPOINTMENT (OUTPATIENT)
Dept: LAB | Facility: MEDICAL CENTER | Age: 69
End: 2024-09-09
Payer: MEDICARE

## 2024-09-09 DIAGNOSIS — M99.01 CERVICOTHORACIC SOMATIC DYSFUNCTION: Primary | ICD-10-CM

## 2024-09-09 DIAGNOSIS — E11.9 TYPE 2 DIABETES MELLITUS WITHOUT COMPLICATION, WITHOUT LONG-TERM CURRENT USE OF INSULIN (HCC): ICD-10-CM

## 2024-09-09 LAB
ALBUMIN SERPL BCG-MCNC: 4.4 G/DL (ref 3.5–5)
ALP SERPL-CCNC: 68 U/L (ref 34–104)
ALT SERPL W P-5'-P-CCNC: 27 U/L (ref 7–52)
ANION GAP SERPL CALCULATED.3IONS-SCNC: 9 MMOL/L (ref 4–13)
AST SERPL W P-5'-P-CCNC: 21 U/L (ref 13–39)
BILIRUB SERPL-MCNC: 0.65 MG/DL (ref 0.2–1)
BUN SERPL-MCNC: 24 MG/DL (ref 5–25)
CALCIUM SERPL-MCNC: 10.4 MG/DL (ref 8.4–10.2)
CHLORIDE SERPL-SCNC: 102 MMOL/L (ref 96–108)
CO2 SERPL-SCNC: 29 MMOL/L (ref 21–32)
CREAT SERPL-MCNC: 1.06 MG/DL (ref 0.6–1.3)
CREAT UR-MCNC: 45.2 MG/DL
EST. AVERAGE GLUCOSE BLD GHB EST-MCNC: 140 MG/DL
GFR SERPL CREATININE-BSD FRML MDRD: 71 ML/MIN/1.73SQ M
GLUCOSE P FAST SERPL-MCNC: 106 MG/DL (ref 65–99)
HBA1C MFR BLD: 6.5 %
MICROALBUMIN UR-MCNC: <7 MG/L
POTASSIUM SERPL-SCNC: 3.8 MMOL/L (ref 3.5–5.3)
PROT SERPL-MCNC: 7 G/DL (ref 6.4–8.4)
SODIUM SERPL-SCNC: 140 MMOL/L (ref 135–147)

## 2024-09-09 PROCEDURE — 97110 THERAPEUTIC EXERCISES: CPT | Performed by: PHYSICAL THERAPIST

## 2024-09-09 PROCEDURE — 82570 ASSAY OF URINE CREATININE: CPT

## 2024-09-09 PROCEDURE — 97140 MANUAL THERAPY 1/> REGIONS: CPT | Performed by: PHYSICAL THERAPIST

## 2024-09-09 PROCEDURE — 97112 NEUROMUSCULAR REEDUCATION: CPT | Performed by: PHYSICAL THERAPIST

## 2024-09-09 PROCEDURE — 80053 COMPREHEN METABOLIC PANEL: CPT

## 2024-09-09 PROCEDURE — 82043 UR ALBUMIN QUANTITATIVE: CPT

## 2024-09-09 PROCEDURE — 36415 COLL VENOUS BLD VENIPUNCTURE: CPT

## 2024-09-09 PROCEDURE — 83036 HEMOGLOBIN GLYCOSYLATED A1C: CPT

## 2024-09-09 NOTE — PROGRESS NOTES
"Daily Note     Today's date: 2024  Patient name: Jovany Little  : 1955  MRN: [id number]  Referring provider: Jacqueline Ventura DO  Dx:   Encounter Diagnosis     ICD-10-CM    1. Cervicothoracic somatic dysfunction  M99.01                      Subjective: Patient has right arm tingling mostly at the right elbow. Rarely does he get right armpit discomfort. Right shoulder blade discomfort has diminished. HEP is going well. Shaving and sitting with the right arm on the armrest as a passenger in a vehicle produce right elbow and forearm parastheias.      Objective: See treatment diary below      Assessment: Tolerated treatment well, having less pain and parasthesias, and improved right rotation AROM after treatmwnt. Patient would benefit from continued PT      Plan: Progress treatment as tolerated.       Precautions: None      Manuals 8/26 8/29 9/3 9/5 9/9    STM right mid thoracic spine   RK RK RK    P/A Mob Right Mid thoracic spine sitting    RK RK    Right Mid thoracic Extension Snag sitting    RK RK    Neuro Re-Ed         Sitting Posture Correction with Roll RK Review       Centralization Concepts RK Review   RK    Disc Mechanics Instruction     RK    Prophylaxis of Recurrence                  Ther Ex         Head Retraction Supine from right SB 10x5\" TID HEP DC       Head Retraction Sitting  10x5\" TID HEP 10x5\" with OP TID HEP 10x5\" with OP 10x5\" with OP    C EXT AROM   10x5\" with strap  TID HEP 10x5\" with strap 10x5\" with strap    C Right ROT AROM  10x5\" TID HEP 10x5\" 5x5\" 5x5\"    C SB AROM   5x5\" Right 5x5\" Right 5x5\"    C FLEX AROM   5x5\"      T EXT AROM         Levator Scapula Stretch         Pectoral Stretch Standing         Pectoral Stretch Supine         Hoist Row: S         LAT Pulldown         TB High Row         TB Mid Row         TB Low Row         TB ER                  Ther Activity         Lifting          Carrying         Reaching                  Modalities                         "

## 2024-09-12 ENCOUNTER — OFFICE VISIT (OUTPATIENT)
Dept: PHYSICAL THERAPY | Facility: CLINIC | Age: 69
End: 2024-09-12
Payer: MEDICARE

## 2024-09-12 ENCOUNTER — OFFICE VISIT (OUTPATIENT)
Dept: FAMILY MEDICINE CLINIC | Facility: CLINIC | Age: 69
End: 2024-09-12
Payer: MEDICARE

## 2024-09-12 VITALS
SYSTOLIC BLOOD PRESSURE: 126 MMHG | BODY MASS INDEX: 34.66 KG/M2 | RESPIRATION RATE: 18 BRPM | HEART RATE: 79 BPM | OXYGEN SATURATION: 97 % | DIASTOLIC BLOOD PRESSURE: 78 MMHG | HEIGHT: 69 IN | WEIGHT: 234 LBS | TEMPERATURE: 97.9 F

## 2024-09-12 DIAGNOSIS — Z79.4 TYPE 2 DIABETES MELLITUS WITH CHRONIC KIDNEY DISEASE, WITH LONG-TERM CURRENT USE OF INSULIN, UNSPECIFIED CKD STAGE (HCC): ICD-10-CM

## 2024-09-12 DIAGNOSIS — E11.22 TYPE 2 DIABETES MELLITUS WITH CHRONIC KIDNEY DISEASE, WITH LONG-TERM CURRENT USE OF INSULIN, UNSPECIFIED CKD STAGE (HCC): ICD-10-CM

## 2024-09-12 DIAGNOSIS — Z00.00 MEDICARE ANNUAL WELLNESS VISIT, SUBSEQUENT: Primary | ICD-10-CM

## 2024-09-12 DIAGNOSIS — M99.01 CERVICOTHORACIC SOMATIC DYSFUNCTION: Primary | ICD-10-CM

## 2024-09-12 DIAGNOSIS — E83.52 HYPERCALCEMIA: ICD-10-CM

## 2024-09-12 PROCEDURE — 97110 THERAPEUTIC EXERCISES: CPT | Performed by: PHYSICAL THERAPIST

## 2024-09-12 PROCEDURE — 97140 MANUAL THERAPY 1/> REGIONS: CPT | Performed by: PHYSICAL THERAPIST

## 2024-09-12 PROCEDURE — G0439 PPPS, SUBSEQ VISIT: HCPCS | Performed by: INTERNAL MEDICINE

## 2024-09-12 NOTE — ASSESSMENT & PLAN NOTE
Pt will exercise and continue adequate water intake/lo carb diet   Flu shot generally recommended in October and consider RSV since with his grandchildren often

## 2024-09-12 NOTE — PROGRESS NOTES
"Daily Note     Today's date: 2024  Patient name: Jovany Little  : 1955  MRN: [id number]  Referring provider: Jacqueline Ventura DO  Dx:   Encounter Diagnosis     ICD-10-CM    1. Cervicothoracic somatic dysfunction  M99.01                      Subjective: Patient states that he now has no difficulty falling asleep. He was able to dig 2 holes and plant 2 trees, which did require some heavy lifting, without any worsening of symptoms. Reaching forward will still create some right arm pain and parasthesias, but not as intensely.      Objective: Sustained cervical right SB, right ROT, and EXT eventually produce some mild right arm parasthesias, as does right shoulder forward reaching. A longer period of time was needed for symptoms to begin.      Assessment: Sustained cervical right SB, right ROT, and EXT were less provocative of parsathesias after treatment.      Plan: Progress treatment as tolerated.       Precautions: None      Manuals 8/26 8/29 9/3 9/5 9/9 9/12   STM right mid thoracic spine   RK RK RK RK   P/A Mob Right Mid thoracic spine sitting    RK RK RK   Right Mid thoracic Extension Snag sitting    RK RK RK   Neuro Re-Ed         Sitting Posture Correction with Roll RK Review       Centralization Concepts RK Review   RK    Disc Mechanics Instruction     RK    Prophylaxis of Recurrence                  Ther Ex         Head Retraction Supine from right SB 10x5\" TID HEP DC       Head Retraction Sitting  10x5\" TID HEP 10x5\" with OP TID HEP 10x5\" with OP 10x5\" with OP 10x5\" with OP   C EXT AROM   10x5\" with strap  TID HEP 10x5\" with strap 10x5\" with strap 10x5\" with strap   C Right ROT AROM  10x5\" TID HEP 10x5\" 5x5\" 5x5\" 2x30\"   C SB AROM   5x5\" Right 5x5\" Right 5x5\" Right 2x30\"   C FLEX AROM   5x5\"      T EXT AROM sitting      10x5\" TID HEP   Pectoral Stretch Standing         Pectoral Stretch Supine         Hoist Row: S         LAT Pulldown         TB High Row         TB Mid Row         TB Low Row  "        TB ER                  Ther Activity                  Modalities

## 2024-09-12 NOTE — PATIENT INSTRUCTIONS
Medicare Preventive Visit Patient Instructions  Thank you for completing your Welcome to Medicare Visit or Medicare Annual Wellness Visit today. Your next wellness visit will be due in one year (9/13/2025).  The screening/preventive services that you may require over the next 5-10 years are detailed below. Some tests may not apply to you based off risk factors and/or age. Screening tests ordered at today's visit but not completed yet may show as past due. Also, please note that scanned in results may not display below.  Preventive Screenings:  Service Recommendations Previous Testing/Comments   Colorectal Cancer Screening  Colonoscopy    Fecal Occult Blood Test (FOBT)/Fecal Immunochemical Test (FIT)  Fecal DNA/Cologuard Test  Flexible Sigmoidoscopy Age: 45-75 years old   Colonoscopy: every 10 years (May be performed more frequently if at higher risk)  OR  FOBT/FIT: every 1 year  OR  Cologuard: every 3 years  OR  Sigmoidoscopy: every 5 years  Screening may be recommended earlier than age 45 if at higher risk for colorectal cancer. Also, an individualized decision between you and your healthcare provider will decide whether screening between the ages of 76-85 would be appropriate. Colonoscopy: 05/05/2020  FOBT/FIT: Not on file  Cologuard: Not on file  Sigmoidoscopy: Not on file          Prostate Cancer Screening Individualized decision between patient and health care provider in men between ages of 55-69   Medicare will cover every 12 months beginning on the day after your 50th birthday PSA: 1.43 ng/mL           Hepatitis C Screening Once for adults born between 1945 and 1965  More frequently in patients at high risk for Hepatitis C Hep C Antibody: 11/02/2020        Diabetes Screening 1-2 times per year if you're at risk for diabetes or have pre-diabetes Fasting glucose: 106 mg/dL (9/9/2024)  A1C: 6.5 % (9/9/2024)      Cholesterol Screening Once every 5 years if you don't have a lipid disorder. May order more often  based on risk factors. Lipid panel: 02/21/2023         Other Preventive Screenings Covered by Medicare:  Abdominal Aortic Aneurysm (AAA) Screening: covered once if your at risk. You're considered to be at risk if you have a family history of AAA or a male between the age of 65-75 who smoking at least 100 cigarettes in your lifetime.  Lung Cancer Screening: covers low dose CT scan once per year if you meet all of the following conditions: (1) Age 55-77; (2) No signs or symptoms of lung cancer; (3) Current smoker or have quit smoking within the last 15 years; (4) You have a tobacco smoking history of at least 20 pack years (packs per day x number of years you smoked); (5) You get a written order from a healthcare provider.  Glaucoma Screening: covered annually if you're considered high risk: (1) You have diabetes OR (2) Family history of glaucoma OR (3)  aged 50 and older OR (4)  American aged 65 and older  Osteoporosis Screening: covered every 2 years if you meet one of the following conditions: (1) Have a vertebral abnormality; (2) On glucocorticoid therapy for more than 3 months; (3) Have primary hyperparathyroidism; (4) On osteoporosis medications and need to assess response to drug therapy.  HIV Screening: covered annually if you're between the age of 15-65. Also covered annually if you are younger than 15 and older than 65 with risk factors for HIV infection. For pregnant patients, it is covered up to 3 times per pregnancy.    Immunizations:  Immunization Recommendations   Influenza Vaccine Annual influenza vaccination during flu season is recommended for all persons aged >= 6 months who do not have contraindications   Pneumococcal Vaccine   * Pneumococcal conjugate vaccine = PCV13 (Prevnar 13), PCV15 (Vaxneuvance), PCV20 (Prevnar 20)  * Pneumococcal polysaccharide vaccine = PPSV23 (Pneumovax) Adults 19-63 yo with certain risk factors or if 65+ yo  If never received any pneumonia vaccine:  recommend Prevnar 20 (PCV20)  Give PCV20 if previously received 1 dose of PCV13 or PPSV23   Hepatitis B Vaccine 3 dose series if at intermediate or high risk (ex: diabetes, end stage renal disease, liver disease)   Respiratory syncytial virus (RSV) Vaccine - COVERED BY MEDICARE PART D  * RSVPreF3 (Arexvy) CDC recommends that adults 60 years of age and older may receive a single dose of RSV vaccine using shared clinical decision-making (SCDM)   Tetanus (Td) Vaccine - COST NOT COVERED BY MEDICARE PART B Following completion of primary series, a booster dose should be given every 10 years to maintain immunity against tetanus. Td may also be given as tetanus wound prophylaxis.   Tdap Vaccine - COST NOT COVERED BY MEDICARE PART B Recommended at least once for all adults. For pregnant patients, recommended with each pregnancy.   Shingles Vaccine (Shingrix) - COST NOT COVERED BY MEDICARE PART B  2 shot series recommended in those 19 years and older who have or will have weakened immune systems or those 50 years and older     Health Maintenance Due:      Topic Date Due   • Colorectal Cancer Screening  05/05/2025   • Hepatitis C Screening  Completed     Immunizations Due:      Topic Date Due   • Influenza Vaccine (1) 09/01/2024     Advance Directives   What are advance directives?  Advance directives are legal documents that state your wishes and plans for medical care. These plans are made ahead of time in case you lose your ability to make decisions for yourself. Advance directives can apply to any medical decision, such as the treatments you want, and if you want to donate organs.   What are the types of advance directives?  There are many types of advance directives, and each state has rules about how to use them. You may choose a combination of any of the following:  Living will:  This is a written record of the treatment you want. You can also choose which treatments you do not want, which to limit, and which to stop  at a certain time. This includes surgery, medicine, IV fluid, and tube feedings.   Durable power of  for healthcare (DPAHC):  This is a written record that states who you want to make healthcare choices for you when you are unable to make them for yourself. This person, called a proxy, is usually a family member or a friend. You may choose more than 1 proxy.  Do not resuscitate (DNR) order:  A DNR order is used in case your heart stops beating or you stop breathing. It is a request not to have certain forms of treatment, such as CPR. A DNR order may be included in other types of advance directives.  Medical directive:  This covers the care that you want if you are in a coma, near death, or unable to make decisions for yourself. You can list the treatments you want for each condition. Treatment may include pain medicine, surgery, blood transfusions, dialysis, IV or tube feedings, and a ventilator (breathing machine).  Values history:  This document has questions about your views, beliefs, and how you feel and think about life. This information can help others choose the care that you would choose.  Why are advance directives important?  An advance directive helps you control your care. Although spoken wishes may be used, it is better to have your wishes written down. Spoken wishes can be misunderstood, or not followed. Treatments may be given even if you do not want them. An advance directive may make it easier for your family to make difficult choices about your care.   Weight Management   Why it is important to manage your weight:  Being overweight increases your risk of health conditions such as heart disease, high blood pressure, type 2 diabetes, and certain types of cancer. It can also increase your risk for osteoarthritis, sleep apnea, and other respiratory problems. Aim for a slow, steady weight loss. Even a small amount of weight loss can lower your risk of health problems.  How to lose weight safely:   A safe and healthy way to lose weight is to eat fewer calories and get regular exercise. You can lose up about 1 pound a week by decreasing the number of calories you eat by 500 calories each day.   Healthy meal plan for weight management:  A healthy meal plan includes a variety of foods, contains fewer calories, and helps you stay healthy. A healthy meal plan includes the following:  Eat whole-grain foods more often.  A healthy meal plan should contain fiber. Fiber is the part of grains, fruits, and vegetables that is not broken down by your body. Whole-grain foods are healthy and provide extra fiber in your diet. Some examples of whole-grain foods are whole-wheat breads and pastas, oatmeal, brown rice, and bulgur.  Eat a variety of vegetables every day.  Include dark, leafy greens such as spinach, kale, jacqui greens, and mustard greens. Eat yellow and orange vegetables such as carrots, sweet potatoes, and winter squash.   Eat a variety of fruits every day.  Choose fresh or canned fruit (canned in its own juice or light syrup) instead of juice. Fruit juice has very little or no fiber.  Eat low-fat dairy foods.  Drink fat-free (skim) milk or 1% milk. Eat fat-free yogurt and low-fat cottage cheese. Try low-fat cheeses such as mozzarella and other reduced-fat cheeses.  Choose meat and other protein foods that are low in fat.  Choose beans or other legumes such as split peas or lentils. Choose fish, skinless poultry (chicken or turkey), or lean cuts of red meat (beef or pork). Before you cook meat or poultry, cut off any visible fat.   Use less fat and oil.  Try baking foods instead of frying them. Add less fat, such as margarine, sour cream, regular salad dressing and mayonnaise to foods. Eat fewer high-fat foods. Some examples of high-fat foods include french fries, doughnuts, ice cream, and cakes.  Eat fewer sweets.  Limit foods and drinks that are high in sugar. This includes candy, cookies, regular soda, and  sweetened drinks.  Exercise:  Exercise at least 30 minutes per day on most days of the week. Some examples of exercise include walking, biking, dancing, and swimming. You can also fit in more physical activity by taking the stairs instead of the elevator or parking farther away from stores. Ask your healthcare provider about the best exercise plan for you.      © Copyright [employer] 2018 Information is for End User's use only and may not be sold, redistributed or otherwise used for commercial purposes. All illustrations and images included in CareNotes® are the copyrighted property of A.D.A[employer]M., Inc. or [employer]

## 2024-09-12 NOTE — PROGRESS NOTES
Ambulatory Visit  Name: Jovany Little      : 1955      MRN: [id number]  Encounter Provider: Jacqueline Ventura DO  Encounter Date: 2024   Encounter department: [facility]    Assessment & Plan  Type 2 diabetes mellitus with chronic kidney disease, with long-term current use of insulin, unspecified CKD stage (HCC)    Lab Results   Component Value Date    HGBA1C 6.5 (H) 2024       Orders:  •  Comprehensive metabolic panel; Future  •  Hemoglobin A1C; Future  •  Lipid Panel with Direct LDL reflex; Future    Medicare annual wellness visit, subsequent  Pt will exercise and continue adequate water intake/lo carb diet   Flu shot generally recommended in October and consider RSV since with his grandchildren often        Hypercalcemia    Orders:  •  PTH, intact; Future      Depression Screening and Follow-up Plan: Patient was screened for depression during today's encounter. They screened negative with a PHQ-2 score of 0.    Preventive health issues were discussed with patient, and age appropriate screening tests were ordered as noted in patient's After Visit Summary. Personalized health advice and appropriate referrals for health education or preventive services given if needed, as noted in patient's After Visit Summary.    History of Present Illness     HPI   Pt doing ok in general Knee sxs improved and back sxs improving with PT He is walking for exercise Diet not as consistent so was relieved A1c did not increase but will be working on improving diet He remains consistent with ewater intake and tracks that which has been helpful  Patient Care Team:  Jacqueline Ventura DO as PCP - General (Internal Medicine)  Teddy Florez DPM (Podiatry)    Review of Systems   Constitutional:  Negative for chills and fever.   HENT: Negative.     Eyes:  Negative for visual disturbance.   Respiratory:  Negative for cough and shortness of breath.    Cardiovascular:  Negative for chest pain, palpitations  and leg swelling.   Gastrointestinal: Negative.    Genitourinary: Negative.    Musculoskeletal:  Positive for arthralgias and back pain.        Sxs improving with PT    Neurological:  Negative for dizziness, light-headedness and headaches.   Psychiatric/Behavioral:  Negative for sleep disturbance. The patient is not nervous/anxious.      Medical History Reviewed by provider this encounter:  Tobacco  Allergies  Meds  Problems  Med Hx  Surg Hx  Fam Hx       Annual Wellness Visit Questionnaire   Jovany is here for his Subsequent Wellness visit. Last Medicare Wellness visit information reviewed, patient interviewed, no change since last AWV.     Health Risk Assessment:   Patient rates overall health as good. Patient feels that their physical health rating is slightly better. Patient is very satisfied with their life. Eyesight was rated as same. Hearing was rated as same. Patient feels that their emotional and mental health rating is same. Patients states they are never, rarely angry. Patient states they are never, rarely unusually tired/fatigued. Pain experienced in the last 7 days has been some. Patient's pain rating has been 4/10. Patient states that he has experienced weight loss or gain in last 6 months.     Depression Screening:   PHQ-2 Score: 0      Fall Risk Screening:   In the past year, patient has experienced: no history of falling in past year      Home Safety:  Patient has trouble with stairs inside or outside of their home. Patient has working smoke alarms and has no working carbon monoxide detector. Home safety hazards include: none.     Nutrition:   Current diet is Regular.     Medications:   Patient is currently taking over-the-counter supplements. OTC medications include: see medication list. Patient is able to manage medications.     Activities of Daily Living (ADLs)/Instrumental Activities of Daily Living (IADLs):   Walk and transfer into and out of bed and chair?: Yes  Dress and groom  yourself?: Yes    Bathe or shower yourself?: Yes    Feed yourself? Yes  Do your laundry/housekeeping?: Yes  Manage your money, pay your bills and track your expenses?: Yes  Make your own meals?: Yes    Do your own shopping?: Yes    Previous Hospitalizations:   Any hospitalizations or ED visits within the last 12 months?: No      Advance Care Planning:   Living will: Yes    Durable POA for healthcare: Yes    Advanced directive: Yes    End of Life Decisions reviewed with patient: Yes    Provider agrees with end of life decisions: Yes      Cognitive Screening:   Provider or family/friend/caregiver concerned regarding cognition?: No    PREVENTIVE SCREENINGS      Cardiovascular Screening:    General: History Lipid Disorder and Risks and Benefits Discussed    Due for: Lipid Panel      Diabetes Screening:     General: History Diabetes and Screening Current    Due for: Blood Glucose      Colorectal Cancer Screening:     General: Screening Current      Prostate Cancer Screening:    General: Screening Current      Osteoporosis Screening:    General: Screening Not Indicated      Abdominal Aortic Aneurysm (AAA) Screening:    Risk factors include: age between 65-74 yo and tobacco use        General: Screening Current      Lung Cancer Screening:     General: Screening Not Indicated      Hepatitis C Screening:    General: Screening Current    Screening, Brief Intervention, and Referral to Treatment (SBIRT)    Screening  Typical number of drinks in a day: 1  Typical number of drinks in a week: 3  Interpretation: Low risk drinking behavior.    AUDIT-C Screenin) How often did you have a drink containing alcohol in the past year? 2 to 3 times a week  2) How many drinks did you have on a typical day when you were drinking in the past year? 1 to 2  3) How often did you have 6 or more drinks on one occasion in the past year? never    AUDIT-C Score: 3  Interpretation: Score 0-3 (male): Negative screen for alcohol misuse    Single  "Item Drug Screening:  How often have you used an illegal drug (including marijuana) or a prescription medication for non-medical reasons in the past year? never    Single Item Drug Screen Score: 0  Interpretation: Negative screen for possible drug use disorder    Brief Intervention  Alcohol & drug use screenings were reviewed. No concerns regarding substance use disorder identified.     Other Counseling Topics:   Regular weightbearing exercise.     Social Determinants of Health     Financial Resource Strain: Low Risk  (3/1/2024)    Received from [facility]    Overall Financial Resource Strain (CARDIA)    • Difficulty of Paying Living Expenses: Not hard at all   Food Insecurity: No Food Insecurity (9/7/2024)    Hunger Vital Sign    • Worried About Running Out of Food in the Last Year: Never true    • Ran Out of Food in the Last Year: Never true   Transportation Needs: No Transportation Needs (9/7/2024)    PRAPARE - Transportation    • Lack of Transportation (Medical): No    • Lack of Transportation (Non-Medical): No   Housing Stability: Low Risk  (9/7/2024)    Housing Stability Vital Sign    • Unable to Pay for Housing in the Last Year: No    • Number of Times Moved in the Last Year: 0    • Homeless in the Last Year: No   Utilities: Not At Risk (9/7/2024)    [facility] Utilities    • Threatened with loss of utilities: No     No results found.    Objective     /78   Pulse 79   Temp 97.9 °F (36.6 °C) (Temporal)   Resp 18   Ht 5' 9\" (1.753 m)   Wt 106 kg (234 lb)   SpO2 97%   BMI 34.56 kg/m²     Physical Exam    "

## 2024-09-12 NOTE — PROGRESS NOTES
PT Re-Evaluation     Today's date: 2024  Patient name: Jovany Little  : 1955  MRN: [id number]  Referring provider: Jacqueline Ventura DO  Dx:   Encounter Diagnosis     ICD-10-CM    1. Cervicothoracic somatic dysfunction  M99.01                        Assessment    Assessment details:   CURRENT FUNCTIONAL STATUS    Sitting tolerance 60-90 minutes.    Ability to turn neck right:  Fair/Good  Ability to look up: Fair   Ability to reach forward: Fair/Good  Lifting tolerance 30 lbs.    SHORT TERM GOALS (2 WEEKS)    Increase cervical spine AROM 10 % in all restricted planes.  Improve sitting posture.  Decrease pain to 0-2/10.  Independent with HEP.  Ability to turn neck right:  Good  Ability to look up: Fair/Good   Ability to reach forward: Good  Lifting tolerance 35-40 lbs.    LONG TERM GOALS (DISCHARGE)    Cervical Spine AROM: WFL in all planes.-partially met  Sitting posture: Good-partially met  Decrease pain to 0-2/10.-partially met  Independent with HEP.-met  Sitting tolerance 60 minutes.-met    Ability to turn neck right:  Good-partially met  Ability to look up: Good-partially met  Ability to reach forward: Good-partially met  Lifting tolerance 40 lbs.-partially met      Understanding of Dx/Px/POC: good     Prognosis: good    Goals  See assessment details above.      Plan  Patient would benefit from: skilled physical therapy  Planned modality interventions: cryotherapy and thermotherapy: hydrocollator packs    Planned therapy interventions: manual therapy, neuromuscular re-education, therapeutic activities and home exercise program    Frequency: 2x week  Duration in weeks: 4  Plan details: The patient has shown improvement in PT demonstrating decreased pain, increased range of motion, improved posture, and increased tolerance to activity. The patient continues to present with pain, decreased ROM, postural deficits, and decreased tolerance to activity. The patient would benefit from continued skilled  "PT services to address these issues and to maximize function. The patient will also continue performing their HEP.        Subjective Evaluation    History of Present Illness  Mechanism of injury: Subjective: The patient's pain and parasthesias have decreased in intensity and in frequency. He also has an improved tolerance for neck movements, sleeping, and lifting. Reaching forward and looking up have improved, but they are still limited by mild pain and parasthesias. Most of his symptoms are localized to the right elbow at this time.  Patient Goals  Patient goals for therapy: decreased pain and increased motion  Patient goal: Improved ability to sleep, sit, and lift.  Pain  Current pain ratin  At best pain ratin  At worst pain rating: 3        Objective     Postural Observations    Additional Postural Observation Details  Moderate forward head sitting posture.    General Comments:      Cervical/Thoracic Comments  CURRENT OBJECTIVE MEASUREMENTS    Cervical Spine AROM: F=100 %, EXT=50 %, R ROT=75 %, L ROT=80 %, R SB=50 %, L SB=75 %.  Upper Extremity Strength: 5/5 t/o bilaterally except right thumb extension 4/5  Upper Extremity Reflexes: +2 bilaterally  Upper Extremity Sensation: Intact to light touch bilaterally  Sitting Posture: Fair/Good                 Precautions: None        Manuals 9/16 9/19 9/3 9/5 9/9 9/12   STM right mid thoracic spine Right C-T JCT-  RK  Right Upper thoracic spine-  RK RK RK RK RK   P/A Mob Right Mid thoracic spine sitting       RK RK RK   Right Mid thoracic Extension Snag sitting       RK RK RK   Right C-T JCT ext snags RK  Right Upper thoracic spine-  RK           Neuro Re-Ed               Sitting Posture Correction with Roll              Centralization Concepts        RK     Disc Mechanics Instruction         RK     Prophylaxis of Recurrence                               Ther Ex               Head Retraction Sitting 10x5\" with OP 10x5\" with OP 10x5\" with OP TID HEP 10x5\" with OP " "10x5\" with OP 10x5\" with OP   C EXT AROM 10x5\" with strap into right quadrant  10x5\" with strap into right quadrant 10x5\" with strap  TID HEP 10x5\" with strap 10x5\" with strap 10x5\" with strap   C Right ROT AROM 3x30\" 3x30\" 10x5\" 5x5\" 5x5\" 2x30\"   C SB AROM Right 3x30\"  Right 3x30\" 5x5\" Right 5x5\" Right 5x5\" Right 2x30\"   C FLEX AROM     5x5\"         T EXT AROM sitting           10x5\" TID HEP   Hoist Row: S 5    P3 3/10           Hoist LAT Pulldown: S 5    P3 3/10           XO SA Pulldown   P4 3/10           TB: T's    NV           TB: Y's  NV       TB: ER    L2 3/10            UBE for muscular endurance: S 6    L1   5 min           Ther Activity                               Modalities                                                                         "

## 2024-09-12 NOTE — ASSESSMENT & PLAN NOTE
Lab Results   Component Value Date    HGBA1C 6.5 (H) 09/09/2024       Orders:    Comprehensive metabolic panel; Future    Hemoglobin A1C; Future    Lipid Panel with Direct LDL reflex; Future

## 2024-09-16 ENCOUNTER — OFFICE VISIT (OUTPATIENT)
Dept: PHYSICAL THERAPY | Facility: CLINIC | Age: 69
End: 2024-09-16
Payer: MEDICARE

## 2024-09-16 DIAGNOSIS — M99.01 CERVICOTHORACIC SOMATIC DYSFUNCTION: Primary | ICD-10-CM

## 2024-09-16 PROCEDURE — 97140 MANUAL THERAPY 1/> REGIONS: CPT | Performed by: PHYSICAL THERAPIST

## 2024-09-16 PROCEDURE — 97110 THERAPEUTIC EXERCISES: CPT | Performed by: PHYSICAL THERAPIST

## 2024-09-16 NOTE — PROGRESS NOTES
"Daily Note     Today's date: 2024  Patient name: Jovany Little  : 1955  MRN: [id number]  Referring provider: Jacqueline Ventura DO  Dx:   Encounter Diagnosis     ICD-10-CM    1. Cervicothoracic somatic dysfunction  M99.01                      Subjective: Patient states that he now primarily has aching and tingling at the right elbow.  He did have some armpit pain while using a hand saw over the weekend. He has not had any neck or scapular pain.      Objective: Cervical right rotation, sidebending, and extension all produce mild right elbow discomfort and parasthesias.      Assessment: Tolerated treatment well. Patient exhibited good technique with therapeutic exercises. Cervical spine ROM was less provocative of right arm symptoms after MT and exercise.      Plan: Continue per plan of care.      Precautions: None      Manuals 9/16 8/29 9/3 9/5 9/9 9/12   STM right mid thoracic spine Right C-T JCT-  RK  RK RK RK RK   P/A Mob Right Mid thoracic spine sitting    RK RK RK   Right Mid thoracic Extension Snag sitting    RK RK RK   Right C-T JCT ext snags RK        Neuro Re-Ed         Sitting Posture Correction with Roll  Review       Centralization Concepts  Review   RK    Disc Mechanics Instruction     RK    Prophylaxis of Recurrence                  Ther Ex         Head Retraction Supine from right SB  DC       Head Retraction Sitting 10x5\" with OP 10x5\" TID HEP 10x5\" with OP TID HEP 10x5\" with OP 10x5\" with OP 10x5\" with OP   C EXT AROM 10x5\" with strap into right quadrant  10x5\" with strap  TID HEP 10x5\" with strap 10x5\" with strap 10x5\" with strap   C Right ROT AROM 3x30\" 10x5\" TID HEP 10x5\" 5x5\" 5x5\" 2x30\"   C SB AROM Right 3x30\"  5x5\" Right 5x5\" Right 5x5\" Right 2x30\"   C FLEX AROM   5x5\"      T EXT AROM sitting      10x5\" TID HEP   Pectoral Stretch Standing         Hoist Row: S         LAT Pulldown         TB Mid Row         TB Low Row         TB ER                  Ther Activity                "   Modalities

## 2024-09-19 ENCOUNTER — EVALUATION (OUTPATIENT)
Dept: PHYSICAL THERAPY | Facility: CLINIC | Age: 69
End: 2024-09-19
Payer: MEDICARE

## 2024-09-19 DIAGNOSIS — M99.01 CERVICOTHORACIC SOMATIC DYSFUNCTION: Primary | ICD-10-CM

## 2024-09-19 PROCEDURE — 97110 THERAPEUTIC EXERCISES: CPT | Performed by: PHYSICAL THERAPIST

## 2024-09-19 PROCEDURE — 97140 MANUAL THERAPY 1/> REGIONS: CPT | Performed by: PHYSICAL THERAPIST

## 2024-09-23 ENCOUNTER — OFFICE VISIT (OUTPATIENT)
Dept: PHYSICAL THERAPY | Facility: CLINIC | Age: 69
End: 2024-09-23
Payer: MEDICARE

## 2024-09-23 DIAGNOSIS — M99.01 CERVICOTHORACIC SOMATIC DYSFUNCTION: Primary | ICD-10-CM

## 2024-09-23 PROCEDURE — 97140 MANUAL THERAPY 1/> REGIONS: CPT

## 2024-09-23 PROCEDURE — 97110 THERAPEUTIC EXERCISES: CPT

## 2024-09-23 NOTE — PROGRESS NOTES
"Daily Note     Today's date: 2024  Patient name: Jovany Little  : 1955  MRN: [id number]  Referring provider: Jacqueline Ventura DO  Dx:   Encounter Diagnosis     ICD-10-CM    1. Cervicothoracic somatic dysfunction  M99.01                      Subjective: Patient reports no new complaints.      Objective: See treatment diary below      Assessment: Tolerated treatment well.   Patient participated in skilled PT session focused on strengthening, stretching, and ROM.  Patient able to complete exercise program with some mild numbness on the R UE.  Patient had a quick recovery from the numbness.  Patient needed some v.c. for not shrugging shoulders during stretching and exercises.  Patient would continue to benefit from skilled PT interventions to address strengthening, stretching, and ROM. Patient demonstrated fatigue post treatment      Plan: Continue per plan of care.      Precautions: None        Manuals    STM right mid thoracic spine Right C-T JCT-  RK  Right Upper thoracic spine-  RK R Upper thoracic spine CD RK RK RK   P/A Mob Right Mid thoracic spine sitting      RK RK RK   Right Mid thoracic Extension Snag sitting      RK RK RK   Right C-T JCT ext snags RK  Right Upper thoracic spine-  RK          Neuro Re-Ed              Sitting Posture Correction with Roll             Centralization Concepts       RK     Disc Mechanics Instruction        RK     Prophylaxis of Recurrence                             Ther Ex              Head Retraction Sitting 10x5\" with OP 10x5\" with OP 5\" 10x w/OP 10x5\" with OP 10x5\" with OP 10x5\" with OP   C EXT AROM 10x5\" with strap into right quadrant  10x5\" with strap into right quadrant 5\" 10x no strap 10x5\" with strap 10x5\" with strap 10x5\" with strap   C Right ROT AROM 3x30\" 3x30\" 30\" 3x ea 5x5\" 5x5\" 2x30\"   C SB AROM Right 3x30\"  Right 3x30\" 30\" 3x ea Right 5x5\" Right 5x5\" Right 2x30\"   C FLEX AROM              T EXT AROM sitting          " "10x5\" TID HEP   Hoist Row: S 5    P3 3/10 P3 3/10         Hoist LAT Pulldown: S 5    P3 3/10 P3 3/10         XO SA Pulldown   P4 3/10 P4 3/10         TB: T's    NV L2 3/10         TB: Y's  NV NV      TB: ER    L2 3/10 L2 3/10          UBE for muscular endurance: S 6    L1   5 min L1 x 5 min         Ther Activity                             Modalities                                                         "

## 2024-09-26 ENCOUNTER — APPOINTMENT (OUTPATIENT)
Dept: PHYSICAL THERAPY | Facility: CLINIC | Age: 69
End: 2024-09-26
Payer: MEDICARE

## 2024-09-27 DIAGNOSIS — I35.8 AORTIC VALVE SCLEROSIS: ICD-10-CM

## 2024-09-27 DIAGNOSIS — I10 BENIGN ESSENTIAL HYPERTENSION: ICD-10-CM

## 2024-09-27 DIAGNOSIS — E78.2 MIXED HYPERLIPIDEMIA: ICD-10-CM

## 2024-09-27 DIAGNOSIS — E66.09 CLASS 2 OBESITY DUE TO EXCESS CALORIES IN ADULT, UNSPECIFIED BMI, UNSPECIFIED WHETHER SERIOUS COMORBIDITY PRESENT: ICD-10-CM

## 2024-09-27 DIAGNOSIS — I10 ESSENTIAL HYPERTENSION: ICD-10-CM

## 2024-09-27 DIAGNOSIS — E11.9 TYPE 2 DIABETES MELLITUS WITHOUT COMPLICATION, WITHOUT LONG-TERM CURRENT USE OF INSULIN (HCC): ICD-10-CM

## 2024-09-27 DIAGNOSIS — E66.812 CLASS 2 OBESITY DUE TO EXCESS CALORIES IN ADULT, UNSPECIFIED BMI, UNSPECIFIED WHETHER SERIOUS COMORBIDITY PRESENT: ICD-10-CM

## 2024-09-27 RX ORDER — PANTOPRAZOLE SODIUM 40 MG/1
40 TABLET, DELAYED RELEASE ORAL DAILY
Qty: 90 TABLET | Refills: 3 | Status: SHIPPED | OUTPATIENT
Start: 2024-09-27

## 2024-09-27 RX ORDER — ALLOPURINOL 300 MG/1
300 TABLET ORAL DAILY
Qty: 90 TABLET | Refills: 3 | Status: SHIPPED | OUTPATIENT
Start: 2024-09-27

## 2024-09-27 RX ORDER — METFORMIN HCL 500 MG
500 TABLET, EXTENDED RELEASE 24 HR ORAL 2 TIMES DAILY WITH MEALS
Qty: 180 TABLET | Refills: 3 | Status: SHIPPED | OUTPATIENT
Start: 2024-09-27

## 2024-09-27 RX ORDER — LOSARTAN POTASSIUM 50 MG/1
50 TABLET ORAL DAILY
Qty: 90 TABLET | Refills: 3 | Status: SHIPPED | OUTPATIENT
Start: 2024-09-27

## 2024-09-27 RX ORDER — HYDROCHLOROTHIAZIDE 25 MG/1
50 TABLET ORAL DAILY
Qty: 180 TABLET | Refills: 3 | Status: SHIPPED | OUTPATIENT
Start: 2024-09-27

## 2024-09-27 NOTE — TELEPHONE ENCOUNTER
Pt sent opinions.h message stating that Pharmacy is to be changed to [facility].  Preferred pharmacy updated as requested.

## 2024-09-30 ENCOUNTER — OFFICE VISIT (OUTPATIENT)
Dept: PHYSICAL THERAPY | Facility: CLINIC | Age: 69
End: 2024-09-30
Payer: MEDICARE

## 2024-09-30 DIAGNOSIS — M99.01 CERVICOTHORACIC SOMATIC DYSFUNCTION: Primary | ICD-10-CM

## 2024-09-30 PROCEDURE — 97140 MANUAL THERAPY 1/> REGIONS: CPT | Performed by: PHYSICAL THERAPIST

## 2024-09-30 PROCEDURE — 97110 THERAPEUTIC EXERCISES: CPT | Performed by: PHYSICAL THERAPIST

## 2024-09-30 NOTE — PROGRESS NOTES
"Daily Note     Today's date: 2024  Patient name: Jovany Little  : 1955  MRN: [id number]  Referring provider: Jacqueline Ventura DO  Dx:   Encounter Diagnosis     ICD-10-CM    1. Cervicothoracic somatic dysfunction  M99.01                      Subjective: Patient still has right elbow and forearm discomfort and tingling at times, and discomfort on the inside of the right shoulder blade. They continue to be less severe as time goes on.      Objective: Cervical right rot, right SB, and ext also produce above symptoms to a slight degree.      Assessment: Cervical spine AROM produced less pain and parasthesias after MT.      Plan: Continue per plan of care.      Precautions: None        Manuals    STM right mid thoracic spine Right C-T JCT-  RK  Right Upper thoracic spine-  RK R Upper thoracic spine CD RK RK RK   P/A Mob Right Mid thoracic spine sitting       RK RK   Right Mid thoracic Extension Snag sitting       RK RK   Right C-T JCT ext snags RK  Right Upper thoracic spine-  RK   RK       Neuro Re-Ed              Sitting Posture Correction with Roll             Centralization Concepts       RK     Disc Mechanics Instruction        RK     Prophylaxis of Recurrence                             Ther Ex              Head Retraction Sitting 10x5\" with OP 10x5\" with OP 5\" 10x w/OP 10x5\" with OP 10x5\" with OP 10x5\" with OP   C EXT AROM 10x5\" with strap into right quadrant  10x5\" with strap into right quadrant 5\" 10x no strap 10x5\" without strap 10x5\" with strap 10x5\" with strap   C Right ROT AROM 3x30\" 3x30\" 30\" 3x ea 30\"x3 ea 5x5\" 2x30\"   C SB AROM Right 3x30\"  Right 3x30\" 30\" 3x ea 30\"x3 ea Right 5x5\" Right 2x30\"   C FLEX AROM              T EXT AROM sitting          10x5\" TID HEP   Hoist Row: S 5    P3 3/10 P3 3/10  P3 310       Hoist LAT Pulldown: S 5    P3 3/10 P3 310  P3 3/10       XO SA Pulldown   P4 310 P4 3/10  P4 3/10       TB: T's    NV L2 3/10  L2 310       TB: Y's "  NV NV      TB: ER    L2 3/10 L2 3/10  L2 3/10        UBE for muscular endurance: S 6    L1   5 min L1 x 5 min  L1 7 min       Ther Activity                             Modalities

## 2024-10-03 ENCOUNTER — OFFICE VISIT (OUTPATIENT)
Dept: PHYSICAL THERAPY | Facility: CLINIC | Age: 69
End: 2024-10-03
Payer: MEDICARE

## 2024-10-03 DIAGNOSIS — M99.01 CERVICOTHORACIC SOMATIC DYSFUNCTION: Primary | ICD-10-CM

## 2024-10-03 PROCEDURE — 97140 MANUAL THERAPY 1/> REGIONS: CPT | Performed by: PHYSICAL THERAPIST

## 2024-10-03 PROCEDURE — 97110 THERAPEUTIC EXERCISES: CPT | Performed by: PHYSICAL THERAPIST

## 2024-10-03 NOTE — PROGRESS NOTES
"Daily Note     Today's date: 10/3/2024  Patient name: Jovany Little  : 1955  MRN: [id number]  Referring provider: Jacqueline Ventura DO  Dx:   Encounter Diagnosis     ICD-10-CM    1. Cervicothoracic somatic dysfunction  M99.01                      Subjective: Patient was doing some heavy lifting of buckets recently and has an increase of pain on the inside of his right shoulder blade. His right elbow symptoms are also increased.      Objective: Cervical extension AROM is more mildly limited by these symptoms.      Assessment: After MT cervical extension AROM was increased, and right scapular pain was resolved. Right elbow symptoms were also diminished.       Plan: Continue per plan of care.      Precautions: None        Manuals 9/16 9/19 9/23 9/30 10/3 9/12   STM right mid thoracic spine Right C-T JCT-  RK  Right Upper thoracic spine-  RK R Upper thoracic spine CD RK RK RK   P/A Mob Right Mid thoracic spine sitting        RK   Right Mid thoracic Extension Snag sitting        RK   Right C-T JCT ext snags RK  Right Upper thoracic spine-  RK   RK  RK     Neuro Re-Ed              Sitting Posture Correction with Roll             Centralization Concepts       RK     Disc Mechanics Instruction        RK     Prophylaxis of Recurrence                             Ther Ex              Head Retraction Sitting 10x5\" with OP 10x5\" with OP 5\" 10x w/OP 10x5\" with OP 10x5\" with OP 10x5\" with OP   C EXT AROM 10x5\" with strap into right quadrant  10x5\" with strap into right quadrant 5\" 10x no strap 10x5\" without strap 10x5\" without strap 10x5\" with strap   C Right ROT AROM 3x30\" 3x30\" 30\" 3x ea 30\"x3 ea 30\"x3 ea 2x30\"   C SB AROM Right 3x30\"  Right 3x30\" 30\" 3x ea 30\"x3 ea Right 30\"x3 Right 2x30\"   C FLEX AROM              T EXT AROM sitting          10x5\" TID HEP   Hoist Row: S 5    P3 3/10 P3 3/10  P3 3/10  P3 310     Hoist LAT Pulldown: S 5    P3 3/10 P3 3/10  P3 3/10  P3 3/10     XO SA Pulldown   P4 3/10 P4 3/10  P4 " 3/10  P4 3/10     TB: T's    NV L2 3/10  L2 3/10  L2 3/10     TB: Y's  NV NV      TB: ER    L2 3/10 L2 3/10  L2 3/10  L2 3/10      UBE for muscular endurance: S 6    L1   5 min L1 x 5 min  L1 7 min  L1 7 min     Ther Activity                             Modalities

## 2024-10-07 ENCOUNTER — OFFICE VISIT (OUTPATIENT)
Dept: PHYSICAL THERAPY | Facility: CLINIC | Age: 69
End: 2024-10-07
Payer: MEDICARE

## 2024-10-07 DIAGNOSIS — M99.01 CERVICOTHORACIC SOMATIC DYSFUNCTION: Primary | ICD-10-CM

## 2024-10-07 PROCEDURE — 97140 MANUAL THERAPY 1/> REGIONS: CPT | Performed by: PHYSICAL THERAPIST

## 2024-10-07 PROCEDURE — 97110 THERAPEUTIC EXERCISES: CPT | Performed by: PHYSICAL THERAPIST

## 2024-10-07 NOTE — PROGRESS NOTES
"Daily Note     Today's date: 10/7/2024  Patient name: Jovany Little  : 1955  MRN: [id number]  Referring provider: Jacqueline Ventura DO  Dx:   Encounter Diagnosis     ICD-10-CM    1. Cervicothoracic somatic dysfunction  M99.01                      Subjective: Patient gets relief of right shoulder blade pain and of right elbow discomfort and forearm parasthesias after treatment. He still experiences those symptoms intermittently during activity, and when resting his right arm on the elbow.      Objective: Positive Tinel's Sign for medial forearm and 4th and 5th digit parasthesias at the right cubital tunnel. Cervical right rotation, right SB and extension produce right elbow discomfort and forearm parasthesias.      Assessment: Cervical spine AROM again was less provocative of right arm symptoms after manual therapy.      Plan: Patient would like to continue PT because of improvement noted after treatment. He will be contacting his PCP to discuss additonal treatment or testing options since the symptoms are not resolving.     Precautions: None        Manuals 9/16 9/19 9/23 9/30 10/3 10/7   STM right mid thoracic spine Right C-T JCT-  RK  Right Upper thoracic spine-  RK R Upper thoracic spine CD RK RK RK   Right C-T JCT Right Rot and SB Snags        RK   Right Mid thoracic Extension Snag sitting           Right C-T JCT ext snags RK  Right Upper thoracic spine-  RK   RK  RK  RK   Neuro Re-Ed              Sitting Posture Correction with Roll             Centralization Concepts       RK     Disc Mechanics Instruction        RK     Prophylaxis of Recurrence                             Ther Ex              Head Retraction Sitting 10x5\" with OP 10x5\" with OP 5\" 10x w/OP 10x5\" with OP 10x5\" with OP 10x5\" with OP   C EXT AROM 10x5\" with strap into right quadrant  10x5\" with strap into right quadrant 5\" 10x no strap 10x5\" without strap 10x5\" without strap 10x5\" without strap   C Right ROT AROM 3x30\" 3x30\" 30\" 3x " "ea 30\"x3 ea 30\"x3 ea 30x3\" ea   C SB AROM Right 3x30\"  Right 3x30\" 30\" 3x ea 30\"x3 ea Right 30\"x3 Right 30x3\"   C FLEX AROM              T EXT AROM sitting             Hoist Row: S 5    P3 3/10 P3 3/10  P3 3/10  P3 3/10  P3 3/10   Hoist LAT Pulldown: S 5    P3 3/10 P3 3/10  P3 3/10  P3 3/10  P3 3/10   XO SA Pulldown   P4 3/10 P4 3/10  P4 3/10  P4 3/10  P4 3/10   TB: T's    NV L2 3/10  L2 3/10  L2 3/10  L2 3/10   TB: Y's  NV NV      TB: ER    L2 3/10 L2 3/10  L2 3/10  L2 3/10  L2 3/10    UBE for muscular endurance: S 6    L1   5 min L1 x 5 min  L1 7 min  L1 7 min  L1   7 min   Ther Activity                             Modalities                                                               "

## 2024-10-07 NOTE — PROGRESS NOTES
PT Re-Evaluation     Today's date: 10/17/2024  Patient name: Jovany Little  : 1955  MRN: [id number]  Referring provider: Jacqueline Ventura DO  Dx:   Encounter Diagnosis     ICD-10-CM    1. Cervicothoracic somatic dysfunction  M99.01                          Assessment    Assessment details:   CURRENT FUNCTIONAL STATUS    Sitting tolerance: Variable    Ability to turn neck right: Fair/Good  Ability to look up: Fair   Ability to reach forward: Good  Lifting tolerance 30 lbs.    SHORT TERM GOALS (2 WEEKS)    Increase cervical spine AROM 10 % in all restricted planes.  Improve sitting posture.  Decrease pain to 0-/10.  Independent with HEP.  Ability to turn neck right: Good  Ability to look up: Fair/Good   Ability to reach forward: Good  Lifting tolerance 35-40 lbs.    LONG TERM GOALS (DISCHARGE)    Cervical Spine AROM: WFL in all planes.-partially met  Sitting posture: Good-partially met  Decrease pain to 0-2/10.-partially met  Independent with HEP.-met  Sitting tolerance 60 minutes consistently.-met    Ability to turn neck right: Good-partially met  Ability to look up: Good-partially met  Ability to reach forward: Good-met  Lifting tolerance 40 lbs.-partially met      Understanding of Dx/Px/POC: good     Prognosis: good    Goals  See assessment details above.      Plan  Patient would benefit from: skilled physical therapy  Planned modality interventions: cryotherapy and thermotherapy: hydrocollator packs    Planned therapy interventions: manual therapy, neuromuscular re-education, therapeutic activities and home exercise program    Frequency: 2x week  Duration in weeks: 4  Plan details: The patient has shown some improvement in PT, but he continues to present with pain, decreased ROM, postural deficits, and decreased tolerance to activity. The patient would like to continue skilled PT services to address these issues and to maximize function. The patient will also continue performing their  "HEP.          Subjective Evaluation    History of Present Illness  Mechanism of injury: Subjective: The patient's right shoulder blade and right elbow pain, and right inner forearm and hand parasthesias have been variable in intensity the past 1-2 weeks. He can now sleep and reach forward without pain. Looking up and turning his head to the right still produce all of these symptoms. Use of his right arm will produce his right arm symptoms. He does get improvement in ROM and pain after treatment, but the symptoms are not resolving. He will be getting an MRI of the thoracic spine tomorrow.  Patient Goals  Patient goals for therapy: decreased pain and increased motion  Patient goal: Improved ability to sleep, sit, and lift.  Pain  Current pain ratin  At best pain ratin  At worst pain ratin          Objective     Postural Observations    Additional Postural Observation Details  Mild forward head sitting posture.    General Comments:      Cervical/Thoracic Comments  CURRENT OBJECTIVE MEASUREMENTS    Cervical Spine AROM: F=100 %, EXT=60 %, R ROT=75 %, L ROT=80 %, R SB=75 %, L SB=80 %.  Upper Extremity Strength: 5/5 t/o bilaterally except right thumb extension 4/5  Sitting Posture: Fair/Good                   Precautions: None        Manuals 10/14 10/17 9/23 9/30 10/3 10/7   STM right mid thoracic spine Right C-T JCT-  RK  Right Upper thoracic spine-  RK R Upper thoracic spine CD RK RK RK   Right C-T JCT Right Rot and SB Snags  RK  RK       RK   Right Mid thoracic Extension Snag sitting               Right C-T JCT ext snags RK  RK    RK  RK  RK   Neuro Re-Ed               Sitting Posture Correction with Roll               Centralization Concepts         RK     Disc Mechanics Instruction         RK     Prophylaxis of Recurrence                               Ther Ex               Head Retraction Sitting 10x5\" with OP 10x5\" with OP 5\" 10x w/OP 10x5\" with OP 10x5\" with OP 10x5\" with OP   C EXT AROM 10x5\" without " "strap   10x5\" without strap 5\" 10x no strap 10x5\" without strap 10x5\" without strap 10x5\" without strap   C Right ROT AROM 10x5\" 10x5\" 30\" 3x ea 30\"x3 ea 30\"x3 ea 30x3\" ea   C SB AROM Right 10x5\"  Right 10x5\" 30\" 3x ea 30\"x3 ea Right 30\"x3 Right 30x3\"   C FLEX AROM               T EXT AROM sitting               Hoist Row: S 5    P3 3/10  P3 3/10  P3 3/10  P3 3/10   Hoist LAT Pulldown: S 5    P3 3/10  P3 3/10  P3 3/10  P3 3/10   XO SA Pulldown    P4 3/10  P4 3/10  P4 3/10  P4 3/10   TB: T's     L2 3/10  L2 3/10  L2 3/10  L2 3/10   TB: Y's    NV         TB: ER    L2 3/10  L2 3/10  L2 3/10  L2 3/10    UBE for muscular endurance: S 6    L1 x 5 min  L1 7 min  L1 7 min  L1   7 min   Ther Activity                               Modalities                                                                            "

## 2024-10-09 ENCOUNTER — OFFICE VISIT (OUTPATIENT)
Dept: FAMILY MEDICINE CLINIC | Facility: CLINIC | Age: 69
End: 2024-10-09
Payer: MEDICARE

## 2024-10-09 VITALS
HEIGHT: 69 IN | HEART RATE: 69 BPM | RESPIRATION RATE: 18 BRPM | BODY MASS INDEX: 35.84 KG/M2 | OXYGEN SATURATION: 98 % | WEIGHT: 242 LBS | SYSTOLIC BLOOD PRESSURE: 124 MMHG | DIASTOLIC BLOOD PRESSURE: 78 MMHG | TEMPERATURE: 97.5 F

## 2024-10-09 DIAGNOSIS — Z23 ENCOUNTER FOR IMMUNIZATION: ICD-10-CM

## 2024-10-09 DIAGNOSIS — M54.14 THORACIC RADICULOPATHY: Primary | ICD-10-CM

## 2024-10-09 PROCEDURE — 90662 IIV NO PRSV INCREASED AG IM: CPT | Performed by: INTERNAL MEDICINE

## 2024-10-09 PROCEDURE — G0008 ADMIN INFLUENZA VIRUS VAC: HCPCS | Performed by: INTERNAL MEDICINE

## 2024-10-09 PROCEDURE — 99214 OFFICE O/P EST MOD 30 MIN: CPT | Performed by: INTERNAL MEDICINE

## 2024-10-09 RX ORDER — GABAPENTIN 300 MG/1
300 CAPSULE ORAL 2 TIMES DAILY
Qty: 60 CAPSULE | Refills: 5 | Status: SHIPPED | OUTPATIENT
Start: 2024-10-09

## 2024-10-09 NOTE — ASSESSMENT & PLAN NOTE
Orders:    MRI thoracic spine w contrast; Future    gabapentin (NEURONTIN) 300 mg capsule; Take 1 capsule (300 mg total) by mouth 2 (two) times a day  Pt will schedule MRI  Start Gabapentin ast least at HS ? Pain mgmt versus rerefer to PT Avoid lifting He has Robaxin at home and can take up to BID

## 2024-10-09 NOTE — PROGRESS NOTES
"Ambulatory Visit  Name: Jovany Little      : 1955      MRN: [id number]  Encounter Provider: Jacqueline Ventura DO  Encounter Date: 10/9/2024   Encounter department: [facility]    Assessment & Plan  Encounter for immunization    Orders:  •  influenza vaccine, high-dose, PF 0.5 mL (Fluzone High Dose)  Flu shot today  Thoracic radiculopathy    Orders:  •  MRI thoracic spine w contrast; Future  •  gabapentin (NEURONTIN) 300 mg capsule; Take 1 capsule (300 mg total) by mouth 2 (two) times a day  Pt will schedule MRI  Start Gabapentin ast least at HS ? Pain mgmt versus rerefer to PT Avoid lifting He has Robaxin at home and can take up to BID      History of Present Illness     HPI      Review of Systems   Constitutional:  Positive for activity change. Negative for chills and fever.   Eyes:  Negative for visual disturbance.   Respiratory:  Negative for cough and shortness of breath.    Cardiovascular:  Negative for chest pain, palpitations and leg swelling.   Musculoskeletal:  Positive for arthralgias and back pain.   Neurological:  Positive for numbness.   Psychiatric/Behavioral:  Negative for sleep disturbance. The patient is not nervous/anxious.    Past Medical History:   Diagnosis Date   • Arthritis    • Diabetes mellitus (HCC)    • Difficulty walking    • DM (diabetes mellitus) (HCC)    • GERD (gastroesophageal reflux disease)    • Gout    • Hyperlipidemia    • Hypertension    • Osteoarthritis    • Sleep apnea      Past Surgical History:   Procedure Laterality Date   • HERNIA REPAIR     • KNEE ARTHROPLASTY Left 2024   • LAMINECTOMY     • TONSILLECTOMY       ovarian cyst  Allergies   Allergen Reactions   • Nuts - Food Allergy Anaphylaxis           Objective     /78   Pulse 69   Temp 97.5 °F (36.4 °C) (Temporal)   Resp 18   Ht 5' 9\" (1.753 m)   Wt 110 kg (242 lb)   SpO2 98%   BMI 35.74 kg/m²     Physical Exam  Vitals and nursing note reviewed.   Constitutional:       General: " He is not in acute distress.     Appearance: Normal appearance. He is not ill-appearing, toxic-appearing or diaphoretic.   HENT:      Head: Normocephalic and atraumatic.      Right Ear: External ear normal.      Left Ear: External ear normal.      Nose: Nose normal.      Mouth/Throat:      Mouth: Mucous membranes are moist.   Eyes:      General: No scleral icterus.     Extraocular Movements: Extraocular movements intact.      Conjunctiva/sclera: Conjunctivae normal.      Pupils: Pupils are equal, round, and reactive to light.   Cardiovascular:      Rate and Rhythm: Normal rate and regular rhythm.      Pulses: Normal pulses.      Heart sounds: Normal heart sounds.   Pulmonary:      Effort: Pulmonary effort is normal. No respiratory distress.      Breath sounds: Normal breath sounds. No wheezing.   Musculoskeletal:      Cervical back: Normal range of motion and neck supple.   Lymphadenopathy:      Cervical: No cervical adenopathy.   Skin:     General: Skin is warm and dry.   Neurological:      General: No focal deficit present.      Mental Status: He is alert and oriented to person, place, and time. Mental status is at baseline.      Cranial Nerves: No cranial nerve deficit.      Sensory: Sensory deficit present.      Coordination: Coordination abnormal.   Psychiatric:         Mood and Affect: Mood normal.         Behavior: Behavior normal.         Thought Content: Thought content normal.         Judgment: Judgment normal.

## 2024-10-10 ENCOUNTER — APPOINTMENT (OUTPATIENT)
Dept: PHYSICAL THERAPY | Facility: CLINIC | Age: 69
End: 2024-10-10
Payer: MEDICARE

## 2024-10-12 PROBLEM — Z00.00 MEDICARE ANNUAL WELLNESS VISIT, SUBSEQUENT: Status: RESOLVED | Noted: 2022-08-30 | Resolved: 2024-10-12

## 2024-10-14 ENCOUNTER — OFFICE VISIT (OUTPATIENT)
Dept: PHYSICAL THERAPY | Facility: CLINIC | Age: 69
End: 2024-10-14
Payer: MEDICARE

## 2024-10-14 DIAGNOSIS — M99.01 CERVICOTHORACIC SOMATIC DYSFUNCTION: Primary | ICD-10-CM

## 2024-10-14 PROCEDURE — 97140 MANUAL THERAPY 1/> REGIONS: CPT | Performed by: PHYSICAL THERAPIST

## 2024-10-14 PROCEDURE — 97110 THERAPEUTIC EXERCISES: CPT | Performed by: PHYSICAL THERAPIST

## 2024-10-14 NOTE — PROGRESS NOTES
"Daily Note     Today's date: 10/14/2024  Patient name: Jovany Little  : 1955  MRN: [id number]  Referring provider: Jacqueline Ventura DO  Dx:   Encounter Diagnosis     ICD-10-CM    1. Cervicothoracic somatic dysfunction  M99.01                      Subjective: Patient developed increased right shoulder blade and right elbow pain while riding in a car one week ago. He saw his PCP who put him on robaxin and gabapentin. He will also be getting an MRI on 10/17/2024.      Objective: Cervical right ROT, right SB, and ext are decreased due to right medial scapular pain.      Assessment: Tolerated treatment well. Cervical spine ROM was increased and was less painful after treatment. Held on gym exercise.      Plan: Resume gym exercise at next session.     Precautions: None        Manuals 10/14 9/19 9/23 9/30 10/3 10/7   STM right mid thoracic spine Right C-T JCT-  RK  Right Upper thoracic spine-  RK R Upper thoracic spine CD RK RK RK   Right C-T JCT Right Rot and SB Snags  RK      RK   Right Mid thoracic Extension Snag sitting           Right C-T JCT ext snags RK  Right Upper thoracic spine-  RK   RK  RK  RK   Neuro Re-Ed              Sitting Posture Correction with Roll             Centralization Concepts       RK     Disc Mechanics Instruction        RK     Prophylaxis of Recurrence                             Ther Ex              Head Retraction Sitting 10x5\" with OP 10x5\" with OP 5\" 10x w/OP 10x5\" with OP 10x5\" with OP 10x5\" with OP   C EXT AROM 10x5\" without strap   10x5\" with strap into right quadrant 5\" 10x no strap 10x5\" without strap 10x5\" without strap 10x5\" without strap   C Right ROT AROM 10x5\" 3x30\" 30\" 3x ea 30\"x3 ea 30\"x3 ea 30x3\" ea   C SB AROM Right 10x5\"  Right 3x30\" 30\" 3x ea 30\"x3 ea Right 30\"x3 Right 30x3\"   C FLEX AROM              T EXT AROM sitting             Hoist Row: S 5   P3 3/10 P3 3/10  P3 3/10  P3 3/10  P3 3/10   Hoist LAT Pulldown: S 5   P3 3/10 P3 3/10  P3 3/10  P3 3/10  P3 " 3/10   XO SA Pulldown  P4 3/10 P4 3/10  P4 3/10  P4 3/10  P4 3/10   TB: T's   NV L2 3/10  L2 3/10  L2 3/10  L2 3/10   TB: Y's  NV NV      TB: ER   L2 3/10 L2 3/10  L2 3/10  L2 3/10  L2 3/10    UBE for muscular endurance: S 6   L1   5 min L1 x 5 min  L1 7 min  L1 7 min  L1   7 min   Ther Activity                             Modalities

## 2024-10-17 ENCOUNTER — EVALUATION (OUTPATIENT)
Dept: PHYSICAL THERAPY | Facility: CLINIC | Age: 69
End: 2024-10-17
Payer: MEDICARE

## 2024-10-17 DIAGNOSIS — M99.01 CERVICOTHORACIC SOMATIC DYSFUNCTION: Primary | ICD-10-CM

## 2024-10-17 PROCEDURE — 97140 MANUAL THERAPY 1/> REGIONS: CPT | Performed by: PHYSICAL THERAPIST

## 2024-10-17 PROCEDURE — 97110 THERAPEUTIC EXERCISES: CPT | Performed by: PHYSICAL THERAPIST

## 2024-10-18 ENCOUNTER — HOSPITAL ENCOUNTER (OUTPATIENT)
Dept: MRI IMAGING | Facility: HOSPITAL | Age: 69
Discharge: HOME | End: 2024-10-18
Attending: INTERNAL MEDICINE
Payer: MEDICARE

## 2024-10-18 DIAGNOSIS — M54.14 THORACIC RADICULOPATHY: ICD-10-CM

## 2024-10-18 PROCEDURE — 72157 MRI CHEST SPINE W/O & W/DYE: CPT

## 2024-10-18 PROCEDURE — A9585 GADOBUTROL INJECTION: HCPCS | Performed by: INTERNAL MEDICINE

## 2024-10-18 RX ORDER — GADOBUTROL 604.72 MG/ML
11 INJECTION INTRAVENOUS
Status: COMPLETED | OUTPATIENT
Start: 2024-10-18 | End: 2024-10-18

## 2024-10-18 RX ADMIN — GADOBUTROL 11 ML: 604.72 INJECTION INTRAVENOUS at 13:22

## 2024-10-21 ENCOUNTER — OFFICE VISIT (OUTPATIENT)
Dept: PHYSICAL THERAPY | Facility: CLINIC | Age: 69
End: 2024-10-21
Payer: MEDICARE

## 2024-10-21 DIAGNOSIS — N28.9 RENAL LESION: Primary | ICD-10-CM

## 2024-10-21 DIAGNOSIS — M99.01 CERVICOTHORACIC SOMATIC DYSFUNCTION: Primary | ICD-10-CM

## 2024-10-21 DIAGNOSIS — G95.89 CERVICAL CORD MYELOMALACIA (HCC): ICD-10-CM

## 2024-10-21 PROCEDURE — 97110 THERAPEUTIC EXERCISES: CPT | Performed by: PHYSICAL THERAPIST

## 2024-10-21 PROCEDURE — 97140 MANUAL THERAPY 1/> REGIONS: CPT | Performed by: PHYSICAL THERAPIST

## 2024-10-21 NOTE — PROGRESS NOTES
"Daily Note     Today's date: 10/21/2024  Patient name: Jovany Little  : 1955  MRN: [id number]  Referring provider: Jacqueline Ventura DO  Dx:   Encounter Diagnosis     ICD-10-CM    1. Cervicothoracic somatic dysfunction  M99.01                      Subjective: Patient states that he had his MRI last week, he is still awaiting the results. Last evening he had more symptoms in the right scapula and arm.      Objective: Cervical right SB and right ROT are pain at the inferior angle of the scapula. Ext, right SB, and right rot all produce right arm parasthesias.      Assessment: All cervical movements were less symptomatic after treatment.       Plan: Continue per plan of care.      Precautions: None        Manuals 10/14 10/17 10/21 9/30 10/3 10/7   STM right mid thoracic spine Right C-T JCT-  RK  Right Upper thoracic spine-  RK R Upper thoracic spine-  RK RK RK RK   Right C-T JCT Right Rot and SB Snags  RK  RK  RK     RK   Right Mid thoracic Extension Snag sitting               Right C-T JCT ext snags RK  RK  RK  RK  RK  RK   Neuro Re-Ed               Sitting Posture Correction with Roll               Centralization Concepts         RK     Disc Mechanics Instruction         RK     Prophylaxis of Recurrence                               Ther Ex               Head Retraction Sitting 10x5\" with OP 10x5\" with OP 10x5\" w/OP 10x5\" with OP 10x5\" with OP 10x5\" with OP   C EXT AROM 10x5\" without strap   10x5\" without strap 10x5\" without strap 10x5\" without strap 10x5\" without strap 10x5\" without strap   C Right ROT AROM 10x5\" 10x5\" 10x5\" 30\"x3 ea 30\"x3 ea 30x3\" ea   C SB AROM Right 10x5\"  Right 10x5\" Right 10x5\" 30\"x3 ea Right 30\"x3 Right 30x3\"   C FLEX AROM               T EXT AROM sitting               Hoist Row: S 5      P3 3/10  P3 3/10  P3 3/10   Hoist LAT Pulldown: S 5      P3 3/10  P3 3/10  P3 3/10   XO SA Pulldown      P4 3/10  P4 3/10  P4 3/10   TB: T's       L2 3/10  L2 3/10  L2 3/10   TB: Y's           "   TB: ER      L2 3/10  L2 3/10  L2 3/10    UBE for muscular endurance: S 6      L1 7 min  L1 7 min  L1   7 min   Ther Activity                               Modalities

## 2024-10-24 ENCOUNTER — OFFICE VISIT (OUTPATIENT)
Dept: PHYSICAL THERAPY | Facility: CLINIC | Age: 69
End: 2024-10-24
Payer: MEDICARE

## 2024-10-24 DIAGNOSIS — M99.01 CERVICOTHORACIC SOMATIC DYSFUNCTION: Primary | ICD-10-CM

## 2024-10-24 PROCEDURE — 97140 MANUAL THERAPY 1/> REGIONS: CPT | Performed by: PHYSICAL THERAPIST

## 2024-10-24 PROCEDURE — 97110 THERAPEUTIC EXERCISES: CPT | Performed by: PHYSICAL THERAPIST

## 2024-10-24 NOTE — PROGRESS NOTES
"Daily Note     Today's date: 10/24/2024  Patient name: Jovany Little  : 1955  MRN: [id number]  Referring provider: Jacqueline Ventura DO  Dx:   Encounter Diagnosis     ICD-10-CM    1. Cervicothoracic somatic dysfunction  M99.01                      Subjective: Patient states that his thoracic spine MRI did not reveal anything related to his symptoms. He will be aving a cervical spine MRI on 2024. He feels that his right arm symptoms have not andres as bad the past few days.      Objective: See treatment diary below      Assessment: Tolerated treatment well. Patient had less right arm symptoms during MT and exercise today.      Plan: Continue per plan of care.      Precautions: None        Manuals 10/14 10/17 10/21 10/24 10/3 10/7   STM right mid thoracic spine Right C-T JCT-  RK  Right Upper thoracic spine-  RK R Upper thoracic spine-  RK Right Upper Thoracic spine-RK RK RK   Right C-T JCT Right Rot and SB Snags  RK  RK  RK  RK   RK   Right Mid thoracic Extension Snag sitting               Right C-T JCT ext snags RK  RK  RK  RK  RK  RK   Neuro Re-Ed               Sitting Posture Correction with Roll               Centralization Concepts         RK     Disc Mechanics Instruction         RK     Prophylaxis of Recurrence                               Ther Ex               Head Retraction Sitting 10x5\" with OP 10x5\" with OP 10x5\" w/OP 10x5\" with OP 10x5\" with OP 10x5\" with OP   C EXT AROM 10x5\" without strap   10x5\" without strap 10x5\" without strap 10x5\" without strap 10x5\" without strap 10x5\" without strap   C Right ROT AROM 10x5\" 10x5\" 10x5\" 10x5\" 30\"x3 ea 30x3\" ea   C SB AROM Right 10x5\"  Right 10x5\" Right 10x5\" Right 10x5\" Right 30\"x3 Right 30x3\"   C FLEX AROM               T EXT AROM sitting               Hoist Row: S 5       P3 3/10  P3 310   Hoist LAT Pulldown: S 5       P3 310  P3 3/10   XO SA Pulldown       P4 3/10  P4 3/10   TB: T's        L2 3/10  L2 3/10   TB: Y's            TB: ER       L2 " 3/10  L2 3/10    UBE for muscular endurance: S 6       L1 7 min  L1   7 min   Ther Activity                               Modalities

## 2024-10-25 ENCOUNTER — HOSPITAL ENCOUNTER (OUTPATIENT)
Dept: ULTRASOUND IMAGING | Facility: HOSPITAL | Age: 69
Discharge: HOME | End: 2024-10-25
Attending: INTERNAL MEDICINE
Payer: MEDICARE

## 2024-10-25 DIAGNOSIS — N28.9 RENAL LESION: ICD-10-CM

## 2024-10-25 PROCEDURE — 76775 US EXAM ABDO BACK WALL LIM: CPT

## 2024-10-28 ENCOUNTER — APPOINTMENT (OUTPATIENT)
Dept: PHYSICAL THERAPY | Facility: CLINIC | Age: 69
End: 2024-10-28
Payer: MEDICARE

## 2024-10-31 ENCOUNTER — OFFICE VISIT (OUTPATIENT)
Dept: PHYSICAL THERAPY | Facility: CLINIC | Age: 69
End: 2024-10-31
Payer: MEDICARE

## 2024-10-31 ENCOUNTER — HOSPITAL ENCOUNTER (OUTPATIENT)
Dept: MRI IMAGING | Facility: HOSPITAL | Age: 69
Discharge: HOME | End: 2024-10-31
Attending: INTERNAL MEDICINE
Payer: MEDICARE

## 2024-10-31 DIAGNOSIS — M99.01 CERVICOTHORACIC SOMATIC DYSFUNCTION: Primary | ICD-10-CM

## 2024-10-31 DIAGNOSIS — G95.89 CERVICAL CORD MYELOMALACIA (HCC): ICD-10-CM

## 2024-10-31 PROCEDURE — 97140 MANUAL THERAPY 1/> REGIONS: CPT | Performed by: PHYSICAL THERAPIST

## 2024-10-31 PROCEDURE — 72156 MRI NECK SPINE W/O & W/DYE: CPT

## 2024-10-31 PROCEDURE — 97110 THERAPEUTIC EXERCISES: CPT | Performed by: PHYSICAL THERAPIST

## 2024-10-31 PROCEDURE — A9585 GADOBUTROL INJECTION: HCPCS | Performed by: INTERNAL MEDICINE

## 2024-10-31 RX ORDER — GADOBUTROL 604.72 MG/ML
11 INJECTION INTRAVENOUS
Status: COMPLETED | OUTPATIENT
Start: 2024-10-31 | End: 2024-10-31

## 2024-10-31 RX ADMIN — GADOBUTROL 11 ML: 604.72 INJECTION INTRAVENOUS at 10:33

## 2024-10-31 NOTE — PROGRESS NOTES
"Daily Note     Today's date: 10/31/2024  Patient name: Jovany Little  : 1955  MRN: [id number]  Referring provider: Jacqueline Ventura DO  Dx:   Encounter Diagnosis     ICD-10-CM    1. Cervicothoracic somatic dysfunction  M99.01                      Subjective: Patient states that his pain and tingling have decreased in intensity and in frequency since the last visit. He is not having any symptoms when sleeping. He will have his neck MRI later this morning.      Objective: Extension produces right mid scapula discomfort. Extension, Right Rot, and right SB produce right elbow and inner forearm parsathesias.      Assessment: Right scapula pain resolved and right arm parasthesias was decreased after treatment.       Plan: Continue per plan of care.      Precautions: None        Manuals 10/14 10/17 10/21 10/24 10/31 10/7   STM right mid thoracic spine Right C-T JCT-  RK  Right Upper thoracic spine-  RK R Upper thoracic spine-  RK Right Upper Thoracic spine-RK Right Upper Thoracic spine-RK RK   Right C-T JCT Right Rot and SB Snags  RK  RK  RK  RK  RK RK   Right Mid thoracic Extension Snag sitting               Right C-T JCT ext snags RK  RK  RK  RK  RK  RK   Neuro Re-Ed               Sitting Posture Correction with Roll               Centralization Concepts              Disc Mechanics Instruction              Prophylaxis of Recurrence                               Ther Ex               Head Retraction Sitting 10x5\" with OP 10x5\" with OP 10x5\" w/OP 10x5\" with OP 10x5\" with OP 10x5\" with OP   C EXT AROM 10x5\" without strap   10x5\" without strap 10x5\" without strap 10x5\" without strap 10x5\" without strap 10x5\" without strap   C Right ROT AROM 10x5\" 10x5\" 10x5\" 10x5\" 10x5\" 30x3\" ea   C SB AROM Right 10x5\"  Right 10x5\" Right 10x5\" Right 10x5\" Right 10x5\" Right 30x3\"   C FLEX AROM               T EXT AROM sitting               Hoist Row: S 5        P3 3/10   Hoist LAT Pulldown: S 5        P3 3/10   XO SA Pulldown   "      P4 3/10   TB: T's         L2 3/10   TB: Y's           TB: ER        L2 3/10    UBE for muscular endurance: S 6        L1   7 min   Ther Activity                               Modalities

## 2024-11-04 ENCOUNTER — OFFICE VISIT (OUTPATIENT)
Dept: PHYSICAL THERAPY | Facility: CLINIC | Age: 69
End: 2024-11-04
Payer: MEDICARE

## 2024-11-04 DIAGNOSIS — M99.01 CERVICOTHORACIC SOMATIC DYSFUNCTION: Primary | ICD-10-CM

## 2024-11-04 PROCEDURE — 97140 MANUAL THERAPY 1/> REGIONS: CPT | Performed by: PHYSICAL THERAPIST

## 2024-11-04 PROCEDURE — 97110 THERAPEUTIC EXERCISES: CPT | Performed by: PHYSICAL THERAPIST

## 2024-11-04 NOTE — PROGRESS NOTES
PT Discharge    Today's date: 2024  Patient name: Jovany Little  : 1955  MRN: [id number]  Referring provider: Jacqueline Ventura DO  Dx:   Encounter Diagnosis     ICD-10-CM    1. Cervicothoracic somatic dysfunction  M99.01                            Assessment    Assessment details:   CURRENT FUNCTIONAL STATUS    Sitting tolerance: Good    Ability to turn neck right: Good  Ability to look up: Fair/Good   Ability to reach forward: Good  Lifting tolerance 30 lbs.    LONG TERM GOALS (DISCHARGE)    Cervical Spine AROM: WFL in all planes.-met  Sitting posture: Good-partially met  Decrease pain to 0-2/10.-met  Independent with HEP.-met  Sitting tolerance 60 minutes consistently.-met    Ability to turn neck right: Good-met  Ability to look up: Good-partially met  Ability to reach forward: Good-met  Lifting tolerance 40 lbs.-partially met      Understanding of Dx/Px/POC: good     Prognosis: good    Goals  See assessment details above.      Plan  Planned modality interventions: cryotherapy and thermotherapy: hydrocollator packs    Planned therapy interventions: manual therapy, neuromuscular re-education, therapeutic activities and home exercise program    Plan details: The patient has shown good improvement in PT demonstrating decreased pain, increased range of motion, increased strength, and increased tolerance to activity. Goals have been met, the patient is satisfied with his current status, and he has been discharged to a home exercise program.          Subjective Evaluation    History of Present Illness  Mechanism of injury: Subjective: The patient's right shoulder blade and right elbow pain, and right inner forearm and hand parasthesias have significantly improved. He can now sleep and reach forward without pain. Looking up and turning his head to the right still produce the parasthesias to a slight degree. He is pleased with his current status and requests discharge from PT at this time.  Patient  "Goals  Patient goals for therapy: decreased pain and increased motion  Patient goal: Improved ability to sleep, sit, and lift.  Pain  Current pain ratin  At best pain ratin  At worst pain ratin  Location: Medial right elbow        Objective     Postural Observations    Additional Postural Observation Details  Mild forward head sitting posture.    General Comments:      Cervical/Thoracic Comments  CURRENT OBJECTIVE MEASUREMENTS    Cervical Spine AROM: F=100 %, EXT=75 % with medial forearm/hand parsathesias and right scapula pain, R ROT=90 % with medial forearm/hand parsathesias, L ROT=90 %, R SB=80 % with medial forearm/hand parsathesias, L SB=80 %.  Upper Extremity Strength: 5/5 t/o bilaterally except right thumb extension 4/5   Upper Extremity Sensation: Decreased to light touch right medial forearm/hand.  Sitting Posture: Fair/Good                 Precautions: None        Manuals 11/7 10/17 10/21 10/24 10/31 11   STM right mid thoracic spine RK  Right Upper thoracic spine-  RK R Upper thoracic spine-  RK Right Upper Thoracic spine-RK Right Upper Thoracic spine-RK Right Upper Thoracic spine-RK   Right C-T JCT Right Rot and SB Snags  RK  RK  RK  RK  RK RK   Right Mid thoracic Extension Snag sitting               Right C-T JCT ext snags RK  RK  RK  RK  RK  RK   Neuro Re-Ed               Sitting Posture Correction with Roll               Centralization Concepts               Disc Mechanics Instruction               Prophylaxis of Recurrence                               Ther Ex               Head Retraction Sitting 10x5\" with OP 10x5\" with OP 10x5\" w/OP 10x5\" with OP 10x5\" with OP 10x5\" with OP   C EXT AROM 10x5\" without strap   10x5\" without strap 10x5\" without strap 10x5\" without strap 10x5\" without strap 10x5\" without strap   C Right ROT AROM 10x5\" 10x5\" 10x5\" 10x5\" 10x5\" 10x5\"   C SB AROM Right 10x5\"  Right 10x5\" Right 10x5\" Right 10x5\" Right 10x5\" Right 10x5\"   C FLEX AROM               T EXT AROM " sitting               Hoist Row: S 5               Hoist LAT Pulldown: S 5               XO SA Pulldown               TB: T's                TB: Y's               TB: ER                UBE for muscular endurance: S 6               Ther Activity                               Modalities

## 2024-11-04 NOTE — PROGRESS NOTES
"Daily Note     Today's date: 2024  Patient name: Jovany Little  : 1955  MRN: [id number]  Referring provider: Jacqueline Ventura DO  Dx:   Encounter Diagnosis     ICD-10-CM    1. Cervicothoracic somatic dysfunction  M99.01                      Subjective: Patient had no right scapular pain, and very little right elbow and forearm symptoms while doing yard work recently. He is waiting to hear from his surgeon about his MRI.      Objective: Minimal right elbow and forearm discomfort and parasthesias were produced during cervical right SB, right ROT, and Ext.      Assessment: Tolerated treatment well, having little or no symptoms during exercise and MT.    Plan: Potential discharge next visit.     Precautions: None        Manuals 10/14 10/17 10/21 10/24 10/31 11/4   STM right mid thoracic spine Right C-T JCT-  RK  Right Upper thoracic spine-  RK R Upper thoracic spine-  RK Right Upper Thoracic spine-RK Right Upper Thoracic spine-RK Right Upper Thoracic spine-RK   Right C-T JCT Right Rot and SB Snags  RK  RK  RK  RK  RK RK   Right Mid thoracic Extension Snag sitting               Right C-T JCT ext snags RK  RK  RK  RK  RK  RK   Neuro Re-Ed               Sitting Posture Correction with Roll               Centralization Concepts              Disc Mechanics Instruction              Prophylaxis of Recurrence                               Ther Ex               Head Retraction Sitting 10x5\" with OP 10x5\" with OP 10x5\" w/OP 10x5\" with OP 10x5\" with OP 10x5\" with OP   C EXT AROM 10x5\" without strap   10x5\" without strap 10x5\" without strap 10x5\" without strap 10x5\" without strap 10x5\" without strap   C Right ROT AROM 10x5\" 10x5\" 10x5\" 10x5\" 10x5\" 10x5\"   C SB AROM Right 10x5\"  Right 10x5\" Right 10x5\" Right 10x5\" Right 10x5\" Right 10x5\"   C FLEX AROM               T EXT AROM sitting               Hoist Row: S 5          Hoist LAT Pulldown: S 5          XO SA Pulldown          TB: T's           TB: Y's        "   TB: ER           UBE for muscular endurance: S 6          Ther Activity                               Modalities

## 2024-11-07 ENCOUNTER — EVALUATION (OUTPATIENT)
Dept: PHYSICAL THERAPY | Facility: CLINIC | Age: 69
End: 2024-11-07
Payer: MEDICARE

## 2024-11-07 DIAGNOSIS — M99.01 CERVICOTHORACIC SOMATIC DYSFUNCTION: Primary | ICD-10-CM

## 2024-11-07 PROCEDURE — 97140 MANUAL THERAPY 1/> REGIONS: CPT | Performed by: PHYSICAL THERAPIST

## 2024-11-07 PROCEDURE — 97110 THERAPEUTIC EXERCISES: CPT | Performed by: PHYSICAL THERAPIST

## 2024-11-11 ENCOUNTER — APPOINTMENT (OUTPATIENT)
Dept: PHYSICAL THERAPY | Facility: CLINIC | Age: 69
End: 2024-11-11
Payer: MEDICARE

## 2024-11-14 ENCOUNTER — APPOINTMENT (OUTPATIENT)
Dept: PHYSICAL THERAPY | Facility: CLINIC | Age: 69
End: 2024-11-14
Payer: MEDICARE

## 2024-11-14 DIAGNOSIS — M15.0 PRIMARY OSTEOARTHRITIS INVOLVING MULTIPLE JOINTS: ICD-10-CM

## 2024-11-14 DIAGNOSIS — Z29.89 SBE (SUBACUTE BACTERIAL ENDOCARDITIS) PROPHYLAXIS CANDIDATE: Primary | ICD-10-CM

## 2024-11-14 RX ORDER — CELECOXIB 200 MG/1
200 CAPSULE ORAL DAILY
Qty: 30 CAPSULE | Refills: 0 | Status: SHIPPED | OUTPATIENT
Start: 2024-11-14

## 2024-11-14 RX ORDER — AMOXICILLIN 500 MG/1
CAPSULE ORAL
Qty: 4 CAPSULE | Refills: 0 | Status: SHIPPED | OUTPATIENT
Start: 2024-11-14 | End: 2024-11-15

## 2024-11-18 ENCOUNTER — APPOINTMENT (OUTPATIENT)
Dept: PHYSICAL THERAPY | Facility: CLINIC | Age: 69
End: 2024-11-18
Payer: MEDICARE

## 2024-11-21 ENCOUNTER — APPOINTMENT (OUTPATIENT)
Dept: PHYSICAL THERAPY | Facility: CLINIC | Age: 69
End: 2024-11-21
Payer: MEDICARE

## 2024-11-25 ENCOUNTER — APPOINTMENT (OUTPATIENT)
Dept: PHYSICAL THERAPY | Facility: CLINIC | Age: 69
End: 2024-11-25
Payer: MEDICARE

## 2024-11-27 ENCOUNTER — APPOINTMENT (OUTPATIENT)
Dept: PHYSICAL THERAPY | Facility: CLINIC | Age: 69
End: 2024-11-27
Payer: MEDICARE

## 2024-12-06 ENCOUNTER — RA CDI HCC (OUTPATIENT)
Dept: OTHER | Facility: HOSPITAL | Age: 69
End: 2024-12-06

## 2024-12-10 ENCOUNTER — APPOINTMENT (OUTPATIENT)
Dept: LAB | Facility: MEDICAL CENTER | Age: 69
End: 2024-12-10
Payer: MEDICARE

## 2024-12-10 DIAGNOSIS — Z79.4 TYPE 2 DIABETES MELLITUS WITH CHRONIC KIDNEY DISEASE, WITH LONG-TERM CURRENT USE OF INSULIN, UNSPECIFIED CKD STAGE (HCC): ICD-10-CM

## 2024-12-10 DIAGNOSIS — E11.22 TYPE 2 DIABETES MELLITUS WITH CHRONIC KIDNEY DISEASE, WITH LONG-TERM CURRENT USE OF INSULIN, UNSPECIFIED CKD STAGE (HCC): ICD-10-CM

## 2024-12-10 DIAGNOSIS — E83.52 HYPERCALCEMIA: ICD-10-CM

## 2024-12-10 LAB
ALBUMIN SERPL BCG-MCNC: 4.2 G/DL (ref 3.5–5)
ALP SERPL-CCNC: 63 U/L (ref 34–104)
ALT SERPL W P-5'-P-CCNC: 27 U/L (ref 7–52)
ANION GAP SERPL CALCULATED.3IONS-SCNC: 10 MMOL/L (ref 4–13)
AST SERPL W P-5'-P-CCNC: 25 U/L (ref 13–39)
BILIRUB SERPL-MCNC: 0.63 MG/DL (ref 0.2–1)
BUN SERPL-MCNC: 26 MG/DL (ref 5–25)
CALCIUM SERPL-MCNC: 10.2 MG/DL (ref 8.4–10.2)
CHLORIDE SERPL-SCNC: 102 MMOL/L (ref 96–108)
CHOLEST SERPL-MCNC: 179 MG/DL (ref ?–200)
CO2 SERPL-SCNC: 27 MMOL/L (ref 21–32)
CREAT SERPL-MCNC: 1.13 MG/DL (ref 0.6–1.3)
EST. AVERAGE GLUCOSE BLD GHB EST-MCNC: 140 MG/DL
GFR SERPL CREATININE-BSD FRML MDRD: 66 ML/MIN/1.73SQ M
GLUCOSE P FAST SERPL-MCNC: 113 MG/DL (ref 65–99)
HBA1C MFR BLD: 6.5 %
HDLC SERPL-MCNC: 41 MG/DL
LDLC SERPL CALC-MCNC: 78 MG/DL (ref 0–100)
POTASSIUM SERPL-SCNC: 4 MMOL/L (ref 3.5–5.3)
PROT SERPL-MCNC: 6.8 G/DL (ref 6.4–8.4)
PTH-INTACT SERPL-MCNC: 57.7 PG/ML (ref 12–88)
SODIUM SERPL-SCNC: 139 MMOL/L (ref 135–147)
TRIGL SERPL-MCNC: 300 MG/DL (ref ?–150)

## 2024-12-10 PROCEDURE — 36415 COLL VENOUS BLD VENIPUNCTURE: CPT

## 2024-12-10 PROCEDURE — 83970 ASSAY OF PARATHORMONE: CPT

## 2024-12-10 PROCEDURE — 80053 COMPREHEN METABOLIC PANEL: CPT

## 2024-12-10 PROCEDURE — 80061 LIPID PANEL: CPT

## 2024-12-10 PROCEDURE — 83036 HEMOGLOBIN GLYCOSYLATED A1C: CPT

## 2024-12-13 ENCOUNTER — OFFICE VISIT (OUTPATIENT)
Dept: FAMILY MEDICINE CLINIC | Facility: CLINIC | Age: 69
End: 2024-12-13
Payer: MEDICARE

## 2024-12-13 VITALS
HEART RATE: 75 BPM | TEMPERATURE: 97.3 F | HEIGHT: 69 IN | OXYGEN SATURATION: 97 % | BODY MASS INDEX: 34.96 KG/M2 | SYSTOLIC BLOOD PRESSURE: 126 MMHG | RESPIRATION RATE: 18 BRPM | WEIGHT: 236 LBS | DIASTOLIC BLOOD PRESSURE: 70 MMHG

## 2024-12-13 DIAGNOSIS — G56.21 CUBITAL TUNNEL SYNDROME ON RIGHT: Primary | ICD-10-CM

## 2024-12-13 DIAGNOSIS — E66.01 CLASS 2 SEVERE OBESITY DUE TO EXCESS CALORIES WITH SERIOUS COMORBIDITY IN ADULT, UNSPECIFIED BMI (HCC): ICD-10-CM

## 2024-12-13 DIAGNOSIS — E66.812 CLASS 2 SEVERE OBESITY DUE TO EXCESS CALORIES WITH SERIOUS COMORBIDITY IN ADULT, UNSPECIFIED BMI (HCC): ICD-10-CM

## 2024-12-13 DIAGNOSIS — Z79.4 TYPE 2 DIABETES MELLITUS WITH CHRONIC KIDNEY DISEASE, WITH LONG-TERM CURRENT USE OF INSULIN, UNSPECIFIED CKD STAGE (HCC): ICD-10-CM

## 2024-12-13 DIAGNOSIS — E11.22 TYPE 2 DIABETES MELLITUS WITH CHRONIC KIDNEY DISEASE, WITH LONG-TERM CURRENT USE OF INSULIN, UNSPECIFIED CKD STAGE (HCC): ICD-10-CM

## 2024-12-13 DIAGNOSIS — M54.14 THORACIC RADICULOPATHY: ICD-10-CM

## 2024-12-13 PROCEDURE — G2211 COMPLEX E/M VISIT ADD ON: HCPCS | Performed by: INTERNAL MEDICINE

## 2024-12-13 PROCEDURE — 99214 OFFICE O/P EST MOD 30 MIN: CPT | Performed by: INTERNAL MEDICINE

## 2024-12-13 RX ORDER — CEPHALEXIN 500 MG/1
CAPSULE ORAL
COMMUNITY
Start: 2024-10-09

## 2024-12-13 NOTE — ASSESSMENT & PLAN NOTE
Lab Results   Component Value Date    HGBA1C 6.5 (H) 12/10/2024   Lo carb diet and exercise recheck 3 months - holidays have been a challenge to maintain regular routine     Orders:    Hemoglobin A1C; Standing    Comprehensive metabolic panel; Standing    Triglycerides; Future

## 2024-12-13 NOTE — PROGRESS NOTES
Name: Jovany Little      : 1955      MRN: [id number]  Encounter Provider: Jacqueline Ventura DO  Encounter Date: 2024   Encounter department: [facility]  :  Assessment & Plan  Cubital tunnel syndrome on right    Orders:  •  Ambulatory Referral to Orthopedic Surgery; Future  Assess by hand surgeon Recognize the neck is part of his issue but has symptoms which seem consistent with cubital tunnel   Type 2 diabetes mellitus with chronic kidney disease, with long-term current use of insulin, unspecified CKD stage (HCC)    Lab Results   Component Value Date    HGBA1C 6.5 (H) 12/10/2024   Lo carb diet and exercise recheck 3 months - holidays have been a challenge to maintain regular routine     Orders:  •  Hemoglobin A1C; Standing  •  Comprehensive metabolic panel; Standing  •  Triglycerides; Future    Thoracic radiculopathy  Sxs stable and he did feel PT was helpful and has exercises        Class 2 severe obesity due to excess calories with serious comorbidity in adult, unspecified BMI (HCC)  Pt will get back on regular regimen in terms of lo carb and exercise          Rto 3months with labs      Depression Screening and Follow-up Plan: Patient was screened for depression during today's encounter. They screened negative with a PHQ-2 score of 0.    History of Present Illness     HPI  Pt doing ok He has ongoing numbness that seems positional rue He did feel therapy helped upper area sxs/neck pain and he does have exercises His diet not as well controlled recently but he is trying to get back to his routine for sleep, hydration and lo carb foods No chest pain or sob No recent illness   Review of Systems   Constitutional:  Negative for chills and fever.   HENT: Negative.     Eyes:  Negative for visual disturbance.   Respiratory:  Negative for cough and shortness of breath.    Cardiovascular: Negative.    Gastrointestinal: Negative.    Genitourinary: Negative.    Musculoskeletal:  Positive for  arthralgias.   Neurological:  Positive for numbness. Negative for dizziness, light-headedness and headaches.   Psychiatric/Behavioral:  Positive for sleep disturbance. The patient is not nervous/anxious.    Past Medical History:   Diagnosis Date   • Arthritis    • Diabetes mellitus (HCC)    • Difficulty walking    • DM (diabetes mellitus) (HCC)    • GERD (gastroesophageal reflux disease)    • Gout    • Hyperlipidemia    • Hypertension    • Osteoarthritis    • Sleep apnea      Past Surgical History:   Procedure Laterality Date   • HERNIA REPAIR     • KNEE ARTHROPLASTY Left 2024   • LAMINECTOMY     • TONSILLECTOMY       Social History     Socioeconomic History   • Marital status: /Civil Union     Spouse name: Not on file   • Number of children: Not on file   • Years of education: Not on file   • Highest education level: Not on file   Occupational History   • Not on file   Tobacco Use   • Smoking status: Former     Current packs/day: 0.00     Average packs/day: 0.5 packs/day for 10.0 years (5.0 ttl pk-yrs)     Types: Cigarettes     Start date: 1975     Quit date: 1985     Years since quittin.9   • Smokeless tobacco: Never   Vaping Use   • Vaping status: Never Used   Substance and Sexual Activity   • Alcohol use: Yes     Alcohol/week: 3.0 standard drinks of alcohol     Types: 3 Shots of liquor per week   • Drug use: Never   • Sexual activity: Yes     Partners: Female   Other Topics Concern   • Not on file   Social History Narrative   • Not on file     Social Drivers of Health     Financial Resource Strain: Low Risk  (3/1/2024)    Received from [facility]    Overall Financial Resource Strain (CARDIA)    • Difficulty of Paying Living Expenses: Not hard at all   Food Insecurity: No Food Insecurity (2024)    Nursing - Inadequate Food Risk Classification    • Worried About Running Out of Food in the Last Year: Never true    • Ran Out of Food in the Last Year: Never true    •  "Ran Out of Food in the Last Year: Not on file   Transportation Needs: No Transportation Needs (9/7/2024)    PRAPARE - Transportation    • Lack of Transportation (Medical): No    • Lack of Transportation (Non-Medical): No   Physical Activity: Not on file   Stress: Not on file   Social Connections: Not on file   Intimate Partner Violence: Not At Risk (3/1/2024)    Received from [facility]    Humiliation, Afraid, Rape, and Kick questionnaire    • Fear of Current or Ex-Partner: No    • Emotionally Abused: No    • Physically Abused: No    • Sexually Abused: No   Housing Stability: Low Risk  (9/7/2024)    Housing Stability Vital Sign    • Unable to Pay for Housing in the Last Year: No    • Number of Times Moved in the Last Year: 0    • Homeless in the Last Year: No     Allergies   Allergen Reactions   • Nuts - Food Allergy Anaphylaxis       Objective   /70   Pulse 75   Temp (!) 97.3 °F (36.3 °C) (Temporal)   Resp 18   Ht 5' 9\" (1.753 m)   Wt 107 kg (236 lb)   SpO2 97%   BMI 34.85 kg/m²      Physical Exam  Vitals and nursing note reviewed.   Constitutional:       General: He is not in acute distress.     Appearance: Normal appearance. He is not ill-appearing, toxic-appearing or diaphoretic.   HENT:      Head: Normocephalic and atraumatic.      Right Ear: External ear normal.      Left Ear: External ear normal.      Nose: Nose normal.      Mouth/Throat:      Mouth: Mucous membranes are moist.   Eyes:      General: No scleral icterus.     Extraocular Movements: Extraocular movements intact.      Pupils: Pupils are equal, round, and reactive to light.   Cardiovascular:      Rate and Rhythm: Normal rate and regular rhythm.      Pulses: Normal pulses.      Heart sounds: Normal heart sounds.   Pulmonary:      Effort: Pulmonary effort is normal. No respiratory distress.      Breath sounds: Normal breath sounds. No wheezing.   Abdominal:      General: Bowel sounds are normal. There is no distension. "      Palpations: Abdomen is soft.      Tenderness: There is no abdominal tenderness.   Musculoskeletal:         General: No swelling, tenderness or deformity.      Cervical back: Normal range of motion and neck supple.      Right lower leg: No edema.      Left lower leg: No edema.   Lymphadenopathy:      Cervical: No cervical adenopathy.   Skin:     General: Skin is warm and dry.      Coloration: Skin is not jaundiced or pale.   Neurological:      General: No focal deficit present.      Mental Status: He is alert and oriented to person, place, and time. Mental status is at baseline.      Cranial Nerves: No cranial nerve deficit.      Sensory: Sensory deficit present.      Coordination: Coordination normal.   Psychiatric:         Mood and Affect: Mood normal.         Behavior: Behavior normal.         Thought Content: Thought content normal.         Judgment: Judgment normal.

## 2025-01-15 ENCOUNTER — OFFICE VISIT (OUTPATIENT)
Dept: OBGYN CLINIC | Facility: CLINIC | Age: 70
End: 2025-01-15
Payer: MEDICARE

## 2025-01-15 VITALS
HEIGHT: 69 IN | OXYGEN SATURATION: 98 % | WEIGHT: 241.2 LBS | RESPIRATION RATE: 16 BRPM | TEMPERATURE: 97.9 F | BODY MASS INDEX: 35.73 KG/M2 | HEART RATE: 73 BPM

## 2025-01-15 DIAGNOSIS — M54.12 CERVICAL RADICULOPATHY: ICD-10-CM

## 2025-01-15 DIAGNOSIS — G56.21 CUBITAL TUNNEL SYNDROME ON RIGHT: Primary | ICD-10-CM

## 2025-01-15 PROCEDURE — 99204 OFFICE O/P NEW MOD 45 MIN: CPT | Performed by: STUDENT IN AN ORGANIZED HEALTH CARE EDUCATION/TRAINING PROGRAM

## 2025-01-15 NOTE — PROGRESS NOTES
The patient has a has a somewhat complex presentation and when she has numbness and tingling in the ulnar 2 digits.  On his history although he does have some elbow pain and does report that the numbness and tingling gets worse with flexion of the elbow, which is consistent with a cubital tunnel syndrome, he also reports that positioning his neck in certain ways without movement of the arm makes the numbness and tingling worse.  He also reports that the initial injury occurred with a neck injury and his symptoms have been persistent since that time.  On examination he does have provocative testing of the ulnar nerve at the elbow which would be consistent with a cubital tunnel syndrome.  However he also has some signs of radiculopathy.  I think therefore the patient could be experiencing a double crush of the ulnar nerve resulting in his symptoms.  I discussed all of this with him.  I did suggest that he start on conservative management for cubital tunnel syndrome as this will at least help with some of the provocative symptoms that he has.  We will additionally obtain an EMG as this will help confirm the diagnosis of cubital tunnel syndrome and give us an indication as he is also having radiculopathy.  He will follow-up after the EMG to discuss the findings and treatment options.

## 2025-01-15 NOTE — PROGRESS NOTES
ASSESSMENT/PLAN:    1. Cubital tunnel syndrome on right  Ambulatory Referral to Orthopedic Surgery      2. Cervical radiculopathy            69 y.o. male presents with signs and symptoms consistent with double crush of right elbow cubital tunnel as well as a right cervical radiculopathy.  The patient had a somewhat complex presentation in terms of the provocative symptoms.  He had an injury where prior to the injury he had no numbness sensations but specifically after lifting an object and feeling a pop in his neck he started develop the symptoms.  Since that time he can position his neck alone to recreate the numbness and tingling in the arm.  Additionally on physical examination he is Spurling maneuver positive.  All of these point to him having a likely cervical radiculopathy.  The MRI imaging that he has demonstrates severe multilevel bilateral middle foraminal narrowing which could be explained with this.  However additionally on examination he does have evidence of cubital tunnel syndrome.  Specifically there is evidence of irritation of the ulnar nerve over the elbow with positive scratch collapse testing positive Tinel over the ulnar nerve at the elbow.  He also describes a history in which he can also flex his elbow in this position leads to the numbness and tingling that he has in his hand.  Therefore I do believe he has a combination of issues.      The anatomy and physiology of cubital tunnel syndrome were discussed with the patient today in the office.  Typically, increased elbow flexion activities decrease blood flow within the intraneural spaces, resulting in a feeling of numbness, tingling, weakness, or clumsiness within the hand and fingers.  Occasionally, anatomic structures such as medial elbow osteophytes, the medial head of the triceps, were subluxing ulnar nerve may result in increased pressure or aggravation at the cubital tunnel.  Typical signs and symptoms usually include numbness and  tingling within the ring and small finger, weakness with , and weakness with pinch.  Conservative treatment and includes nocturnal bracing to keep the elbow in a semi-extended position, activity modification, therapy, and avoiding excessive elbow flexion activities.  Vitamin B6 one tablet daily over the counter may helpful to reduce symptoms.  A majority of patients typically respond to conservative treatment over a period of approximately 3-6 months.  EMG/NCV testing of the ulnar nerve at the elbow is not as reliable as carpal tunnel syndrome.  Surgical intervention in the form of in situ release of the ulnar nerve at the elbow or ulnar nerve transposition may be required in up to 20% of patients.  At this time we will start the patient on conservative management for the cubital tunnel syndrome.  We will obtain an EMG to further evaluate this given the likely cervical radiculopathy.      After a thorough discussion of risks, benefits, and alternatives, patient is indicated for non-operative treatment. Reviewed previous A1C.  I would like to obtain an EMG to evaluate the ulnar nerve function.   Follow up 1 week after EMG or if symptoms significantly worsen  If the symptoms do not improve, they may require surgical intervention in form of carpal tunnel release.    Patient/Guardian verbalizes understanding and consent to treatment plan. All questions answered.      _____________________________________________________  CHIEF COMPLAINT:  Right hand pain, numbness and tingling.     Referred By:  Jacqueline Ventura, Do  [address] [place] [address]    SUBJECTIVE:  Jovany Little is a 69 y.o. right handed  male who presents today for evaluation of right hand. He reports that they have had numbness/tingling in their ulnar nerve distribution for 5 months. He was doing yard work and felt a pinch in his right upper back. Later in the day he started having pain in the right elbow. Notes affecting their ADLs. He  has completed formal PT for his cervical spine in September for 6-8 weeks. He notes minimal improvements in the last 4 weeks.  He has not received any cervical injections.  He has no difficulty with fine motor tasks.  He has not any splinting or injections of the elbow.  He also notes that he is able to recreate the numbness and tingling in his arm simply by positioning his neck in specific positions.  Additionally with his neck in a neutral nonprovocative position he is then able to flex his elbow and with enough time this leads to the symptoms as well.    Location of the pain: right hand  Quality of  pain: burning and tingling.   Severity of the pain: 2/10.   Aggravating symptoms:activity and positional.   Night symptoms: yes  Tingling: yes  Constant numbness: no  Dropping objects: no  Increase in symptoms with increase use : yes    The treatment so far includes: avoidance of offending activity, PT which was temporarily effective, Gabatentin and Muscle Relaxers. Celebrex and Ibuprofen ineffctive    Occupation: Retired.     ADLs: Community ambulator  Smoke: No   ETOH: Yes-Socially   Drugs:  No  Job: Retired         PAST MEDICAL HISTORY:  Past Medical History:   Diagnosis Date    Arthritis     Diabetes mellitus (HCC)     Difficulty walking     DM (diabetes mellitus) (HCC)     GERD (gastroesophageal reflux disease)     Gout     Hyperlipidemia     Hypertension     Osteoarthritis     Sleep apnea        PAST SURGICAL HISTORY:  Past Surgical History:   Procedure Laterality Date    HERNIA REPAIR      KNEE ARTHROPLASTY Left 03/01/2024    LAMINECTOMY      TONSILLECTOMY         FAMILY HISTORY:  Family History   Problem Relation Age of Onset    Hypertension Mother     Colon cancer Mother     Cancer Mother         Colon Cancer    Hypertension Father     Stroke Father     Prostate cancer Brother        SOCIAL HISTORY:  Social History     Tobacco Use    Smoking status: Former     Current packs/day: 0.00     Average packs/day:  0.5 packs/day for 10.0 years (5.0 ttl pk-yrs)     Types: Cigarettes     Start date: 1975     Quit date: 1985     Years since quittin.0    Smokeless tobacco: Never   Vaping Use    Vaping status: Never Used   Substance Use Topics    Alcohol use: Yes     Alcohol/week: 3.0 standard drinks of alcohol     Types: 3 Shots of liquor per week    Drug use: Never       MEDICATIONS:    Current Outpatient Medications:     allopurinol (ZYLOPRIM) 300 mg tablet, Take 1 tablet (300 mg total) by mouth daily, Disp: 90 tablet, Rfl: 3    atorvastatin (LIPITOR) 80 mg tablet, Take 1 tablet (80 mg total) by mouth daily, Disp: 90 tablet, Rfl: 3    celecoxib (CeleBREX) 200 mg capsule, Take 1 capsule (200 mg total) by mouth daily, Disp: 30 capsule, Rfl: 0    gabapentin (NEURONTIN) 300 mg capsule, Take 1 capsule (300 mg total) by mouth 2 (two) times a day (Patient taking differently: Take 300 mg by mouth), Disp: 60 capsule, Rfl: 5    hydroCHLOROthiazide 25 mg tablet, Take 2 tablets (50 mg total) by mouth daily, Disp: 180 tablet, Rfl: 3    losartan (COZAAR) 50 mg tablet, Take 1 tablet (50 mg total) by mouth daily, Disp: 90 tablet, Rfl: 3    metFORMIN (GLUCOPHAGE-XR) 500 mg 24 hr tablet, Take 1 tablet (500 mg total) by mouth 2 (two) times a day with meals, Disp: 180 tablet, Rfl: 3    methocarbamol (ROBAXIN) 500 mg tablet, Take 1 tablet (500 mg total) by mouth 2 (two) times a day as needed for muscle spasms, Disp: 30 tablet, Rfl: 0    pantoprazole (PROTONIX) 40 mg tablet, Take 1 tablet (40 mg total) by mouth daily, Disp: 90 tablet, Rfl: 3    VITAMIN D PO, Take 2,000 Int'l Units by mouth daily, Disp: , Rfl:     cephalexin (KEFLEX) 500 mg capsule, , Disp: , Rfl:     ALLERGIES:  Allergies   Allergen Reactions    Nuts - Food Allergy Anaphylaxis       REVIEW OF SYSTEMS:  Pertinent items are noted in HPI.  A comprehensive review of systems was negative.    LABS:  HgA1c:   Lab Results   Component Value Date    HGBA1C 6.5 (H) 12/10/2024  "    BMP:   Lab Results   Component Value Date    CALCIUM 10.2 12/10/2024    K 4.0 12/10/2024    CO2 27 12/10/2024     12/10/2024    BUN 26 (H) 12/10/2024    CREATININE 1.13 12/10/2024         _____________________________________________________  PHYSICAL EXAMINATION:  Pulse 73   Temp 97.9 °F (36.6 °C) (Temporal)   Resp 16   Ht 5' 9\" (1.753 m)   Wt 109 kg (241 lb 3.2 oz)   SpO2 98%   BMI 35.62 kg/m²     Gen: A&Ox3, NAD  Cardiac: regular rate  Chest: non labored breathing  Abdomen: Non-distended    Cervcial Spine: Positive Negative Spurling's    MUSCULOSKELETAL EXAMINATION:  Right Hand  Digital motion is full    FDS/FDP/FPL/finger extensors intact     negative Finkelstein's  negative Triggering     Neuro Exam:  negative CT Durkan's compression test on the right  negative Tinel at CT, forearm,  and [place] on the right  There is no atrophy of the thenar muscles.  5/5 APB strength   5/5 FPL strength   5/5 First dorsal IO strength   5/5 Abd-DQ strength   5/5 EPL strength  positive tinel at the elbow on the right  Negative flexion compression of the elbow  negative pain to palpation over the ulnar nerve  Negative Fernández's   Positive Scratch Collapse  Sensation intact to light touch in FDWS (radial), volar IF (median), volar SF (ulnar)  Two-Point Discrimination testing   Right radial (mm) Right ulnar (mm) Left radial (mm) Left ulnar (mm)   Thumb 5 5 5 5   Index 5 5 5 5   Long 5 5 5 5   Ring 5 5 5 5   Small 5 5 5 5     Vascular Exam: < 2 sec capillary refill     _____________________________________________________  STUDIES REVIEWED:  I reviewed imaging in PACS 10/31/2024 of his cervical spine which demonstrates multilevel cervical disease with foraminal stenosis at multiple levels but there is specifically foraminal stenosis on the patient right side at C5-C6, C6-C7, and C7-T1 which could be contributing to the patient's disease.        Scribe Attestation      I,:  Delaney Avitia am acting as a scribe " while in the presence of the attending physician.:       I,:  Shine Chanel MD personally performed the services described in this documentation    as scribed in my presence.:

## 2025-02-10 DIAGNOSIS — E66.812 CLASS 2 OBESITY DUE TO EXCESS CALORIES IN ADULT, UNSPECIFIED BMI, UNSPECIFIED WHETHER SERIOUS COMORBIDITY PRESENT: ICD-10-CM

## 2025-02-10 DIAGNOSIS — I35.8 AORTIC VALVE SCLEROSIS: ICD-10-CM

## 2025-02-10 DIAGNOSIS — E11.9 TYPE 2 DIABETES MELLITUS WITHOUT COMPLICATION, WITHOUT LONG-TERM CURRENT USE OF INSULIN (HCC): ICD-10-CM

## 2025-02-10 DIAGNOSIS — E78.2 MIXED HYPERLIPIDEMIA: ICD-10-CM

## 2025-02-10 DIAGNOSIS — E66.09 CLASS 2 OBESITY DUE TO EXCESS CALORIES IN ADULT, UNSPECIFIED BMI, UNSPECIFIED WHETHER SERIOUS COMORBIDITY PRESENT: ICD-10-CM

## 2025-02-10 DIAGNOSIS — I10 BENIGN ESSENTIAL HYPERTENSION: ICD-10-CM

## 2025-02-10 RX ORDER — ATORVASTATIN CALCIUM 80 MG/1
80 TABLET, FILM COATED ORAL DAILY
Qty: 90 TABLET | Refills: 1 | Status: SHIPPED | OUTPATIENT
Start: 2025-02-10

## 2025-02-11 ENCOUNTER — HOSPITAL ENCOUNTER (OUTPATIENT)
Dept: NEUROLOGY | Facility: CLINIC | Age: 70
Discharge: HOME | End: 2025-02-11
Payer: MEDICARE

## 2025-02-11 DIAGNOSIS — M54.12 CERVICAL RADICULOPATHY: ICD-10-CM

## 2025-02-11 DIAGNOSIS — G56.21 CUBITAL TUNNEL SYNDROME ON RIGHT: ICD-10-CM

## 2025-02-11 PROCEDURE — 95886 MUSC TEST DONE W/N TEST COMP: CPT | Performed by: PSYCHIATRY & NEUROLOGY

## 2025-02-11 PROCEDURE — 95909 NRV CNDJ TST 5-6 STUDIES: CPT | Performed by: PSYCHIATRY & NEUROLOGY

## 2025-02-19 ENCOUNTER — OFFICE VISIT (OUTPATIENT)
Dept: OBGYN CLINIC | Facility: CLINIC | Age: 70
End: 2025-02-19
Payer: MEDICARE

## 2025-02-19 VITALS
HEART RATE: 71 BPM | RESPIRATION RATE: 16 BRPM | TEMPERATURE: 97.8 F | HEIGHT: 69 IN | BODY MASS INDEX: 35.28 KG/M2 | OXYGEN SATURATION: 98 % | WEIGHT: 238.2 LBS

## 2025-02-19 DIAGNOSIS — R20.0 NUMBNESS AND TINGLING IN RIGHT HAND: Primary | ICD-10-CM

## 2025-02-19 DIAGNOSIS — R20.2 NUMBNESS AND TINGLING IN RIGHT HAND: Primary | ICD-10-CM

## 2025-02-19 PROCEDURE — 99213 OFFICE O/P EST LOW 20 MIN: CPT | Performed by: STUDENT IN AN ORGANIZED HEALTH CARE EDUCATION/TRAINING PROGRAM

## 2025-02-19 NOTE — PROGRESS NOTES
Orthopedic Surgery Management Note  Jovany Little (69 y.o. male)  : 1955 Encounter Date: 2025      Assessment and Plan:  1. Numbness and tingling in right hand            69 y.o. male presents in follow up for the above diagnosis.  The patient likely had a transient ulnar neuropraxia due to his injury.  The MRI was largely normal which I think his injury had pretty much resolved prior to receiving the examination.  Discussed with patient that if his symptoms worsen he should come back for evaluation.    EMG of right hand was reviewed in the office today.   Discussed that patient likely had a neuropraxia that has been improving.     he expressed understanding of the plan and agreed. We encouraged them to contact our office with any questions or concerns.     Return if symptoms worsen or fail to improve.        Chief Complaint:     Right hand followup    Previous History:   Patient was last seen in the office on 1/15/2025 where EMG was ordered.     Interval History:  The patient did obtain his EMG since being seen last.  He also notably has had a great improvement in his numbness in his tingling.  He reports that his symptoms are almost completely better and rather rarely bother him now.    Past Medical History:  Past Medical History:   Diagnosis Date    Arthritis     Diabetes mellitus (HCC)     Difficulty walking     DM (diabetes mellitus) (HCC)     GERD (gastroesophageal reflux disease)     Gout     Hyperlipidemia     Hypertension     Osteoarthritis     Sleep apnea      Past Surgical History:   Procedure Laterality Date    HERNIA REPAIR      KNEE ARTHROPLASTY Left 2024    LAMINECTOMY      TONSILLECTOMY       Family History   Problem Relation Age of Onset    Hypertension Mother     Colon cancer Mother     Cancer Mother         Colon Cancer    Hypertension Father     Stroke Father     Prostate cancer Brother      Social History     Socioeconomic History    Marital status: /Civil Union      Spouse name: Not on file    Number of children: Not on file    Years of education: Not on file    Highest education level: Not on file   Occupational History    Not on file   Tobacco Use    Smoking status: Former     Current packs/day: 0.00     Average packs/day: 0.5 packs/day for 10.0 years (5.0 ttl pk-yrs)     Types: Cigarettes     Start date: 1975     Quit date: 1985     Years since quittin.1    Smokeless tobacco: Never   Vaping Use    Vaping status: Never Used   Substance and Sexual Activity    Alcohol use: Yes     Alcohol/week: 3.0 standard drinks of alcohol     Types: 3 Shots of liquor per week    Drug use: Never    Sexual activity: Yes     Partners: Female   Other Topics Concern    Not on file   Social History Narrative    Not on file     Social Drivers of Health     Financial Resource Strain: Low Risk  (3/1/2024)    Received from [facility]    Overall Financial Resource Strain (CARDIA)     Difficulty of Paying Living Expenses: Not hard at all   Food Insecurity: No Food Insecurity (2024)    Nursing - Inadequate Food Risk Classification     Worried About Running Out of Food in the Last Year: Never true     Ran Out of Food in the Last Year: Never true     Ran Out of Food in the Last Year: Not on file   Transportation Needs: No Transportation Needs (2024)    PRAPARE - Transportation     Lack of Transportation (Medical): No     Lack of Transportation (Non-Medical): No   Physical Activity: Not on file   Stress: Not on file   Social Connections: Not on file   Intimate Partner Violence: Not At Risk (3/1/2024)    [facility]    Humiliation, Afraid, Rape, and Kick questionnaire     Fear of Current or Ex-Partner: No     Emotionally Abused: No     Physically Abused: No     Sexually Abused: No   Housing Stability: Low Risk  (2024)    Housing Stability Vital Sign     Unable to Pay for Housing in the Last Year: No      "Number of Times Moved in the Last Year: 0     Homeless in the Last Year: No     Scheduled Meds:  Continuous Infusions:No current facility-administered medications for this visit.    PRN Meds:.  Allergies   Allergen Reactions    Nuts - Food Allergy Anaphylaxis       Physical Examination:    Pulse 71   Temp 97.8 °F (36.6 °C) (Temporal)   Resp 16   Ht 5' 9\" (1.753 m)   Wt 108 kg (238 lb 3.2 oz)   SpO2 98%   BMI 35.18 kg/m²     Gen: A&Ox3, NAD  Cardiac: regular rate  Chest: non labored breathing  Abdomen: Non-distended      Right Upper Extremity:  Skin CDI  No obvious deformity of the shoulder, arm, elbow, forearm, wrist, hand  Sensation intact to light touch in the axillary median, ulnar, and radial nerve distributions  NV intact      Studies:  Radiographs: I personally reviewed and independently interpreted the available radiographs.   EMG, right upper extremity, 2/11/2025: Normal EMG and nerve conduction study of the right arm. There is no electrical evidence of ulnar mononeuropathy or cervical radiculopathy.         Shine Chanel MD  Hand and Upper Extremity Surgery      *This note was dictated using Dragon voice recognition software. Please excuse any word substitutions or errors.*      Scribe Attestation      I,:  Kristal Fraire PA-C am acting as a scribe while in the presence of the attending physician.:       I,:  Shine Chanel MD personally performed the services described in this documentation    as scribed in my presence.:             "

## 2025-03-07 ENCOUNTER — RA CDI HCC (OUTPATIENT)
Dept: OTHER | Facility: HOSPITAL | Age: 70
End: 2025-03-07

## 2025-03-07 DIAGNOSIS — E66.01 MORBID OBESITY (HCC): Primary | ICD-10-CM

## 2025-03-07 DIAGNOSIS — M15.0 PRIMARY OSTEOARTHRITIS INVOLVING MULTIPLE JOINTS: ICD-10-CM

## 2025-03-07 PROBLEM — E66.812 CLASS 2 OBESITY DUE TO EXCESS CALORIES IN ADULT: Status: RESOLVED | Noted: 2020-10-19 | Resolved: 2025-03-07

## 2025-03-07 PROBLEM — E66.09 CLASS 2 OBESITY DUE TO EXCESS CALORIES IN ADULT: Status: RESOLVED | Noted: 2020-10-19 | Resolved: 2025-03-07

## 2025-03-07 RX ORDER — CELECOXIB 200 MG/1
200 CAPSULE ORAL DAILY
Qty: 30 CAPSULE | Refills: 3 | Status: SHIPPED | OUTPATIENT
Start: 2025-03-07

## 2025-03-07 NOTE — PROGRESS NOTES
HCC coding opportunities          Chart Reviewed number of suggestions sent to Provider: 2     Patients Insurance     Medicare Insurance: Medicare        E11.36  E66.01

## 2025-03-11 ENCOUNTER — APPOINTMENT (OUTPATIENT)
Dept: LAB | Facility: MEDICAL CENTER | Age: 70
End: 2025-03-11
Payer: MEDICARE

## 2025-03-11 DIAGNOSIS — Z79.4 TYPE 2 DIABETES MELLITUS WITH CHRONIC KIDNEY DISEASE, WITH LONG-TERM CURRENT USE OF INSULIN, UNSPECIFIED CKD STAGE (HCC): ICD-10-CM

## 2025-03-11 DIAGNOSIS — E11.22 TYPE 2 DIABETES MELLITUS WITH CHRONIC KIDNEY DISEASE, WITH LONG-TERM CURRENT USE OF INSULIN, UNSPECIFIED CKD STAGE (HCC): ICD-10-CM

## 2025-03-11 LAB
ALBUMIN SERPL BCG-MCNC: 4.4 G/DL (ref 3.5–5)
ALP SERPL-CCNC: 71 U/L (ref 34–104)
ALT SERPL W P-5'-P-CCNC: 25 U/L (ref 7–52)
ANION GAP SERPL CALCULATED.3IONS-SCNC: 9 MMOL/L (ref 4–13)
AST SERPL W P-5'-P-CCNC: 27 U/L (ref 13–39)
BILIRUB SERPL-MCNC: 0.94 MG/DL (ref 0.2–1)
BUN SERPL-MCNC: 20 MG/DL (ref 5–25)
CALCIUM SERPL-MCNC: 10.8 MG/DL (ref 8.4–10.2)
CHLORIDE SERPL-SCNC: 100 MMOL/L (ref 96–108)
CO2 SERPL-SCNC: 29 MMOL/L (ref 21–32)
CREAT SERPL-MCNC: 1.01 MG/DL (ref 0.6–1.3)
EST. AVERAGE GLUCOSE BLD GHB EST-MCNC: 140 MG/DL
GFR SERPL CREATININE-BSD FRML MDRD: 75 ML/MIN/1.73SQ M
GLUCOSE P FAST SERPL-MCNC: 115 MG/DL (ref 65–99)
HBA1C MFR BLD: 6.5 %
POTASSIUM SERPL-SCNC: 3.5 MMOL/L (ref 3.5–5.3)
PROT SERPL-MCNC: 6.8 G/DL (ref 6.4–8.4)
SODIUM SERPL-SCNC: 138 MMOL/L (ref 135–147)
TRIGL SERPL-MCNC: 197 MG/DL (ref ?–150)

## 2025-03-11 PROCEDURE — 83036 HEMOGLOBIN GLYCOSYLATED A1C: CPT

## 2025-03-11 PROCEDURE — 36415 COLL VENOUS BLD VENIPUNCTURE: CPT

## 2025-03-11 PROCEDURE — 84478 ASSAY OF TRIGLYCERIDES: CPT

## 2025-03-11 PROCEDURE — 80053 COMPREHEN METABOLIC PANEL: CPT

## 2025-03-14 ENCOUNTER — OFFICE VISIT (OUTPATIENT)
Dept: FAMILY MEDICINE CLINIC | Facility: CLINIC | Age: 70
End: 2025-03-14
Payer: MEDICARE

## 2025-03-14 VITALS
HEART RATE: 60 BPM | SYSTOLIC BLOOD PRESSURE: 124 MMHG | TEMPERATURE: 97.2 F | DIASTOLIC BLOOD PRESSURE: 78 MMHG | WEIGHT: 231.8 LBS | HEIGHT: 69 IN | OXYGEN SATURATION: 96 % | RESPIRATION RATE: 18 BRPM | BODY MASS INDEX: 34.33 KG/M2

## 2025-03-14 DIAGNOSIS — G95.89 CERVICAL CORD MYELOMALACIA (HCC): Primary | ICD-10-CM

## 2025-03-14 DIAGNOSIS — E83.52 HYPERCALCEMIA: ICD-10-CM

## 2025-03-14 DIAGNOSIS — Z86.0100 HISTORY OF COLON POLYPS: ICD-10-CM

## 2025-03-14 DIAGNOSIS — Z79.4 TYPE 2 DIABETES MELLITUS WITH CHRONIC KIDNEY DISEASE, WITH LONG-TERM CURRENT USE OF INSULIN, UNSPECIFIED CKD STAGE (HCC): ICD-10-CM

## 2025-03-14 DIAGNOSIS — E66.812 CLASS 2 SEVERE OBESITY DUE TO EXCESS CALORIES WITH SERIOUS COMORBIDITY IN ADULT, UNSPECIFIED BMI (HCC): ICD-10-CM

## 2025-03-14 DIAGNOSIS — E11.22 TYPE 2 DIABETES MELLITUS WITH CHRONIC KIDNEY DISEASE, WITH LONG-TERM CURRENT USE OF INSULIN, UNSPECIFIED CKD STAGE (HCC): ICD-10-CM

## 2025-03-14 DIAGNOSIS — E66.01 CLASS 2 SEVERE OBESITY DUE TO EXCESS CALORIES WITH SERIOUS COMORBIDITY IN ADULT, UNSPECIFIED BMI (HCC): ICD-10-CM

## 2025-03-14 PROCEDURE — 99214 OFFICE O/P EST MOD 30 MIN: CPT | Performed by: INTERNAL MEDICINE

## 2025-03-14 PROCEDURE — G2211 COMPLEX E/M VISIT ADD ON: HCPCS | Performed by: INTERNAL MEDICINE

## 2025-03-14 NOTE — PROGRESS NOTES
Name: Jovany Little      : 1955      MRN: [id number]  Encounter Provider: Jacqueline Ventura DO  Encounter Date: 3/14/2025   Encounter department: [facility]  :  Assessment & Plan  Cervical cord myelomalacia (HCC)  Pt does daily home exercise and sxs are managed for the most part as long as he is conscious of his activities/limits        Class 2 severe obesity due to excess calories with serious comorbidity in adult, unspecified BMI (HCC)  Lo fat diet and exercise He will be more active with spring weather upcoming        Type 2 diabetes mellitus with chronic kidney disease, with long-term current use of insulin, unspecified CKD stage (HCC)    Lab Results   Component Value Date    HGBA1C 6.5 (H) 2025   A1c stable range and he will be more active in spring weather          History of colon polyps    Orders:  •  Ambulatory Referral to Gastroenterology; Future  Pt agrees to setup repeat colon screen locally Referral placed   Hypercalcemia    Orders:  •  Calcium, ionized; Future  •  PTH, intact; Future  Recheck labs Suspect external ingestion/hydration cause for increase         Depression Screening and Follow-up Plan: Patient was screened for depression during today's encounter. They screened negative with a PHQ-2 score of 0.      History of Present Illness   HPI  Pt doing generally well Neck sxs are better than they were and he does daily exercises following PT No chest pain or sob He will start walking and increase outdoor activities as weather improves No recent illness No acute issues Had recent labs to review   Review of Systems   Constitutional:  Negative for chills and fever.   HENT: Negative.     Eyes:  Negative for visual disturbance.   Respiratory:  Negative for cough and shortness of breath.    Cardiovascular: Negative.    Gastrointestinal: Negative.    Genitourinary: Negative.    Musculoskeletal:  Positive for arthralgias and neck stiffness.   Neurological: Negative.  Negative  for dizziness, light-headedness and headaches.   Psychiatric/Behavioral:  Negative for sleep disturbance. The patient is not nervous/anxious.      Past Medical History:   Diagnosis Date   • Arthritis    • Diabetes mellitus (HCC)    • Difficulty walking    • DM (diabetes mellitus) (HCC)    • GERD (gastroesophageal reflux disease)    • Gout    • Hyperlipidemia    • Hypertension    • Osteoarthritis    • Sleep apnea      Past Surgical History:   Procedure Laterality Date   • HERNIA REPAIR     • KNEE ARTHROPLASTY Left 2024   • LAMINECTOMY     • TONSILLECTOMY       Social History     Socioeconomic History   • Marital status: /Civil Union     Spouse name: Not on file   • Number of children: Not on file   • Years of education: Not on file   • Highest education level: Not on file   Occupational History   • Not on file   Tobacco Use   • Smoking status: Former     Current packs/day: 0.00     Average packs/day: 0.5 packs/day for 10.0 years (5.0 ttl pk-yrs)     Types: Cigarettes     Start date: 1975     Quit date: 1985     Years since quittin.2   • Smokeless tobacco: Never   Vaping Use   • Vaping status: Never Used   Substance and Sexual Activity   • Alcohol use: Yes     Alcohol/week: 3.0 standard drinks of alcohol     Types: 3 Shots of liquor per week   • Drug use: Never   • Sexual activity: Yes     Partners: Female   Other Topics Concern   • Not on file   Social History Narrative   • Not on file     Social Drivers of Health     Financial Resource Strain: Low Risk  (3/1/2024)    [facility]    Overall Financial Resource Strain (CARDIA)    • Difficulty of Paying Living Expenses: Not hard at all   Food Insecurity: No Food Insecurity (2024)    Nursing - Inadequate Food Risk Classification    • Worried About Running Out of Food in the Last Year: Never true    • Ran Out of Food in the Last Year: Never true    • Ran Out of Food in the Last Year: Not on file   Transportation  "Needs: No Transportation Needs (9/7/2024)    PRAPARE - Transportation    • Lack of Transportation (Medical): No    • Lack of Transportation (Non-Medical): No   Physical Activity: Not on file   Stress: Not on file   Social Connections: Not on file   Intimate Partner Violence: Not At Risk (3/1/2024)    [facility]    Humiliation, Afraid, Rape, and Kick questionnaire    • Fear of Current or Ex-Partner: No    • Emotionally Abused: No    • Physically Abused: No    • Sexually Abused: No   Housing Stability: Low Risk  (9/7/2024)    Housing Stability Vital Sign    • Unable to Pay for Housing in the Last Year: No    • Number of Times Moved in the Last Year: 0    • Homeless in the Last Year: No     Allergies   Allergen Reactions   • Nuts - Food Allergy Anaphylaxis       Objective   /78   Pulse 60   Temp (!) 97.2 °F (36.2 °C) (Temporal)   Resp 18   Ht 5' 9\" (1.753 m)   Wt 105 kg (231 lb 12.8 oz)   SpO2 96%   BMI 34.23 kg/m²      Physical Exam  Vitals and nursing note reviewed.   Constitutional:       General: He is not in acute distress.     Appearance: Normal appearance. He is not ill-appearing, toxic-appearing or diaphoretic.   HENT:      Head: Normocephalic and atraumatic.      Right Ear: External ear normal.      Left Ear: External ear normal.      Nose: Nose normal.      Mouth/Throat:      Mouth: Mucous membranes are moist.   Eyes:      Extraocular Movements: Extraocular movements intact.      Pupils: Pupils are equal, round, and reactive to light.   Cardiovascular:      Rate and Rhythm: Normal rate and regular rhythm.      Pulses: Normal pulses.      Heart sounds: Normal heart sounds.   Pulmonary:      Effort: Pulmonary effort is normal. No respiratory distress.      Breath sounds: Normal breath sounds. No wheezing.   Abdominal:      General: Bowel sounds are normal. There is no distension.      Palpations: Abdomen is soft.      Tenderness: There " is no abdominal tenderness.   Musculoskeletal:         General: Tenderness present.      Cervical back: Normal range of motion and neck supple.      Right lower leg: No edema.      Left lower leg: No edema.   Lymphadenopathy:      Cervical: No cervical adenopathy.   Skin:     General: Skin is warm and dry.      Coloration: Skin is not jaundiced or pale.   Neurological:      General: No focal deficit present.      Mental Status: He is alert and oriented to person, place, and time. Mental status is at baseline.      Cranial Nerves: No cranial nerve deficit.   Psychiatric:         Mood and Affect: Mood normal.         Behavior: Behavior normal.         Thought Content: Thought content normal.         Judgment: Judgment normal.

## 2025-03-14 NOTE — ASSESSMENT & PLAN NOTE
Pt does daily home exercise and sxs are managed for the most part as long as he is conscious of his activities/limits

## 2025-03-14 NOTE — ASSESSMENT & PLAN NOTE
Lab Results   Component Value Date    HGBA1C 6.5 (H) 03/11/2025   A1c stable range and he will be more active in spring weather

## 2025-03-25 ENCOUNTER — PREP FOR PROCEDURE (OUTPATIENT)
Age: 70
End: 2025-03-25

## 2025-03-25 ENCOUNTER — TELEPHONE (OUTPATIENT)
Age: 70
End: 2025-03-25

## 2025-03-25 DIAGNOSIS — Z86.0100 HISTORY OF COLON POLYPS: Primary | ICD-10-CM

## 2025-03-25 NOTE — TELEPHONE ENCOUNTER
Scheduled date of colonoscopy (as of today):5/13/2025  Physician performing colonoscopy: Dr.Jaiyeola  Location of colonoscopy: [place]  Bowel prep reviewed with patient: Pili Son  Instructions reviewed with patient by: Pili Son  Clearances: N/A    Abdiaziz Dul prep sent via [employer]

## 2025-03-25 NOTE — TELEPHONE ENCOUNTER
03/25/25  Screened by: Pili Son    Referring Provider     Pre- Screening:     There is no height or weight on file to calculate BMI.  Has patient been referred for a routine screening Colonoscopy? yes  Is the patient between 45-75 years old? yes      Previous Colonoscopy yes   If yes:    Date: 5/5/2020    Facility: [facility]    Reason: Screening          Does the patient want to see a Gastroenterologist prior to their procedure OR are they having any GI symptoms? no    Has the patient been hospitalized or had abdominal surgery in the past 6 months? no    Does the patient use supplemental oxygen? no    Does the patient take Coumadin, Lovenox, Plavix, Elliquis, Xarelto, or other blood thinning medication? no    Has the patient had a stroke, cardiac event, or stent placed in the past year? no        If patient is between 45yrs - 49yrs, please advise patient that we will have to confirm benefits & coverage with their insurance company for a routine screening colonoscopy.

## 2025-04-24 LAB
LEFT EYE DIABETIC RETINOPATHY: NORMAL
RIGHT EYE DIABETIC RETINOPATHY: NORMAL

## 2025-05-12 RX ORDER — SODIUM CHLORIDE, SODIUM LACTATE, POTASSIUM CHLORIDE, CALCIUM CHLORIDE 600; 310; 30; 20 MG/100ML; MG/100ML; MG/100ML; MG/100ML
20 INJECTION, SOLUTION INTRAVENOUS CONTINUOUS
Status: CANCELLED | OUTPATIENT
Start: 2025-05-12

## 2025-05-13 ENCOUNTER — ANESTHESIA EVENT (OUTPATIENT)
Dept: PERIOP | Facility: HOSPITAL | Age: 70
End: 2025-05-13
Payer: MEDICARE

## 2025-05-13 ENCOUNTER — ANESTHESIA (OUTPATIENT)
Dept: PERIOP | Facility: HOSPITAL | Age: 70
End: 2025-05-13
Payer: MEDICARE

## 2025-05-13 ENCOUNTER — HOSPITAL ENCOUNTER (OUTPATIENT)
Dept: PERIOP | Facility: HOSPITAL | Age: 70
Setting detail: OUTPATIENT SURGERY
Discharge: HOME | End: 2025-05-13
Attending: INTERNAL MEDICINE
Payer: MEDICARE

## 2025-05-13 VITALS
DIASTOLIC BLOOD PRESSURE: 77 MMHG | TEMPERATURE: 97.9 F | WEIGHT: 223 LBS | RESPIRATION RATE: 18 BRPM | HEART RATE: 80 BPM | HEIGHT: 69 IN | OXYGEN SATURATION: 94 % | BODY MASS INDEX: 33.03 KG/M2 | SYSTOLIC BLOOD PRESSURE: 133 MMHG

## 2025-05-13 DIAGNOSIS — Z86.0100 HISTORY OF COLON POLYPS: ICD-10-CM

## 2025-05-13 LAB — GLUCOSE SERPL-MCNC: 119 MG/DL (ref 65–140)

## 2025-05-13 PROCEDURE — 82948 REAGENT STRIP/BLOOD GLUCOSE: CPT

## 2025-05-13 PROCEDURE — 88305 TISSUE EXAM BY PATHOLOGIST: CPT | Performed by: PATHOLOGY

## 2025-05-13 RX ORDER — PROPOFOL 10 MG/ML
INJECTION, EMULSION INTRAVENOUS AS NEEDED
Status: DISCONTINUED | OUTPATIENT
Start: 2025-05-13 | End: 2025-05-13

## 2025-05-13 RX ORDER — SODIUM CHLORIDE, SODIUM LACTATE, POTASSIUM CHLORIDE, CALCIUM CHLORIDE 600; 310; 30; 20 MG/100ML; MG/100ML; MG/100ML; MG/100ML
INJECTION, SOLUTION INTRAVENOUS CONTINUOUS PRN
Status: DISCONTINUED | OUTPATIENT
Start: 2025-05-13 | End: 2025-05-13

## 2025-05-13 RX ORDER — MIDAZOLAM HYDROCHLORIDE 2 MG/2ML
INJECTION, SOLUTION INTRAMUSCULAR; INTRAVENOUS AS NEEDED
Status: DISCONTINUED | OUTPATIENT
Start: 2025-05-13 | End: 2025-05-13

## 2025-05-13 RX ORDER — KETAMINE HCL IN NACL, ISO-OSM 100MG/10ML
SYRINGE (ML) INJECTION AS NEEDED
Status: DISCONTINUED | OUTPATIENT
Start: 2025-05-13 | End: 2025-05-13

## 2025-05-13 RX ORDER — GLYCOPYRROLATE 0.2 MG/ML
INJECTION INTRAMUSCULAR; INTRAVENOUS AS NEEDED
Status: DISCONTINUED | OUTPATIENT
Start: 2025-05-13 | End: 2025-05-13

## 2025-05-13 RX ORDER — LIDOCAINE HYDROCHLORIDE 10 MG/ML
INJECTION, SOLUTION EPIDURAL; INFILTRATION; INTRACAUDAL; PERINEURAL AS NEEDED
Status: DISCONTINUED | OUTPATIENT
Start: 2025-05-13 | End: 2025-05-13

## 2025-05-13 RX ADMIN — LIDOCAINE HYDROCHLORIDE 50 MG: 10 INJECTION, SOLUTION EPIDURAL; INFILTRATION; INTRACAUDAL; PERINEURAL at 08:06

## 2025-05-13 RX ADMIN — PROPOFOL 30 MG: 10 INJECTION, EMULSION INTRAVENOUS at 08:08

## 2025-05-13 RX ADMIN — PROPOFOL 90 MCG/KG/MIN: 10 INJECTION, EMULSION INTRAVENOUS at 08:09

## 2025-05-13 RX ADMIN — PROPOFOL 20 MG: 10 INJECTION, EMULSION INTRAVENOUS at 08:27

## 2025-05-13 RX ADMIN — GLYCOPYRROLATE 0.2 MG: 0.2 INJECTION, SOLUTION INTRAMUSCULAR; INTRAVENOUS at 08:03

## 2025-05-13 RX ADMIN — SODIUM CHLORIDE, SODIUM LACTATE, POTASSIUM CHLORIDE, AND CALCIUM CHLORIDE: .6; .31; .03; .02 INJECTION, SOLUTION INTRAVENOUS at 07:55

## 2025-05-13 RX ADMIN — PROPOFOL 50 MG: 10 INJECTION, EMULSION INTRAVENOUS at 08:06

## 2025-05-13 RX ADMIN — MIDAZOLAM 2 MG: 1 INJECTION INTRAMUSCULAR; INTRAVENOUS at 08:03

## 2025-05-13 RX ADMIN — Medication 30 MG: at 08:06

## 2025-05-13 NOTE — ANESTHESIA POSTPROCEDURE EVALUATION
Post-Op Assessment Note    CV Status:  Stable    Pain management: adequate       Mental Status:  Alert and awake   Hydration Status:  Stable   PONV Controlled:  None   Airway Patency:  Patent     Post Op Vitals Reviewed: Yes    No anethesia notable event occurred.    Staff: Anesthesiologist           Last Filed PACU Vitals:  Vitals Value Taken Time   Temp 98.4 °F (36.9 °C) 05/13/25 0851   Pulse 86 05/13/25 0852   /72 05/13/25 0851   Resp 12 05/13/25 0852   SpO2 98 % 05/13/25 0852   Vitals shown include unfiled device data.    Modified Abdulkadir:     Vitals Value Taken Time   Activity 2 05/13/25 0851   Respiration 2 05/13/25 0851   Circulation 2 05/13/25 0851   Consciousness 2 05/13/25 0851   Oxygen Saturation 2 05/13/25 0851     Modified Abdulkadir Score: 10

## 2025-05-13 NOTE — ANESTHESIA POSTPROCEDURE EVALUATION
Post-Op Assessment Note    CV Status:  Stable    Pain management: adequate       Mental Status:  Sleepy and arousable   Hydration Status:  Stable   PONV Controlled:  None   Airway Patency:  Patent     Post Op Vitals Reviewed: Yes    No anethesia notable event occurred.    Staff: CRNA, Anesthesiologist           Last Filed PACU Vitals:  Vitals Value Taken Time   Temp     Pulse 93 05/13/25 0839   /71 05/13/25 0838   Resp 24 05/13/25 0839   SpO2 98 % 05/13/25 0839   Vitals shown include unfiled device data.

## 2025-05-13 NOTE — ANESTHESIA PREPROCEDURE EVALUATION
Procedure:  COLONOSCOPY    Relevant Problems   ANESTHESIA (within normal limits)  ADRIA diagnosis stemmed from low oxygen levels during prior colonoscopy      CARDIO   (+) Aortic valve sclerosis   (+) Benign essential hypertension   (+) Mixed hyperlipidemia      ENDO   (+) Type 2 diabetes mellitus with chronic kidney disease, with long-term current use of insulin, unspecified CKD stage (HCC)      GI/HEPATIC  S/p bowel prep      HEMATOLOGY (within normal limits)      MUSCULOSKELETAL   (+) Chronic idiopathic gout of foot   (+) Primary osteoarthritis involving multiple joints   (+) Primary osteoarthritis of both knees   (+) Primary osteoarthritis of left knee   (+) Primary osteoarthritis of right knee      PULMONARY   (+) Sleep apnea in adult   (-) URI (upper respiratory infection)      Neurology/Sleep   (+) Cervical cord myelomalacia (HCC)      Orthopedic/Musculoskeletal   (+) Cervical radiculopathy      Other   (+) BMI 32.0-32.9,adult        Physical Exam    Airway    Mallampati score: III  TM Distance: >3 FB  Neck ROM: limited     Dental   Comment: Posterior implants implants    Cardiovascular  Rhythm: regular, Rate: abnormal    Pulmonary  Pulmonary exam normal     Other Findings        Anesthesia Plan  ASA Score- 3     Anesthesia Type- IV sedation with anesthesia with ASA Monitors.         Additional Monitors:     Airway Plan:     Comment: I discussed the risks and benefits of IV sedation anesthesia including the possibility of the need to convert to general anesthesia and the potential risk of awareness.  The patient was given the opportunity to ask questions, which were answered..       Plan Factors-Exercise tolerance (METS): >4 METS.    Chart reviewed.        Patient is not a current smoker.      Obstructive sleep apnea risk education given perioperatively.        Induction- intravenous.    Postoperative Plan-         Informed Consent- Anesthetic plan and risks discussed with patient.  I personally reviewed this  patient with the CRNA. Discussed and agreed on the Anesthesia Plan with the CRNA..      NPO Status:  Vitals Value Taken Time   Date of last liquid 05/13/25 05/13/25 0738   Time of last liquid 0600 05/13/25 0738   Date of last solid 05/11/25 05/13/25 0738   Time of last solid 1800 05/13/25 0738

## 2025-05-13 NOTE — H&P
H&P - Gastroenterology   Name: Jovany Little 69 y.o. male I MRN: [id number]  Unit/Bed#:  I Date of Admission: 2025   Date of Service: 2025 I Hospital Day: 0     Assessment & Plan   This is a 69 y.o. year old male here for colonoscopy, and he is stable and optimized for his procedure.    History of Present Illness    Jovany Little is a 69 y.o. year old male who presents for history of colon polyps    REVIEW OF SYSTEMS: Per the HPI, and otherwise unremarkable.    Historical Information   Past Medical History:   Diagnosis Date    Arthritis     Diabetes mellitus (HCC)     Difficulty walking     DM (diabetes mellitus) (HCC)     GERD (gastroesophageal reflux disease)     Gout     Hyperlipidemia     Hypertension     Osteoarthritis     Sleep apnea      Past Surgical History:   Procedure Laterality Date    HERNIA REPAIR      KNEE ARTHROPLASTY Left 2024    LAMINECTOMY      SPINAL FIXATION SURGERY      TONSILLECTOMY       Social History     Tobacco Use    Smoking status: Former     Current packs/day: 0.00     Average packs/day: 0.5 packs/day for 10.0 years (5.0 ttl pk-yrs)     Types: Cigarettes     Start date: 1975     Quit date: 1985     Years since quittin.3    Smokeless tobacco: Never   Vaping Use    Vaping status: Never Used   Substance and Sexual Activity    Alcohol use: Yes     Alcohol/week: 3.0 standard drinks of alcohol     Types: 3 Shots of liquor per week    Drug use: Never    Sexual activity: Yes     Partners: Female     E-Cigarette/Vaping    E-Cigarette Use Never User      E-Cigarette/Vaping Substances    Nicotine No     THC No     CBD No     Flavoring No     Other No     Unknown No      Mother with colon cancer    Meds/Allergies     Current Outpatient Medications:     allopurinol (ZYLOPRIM) 300 mg tablet    atorvastatin (LIPITOR) 80 mg tablet    celecoxib (CeleBREX) 200 mg capsule    hydroCHLOROthiazide 25 mg tablet    losartan (COZAAR) 50 mg tablet    metFORMIN (GLUCOPHAGE-XR)  500 mg 24 hr tablet    pantoprazole (PROTONIX) 40 mg tablet    VITAMIN D PO    cephalexin (KEFLEX) 500 mg capsule  No current facility-administered medications for this encounter.    Facility-Administered Medications Ordered in Other Encounters:     lactated ringers infusion, , Intravenous, Continuous PRN, New Bag at 05/13/25 0755  Allergies   Allergen Reactions    Nuts - Food Allergy Anaphylaxis       Objective :  Temp:  [97.8 °F (36.6 °C)] 97.8 °F (36.6 °C)  HR:  [110] 110  BP: (156)/(89) 156/89  Resp:  [18] 18  SpO2:  [95 %] 95 %  O2 Device: None (Room air)    Physical Exam  Gen: NAD  Head: NCAT  CV: RRR  CHEST: Clear  ABD: soft, NT/ND  EXT: no edema

## 2025-05-16 ENCOUNTER — RESULTS FOLLOW-UP (OUTPATIENT)
Dept: GASTROENTEROLOGY | Facility: MEDICAL CENTER | Age: 70
End: 2025-05-16

## 2025-05-16 PROCEDURE — 88305 TISSUE EXAM BY PATHOLOGIST: CPT | Performed by: PATHOLOGY

## 2025-05-16 NOTE — RESULT ENCOUNTER NOTE
Results relayed via Mychart  Multiple adenomatous polyps including one greater than 10 mm.  Repeat colonoscopy in 3 years

## 2025-05-21 NOTE — TELEPHONE ENCOUNTER
Called the patient and left a VM stating that we are reaching out with non-urgent procedure results which can also be viewed on their expresscoinhart. Provided office number for patient to reach us.    3 year colonoscopy recall was placed. Thank you!     ----- Message from Diana Jaiyeola, MD sent at 5/21/2025  7:07 AM EDT -----  Please call the patient with the results    I sent the result via [employer] but received a notification that it was not viewed.    The  colon polyp removed was called an adenoma. This is a pre-cancerous lesion and was completely removed. There was no evidence of cancer in the polyp.      You should have the colonoscopy repeated in 3 years due to a history of colon polyps.  ----- Message -----  From: Mychart, Generic  Sent: 5/21/2025   5:00 AM EDT  To: Diana M Jaiyeola, MD  Subject: Notification of Unviewed Test Results            IKER GALARZA has not viewed the following results:  - TISSUE EXAM

## 2025-05-21 NOTE — RESULT ENCOUNTER NOTE
Please call the patient with the results    I sent the result via As Seen on TV but received a notification that it was not viewed.    The  colon polyp removed was called an adenoma. This is a pre-cancerous lesion and was completely removed. There was no evidence of cancer in the polyp.      You should have the colonoscopy repeated in 3 years due to a history of colon polyps.

## 2025-05-23 NOTE — RESULT ENCOUNTER NOTE
Called and spoke with the patient. The patient already viewed the pathology report, and we verified understanding. The patient thanked us.     3 year colonoscopy recall was placed. Thank you.

## 2025-05-28 DIAGNOSIS — M19.90 INFLAMMATORY ARTHRITIS: Primary | ICD-10-CM

## 2025-05-28 RX ORDER — PREDNISONE 10 MG/1
TABLET ORAL
Qty: 40 TABLET | Refills: 0 | Status: SHIPPED | OUTPATIENT
Start: 2025-05-28

## 2025-06-11 ENCOUNTER — RA CDI HCC (OUTPATIENT)
Dept: OTHER | Facility: HOSPITAL | Age: 70
End: 2025-06-11

## 2025-06-11 NOTE — PROGRESS NOTES
HCC coding opportunities          Chart Reviewed number of suggestions sent to Provider: 1     Patients Insurance     Medicare Insurance: Medicare        E11.36

## 2025-06-16 ENCOUNTER — APPOINTMENT (OUTPATIENT)
Dept: LAB | Facility: MEDICAL CENTER | Age: 70
End: 2025-06-16
Attending: INTERNAL MEDICINE
Payer: MEDICARE

## 2025-06-16 DIAGNOSIS — E83.52 HYPERCALCEMIA: ICD-10-CM

## 2025-06-16 DIAGNOSIS — E11.22 TYPE 2 DIABETES MELLITUS WITH CHRONIC KIDNEY DISEASE, WITH LONG-TERM CURRENT USE OF INSULIN, UNSPECIFIED CKD STAGE (HCC): ICD-10-CM

## 2025-06-16 DIAGNOSIS — Z79.4 TYPE 2 DIABETES MELLITUS WITH CHRONIC KIDNEY DISEASE, WITH LONG-TERM CURRENT USE OF INSULIN, UNSPECIFIED CKD STAGE (HCC): ICD-10-CM

## 2025-06-16 LAB
ALBUMIN SERPL BCG-MCNC: 4.3 G/DL (ref 3.5–5)
ALP SERPL-CCNC: 66 U/L (ref 34–104)
ALT SERPL W P-5'-P-CCNC: 34 U/L (ref 7–52)
ANION GAP SERPL CALCULATED.3IONS-SCNC: 10 MMOL/L (ref 4–13)
AST SERPL W P-5'-P-CCNC: 31 U/L (ref 13–39)
BILIRUB SERPL-MCNC: 0.71 MG/DL (ref 0.2–1)
BUN SERPL-MCNC: 22 MG/DL (ref 5–25)
CA-I BLD-SCNC: 1.26 MMOL/L (ref 1.12–1.32)
CALCIUM SERPL-MCNC: 10.1 MG/DL (ref 8.4–10.2)
CHLORIDE SERPL-SCNC: 102 MMOL/L (ref 96–108)
CO2 SERPL-SCNC: 29 MMOL/L (ref 21–32)
CREAT SERPL-MCNC: 1.15 MG/DL (ref 0.6–1.3)
EST. AVERAGE GLUCOSE BLD GHB EST-MCNC: 140 MG/DL
GFR SERPL CREATININE-BSD FRML MDRD: 64 ML/MIN/1.73SQ M
GLUCOSE P FAST SERPL-MCNC: 108 MG/DL (ref 65–99)
HBA1C MFR BLD: 6.5 %
POTASSIUM SERPL-SCNC: 4.5 MMOL/L (ref 3.5–5.3)
PROT SERPL-MCNC: 6.8 G/DL (ref 6.4–8.4)
PTH-INTACT SERPL-MCNC: 56.1 PG/ML (ref 12–88)
SODIUM SERPL-SCNC: 141 MMOL/L (ref 135–147)

## 2025-06-16 PROCEDURE — 82330 ASSAY OF CALCIUM: CPT

## 2025-06-16 PROCEDURE — 80053 COMPREHEN METABOLIC PANEL: CPT

## 2025-06-16 PROCEDURE — 83970 ASSAY OF PARATHORMONE: CPT

## 2025-06-16 PROCEDURE — 83036 HEMOGLOBIN GLYCOSYLATED A1C: CPT

## 2025-06-16 PROCEDURE — 36415 COLL VENOUS BLD VENIPUNCTURE: CPT

## 2025-06-17 ENCOUNTER — RESULTS FOLLOW-UP (OUTPATIENT)
Dept: FAMILY MEDICINE CLINIC | Facility: CLINIC | Age: 70
End: 2025-06-17

## 2025-06-18 ENCOUNTER — OFFICE VISIT (OUTPATIENT)
Dept: FAMILY MEDICINE CLINIC | Facility: CLINIC | Age: 70
End: 2025-06-18
Payer: MEDICARE

## 2025-06-18 VITALS
HEIGHT: 69 IN | BODY MASS INDEX: 34.66 KG/M2 | DIASTOLIC BLOOD PRESSURE: 78 MMHG | TEMPERATURE: 97.6 F | OXYGEN SATURATION: 97 % | SYSTOLIC BLOOD PRESSURE: 126 MMHG | WEIGHT: 234 LBS | HEART RATE: 67 BPM | RESPIRATION RATE: 18 BRPM

## 2025-06-18 DIAGNOSIS — M54.12 CERVICAL RADICULOPATHY: ICD-10-CM

## 2025-06-18 DIAGNOSIS — E11.22 TYPE 2 DIABETES MELLITUS WITH CHRONIC KIDNEY DISEASE, WITH LONG-TERM CURRENT USE OF INSULIN, UNSPECIFIED CKD STAGE (HCC): ICD-10-CM

## 2025-06-18 DIAGNOSIS — Z79.4 TYPE 2 DIABETES MELLITUS WITH CHRONIC KIDNEY DISEASE, WITH LONG-TERM CURRENT USE OF INSULIN, UNSPECIFIED CKD STAGE (HCC): ICD-10-CM

## 2025-06-18 DIAGNOSIS — E11.9 TYPE 2 DIABETES MELLITUS WITHOUT COMPLICATION, WITHOUT LONG-TERM CURRENT USE OF INSULIN (HCC): Primary | ICD-10-CM

## 2025-06-18 DIAGNOSIS — I10 BENIGN ESSENTIAL HYPERTENSION: ICD-10-CM

## 2025-06-18 PROCEDURE — G2211 COMPLEX E/M VISIT ADD ON: HCPCS | Performed by: INTERNAL MEDICINE

## 2025-06-18 PROCEDURE — 99214 OFFICE O/P EST MOD 30 MIN: CPT | Performed by: INTERNAL MEDICINE

## 2025-06-18 NOTE — ASSESSMENT & PLAN NOTE
Lab Results   Component Value Date    HGBA1C 6.5 (H) 06/16/2025   Lo carb diet Fasting blood sugar is lower so increased activity and diet encouraged

## 2025-06-18 NOTE — PROGRESS NOTES
Name: Jovany Little      : 1955      MRN: [id number]  Encounter Provider: Jacqueline Ventura DO  Encounter Date: 2025   Encounter department: [facility]  :  Assessment & Plan  Type 2 diabetes mellitus without complication, without long-term current use of insulin (HCC)    Lab Results   Component Value Date    HGBA1C 6.5 (H) 2025       Orders:  •  Albumin / creatinine urine ratio; Future  •  Comprehensive metabolic panel; Future  •  Hemoglobin A1C; Future  •  Hemoglobin A1C; Future  Recheck labs 6 months  Stay active Lo carb diet   Benign essential hypertension  BP stable Exercise encouraged Continue current rx        Type 2 diabetes mellitus with chronic kidney disease, with long-term current use of insulin, unspecified CKD stage (HCC)    Lab Results   Component Value Date    HGBA1C 6.5 (H) 2025   Lo carb diet Fasting blood sugar is lower so increased activity and diet encouraged          Cervical radiculopathy  Encouraged home exercises daily in AM to limit recurrent symptoms              Depression Screening and Follow-up Plan: Patient was screened for depression during today's encounter. They screened negative with a PHQ-2 score of 0.    Rto 6months   History of Present Illness   HPI  Pt doing ok He did start with some mild cervical radicular symptoms last week and used celebrex He was at [employer] and more active also doing yardwork recently Sxs mild He has exercises but has not been doing regulalry No chest pain or sob He has a blister on right great toe from vacation - it is improving He has been trying to stay hydrated and follow lo carb diet   Review of Systems   Constitutional:  Negative for chills and fever.   HENT: Negative.     Eyes:  Negative for visual disturbance.   Respiratory:  Negative for cough and shortness of breath.    Cardiovascular: Negative.    Gastrointestinal: Negative.    Genitourinary: Negative.    Musculoskeletal:  Positive for arthralgias.   Skin:   Negative for rash.   Neurological:  Negative for dizziness, light-headedness and headaches.   Psychiatric/Behavioral:  Negative for sleep disturbance. The patient is not nervous/anxious.    Diabetic Foot Exam    Patient's shoes and socks removed.    Right Foot/Ankle   Right Foot Inspection  Skin Exam: skin normal and skin intact. No dry skin, no warmth, no callus, no erythema, no maceration, no abnormal color, no pre-ulcer, no ulcer and no callus.     Toe Exam: ROM and strength within normal limits.     Sensory   Vibration: intact  Monofilament testing: intact    Vascular  The right DP pulse is 2+. The right PT pulse is 2+.     Left Foot/Ankle  Left Foot Inspection  Skin Exam: skin normal and skin intact. No dry skin, no warmth, no erythema, no maceration, normal color, no pre-ulcer, no ulcer and no callus.     Toe Exam: ROM and strength within normal limits.     Sensory   Vibration: intact  Monofilament testing: intact    Vascular  The left DP pulse is 2+. The left PT pulse is 2+.     Assign Risk Category  No deformity present  No loss of protective sensation  No weak pulses  Risk: 0    Past Medical History[1]  Past Surgical History[2]  Social History     Socioeconomic History   • Marital status: /Civil Union     Spouse name: Not on file   • Number of children: Not on file   • Years of education: Not on file   • Highest education level: Not on file   Occupational History   • Not on file   Tobacco Use   • Smoking status: Former     Current packs/day: 0.00     Average packs/day: 0.5 packs/day for 10.0 years (5.0 ttl pk-yrs)     Types: Cigarettes     Start date: 1975     Quit date: 1985     Years since quittin.4   • Smokeless tobacco: Never   Vaping Use   • Vaping status: Never Used   Substance and Sexual Activity   • Alcohol use: Yes     Alcohol/week: 3.0 standard drinks of alcohol     Types: 3 Shots of liquor per week   • Drug use: Never   • Sexual activity: Yes     Partners: Female   Other  "Topics Concern   • Not on file   Social History Narrative   • Not on file     Social Drivers of Health     Financial Resource Strain: Low Risk  (3/1/2024)    Received from [facility]    Overall Financial Resource Strain (CARDIA)    • Difficulty of Paying Living Expenses: Not hard at all   Food Insecurity: No Food Insecurity (9/7/2024)    Nursing - Inadequate Food Risk Classification    • Worried About Running Out of Food in the Last Year: Never true    • Ran Out of Food in the Last Year: Never true    • Ran Out of Food in the Last Year: Not on file   Transportation Needs: No Transportation Needs (9/7/2024)    PRAPARE - Transportation    • Lack of Transportation (Medical): No    • Lack of Transportation (Non-Medical): No   Physical Activity: Not on file   Stress: Not on file   Social Connections: Not on file   Intimate Partner Violence: Not At Risk (3/1/2024)    Received from [facility]    Humiliation, Afraid, Rape, and Kick questionnaire    • Within the last year, have you been afraid of your partner or ex-partner?: No    • Within the last year, have you been humiliated or emotionally abused in other ways by your partner or ex-partner?: No    • Within the last year, have you been kicked, hit, slapped, or otherwise physically hurt by your partner or ex-partner?: No    • Within the last year, have you been raped or forced to have any kind of sexual activity by your partner or ex-partner?: No   Housing Stability: Low Risk  (9/7/2024)    Housing Stability Vital Sign    • Unable to Pay for Housing in the Last Year: No    • Number of Times Moved in the Last Year: 0    • Homeless in the Last Year: No     Allergies   Allergen Reactions   • Nuts - Food Allergy Anaphylaxis       Objective   /78   Pulse 67   Temp 97.6 °F (36.4 °C) (Temporal)   Resp 18   Ht 5' 9\" (1.753 m)   Wt 106 kg (234 lb)   SpO2 97%   BMI 34.56 kg/m²      Physical Exam  Vitals and nursing note reviewed. "   Constitutional:       General: He is not in acute distress.     Appearance: Normal appearance. He is not ill-appearing, toxic-appearing or diaphoretic.   HENT:      Head: Normocephalic and atraumatic.      Right Ear: External ear normal.      Left Ear: External ear normal.      Nose: Nose normal.      Mouth/Throat:      Mouth: Mucous membranes are moist.     Eyes:      General: No scleral icterus.     Extraocular Movements: Extraocular movements intact.      Conjunctiva/sclera: Conjunctivae normal.      Pupils: Pupils are equal, round, and reactive to light.       Cardiovascular:      Rate and Rhythm: Normal rate and regular rhythm.      Pulses: Normal pulses. no weak pulses.           Dorsalis pedis pulses are 2+ on the right side and 2+ on the left side.        Posterior tibial pulses are 2+ on the right side and 2+ on the left side.   Pulmonary:      Effort: Pulmonary effort is normal. No respiratory distress.      Breath sounds: Normal breath sounds. No wheezing.   Abdominal:      General: There is no distension.      Palpations: Abdomen is soft.      Tenderness: There is no abdominal tenderness.     Musculoskeletal:      Cervical back: Normal range of motion and neck supple.   Feet:      Right foot:      Skin integrity: No ulcer, skin breakdown, erythema, warmth, callus or dry skin.      Left foot:      Skin integrity: No ulcer, skin breakdown, erythema, warmth, callus or dry skin.   Lymphadenopathy:      Cervical: No cervical adenopathy.     Skin:     General: Skin is warm and dry.      Coloration: Skin is not jaundiced.     Neurological:      General: No focal deficit present.      Mental Status: He is alert and oriented to person, place, and time. Mental status is at baseline.      Cranial Nerves: No cranial nerve deficit.      Sensory: No sensory deficit.     Psychiatric:         Mood and Affect: Mood normal.         Behavior: Behavior normal.         Thought Content: Thought content normal.          Judgment: Judgment normal.                [1]  Past Medical History:  Diagnosis Date   • Arthritis    • Diabetes mellitus (HCC)    • Difficulty walking    • DM (diabetes mellitus) (HCC)    • GERD (gastroesophageal reflux disease)    • Gout    • Hyperlipidemia    • Hypertension    • Osteoarthritis    • Sleep apnea    [2]  Past Surgical History:  Procedure Laterality Date   • HERNIA REPAIR     • KNEE ARTHROPLASTY Left 03/01/2024   • LAMINECTOMY     • SPINAL FIXATION SURGERY     • TONSILLECTOMY

## 2025-08-14 ENCOUNTER — PROCEDURE VISIT (OUTPATIENT)
Dept: PODIATRY | Facility: CLINIC | Age: 70
End: 2025-08-14
Payer: MEDICARE